# Patient Record
Sex: FEMALE | Race: WHITE | NOT HISPANIC OR LATINO | Employment: FULL TIME | URBAN - METROPOLITAN AREA
[De-identification: names, ages, dates, MRNs, and addresses within clinical notes are randomized per-mention and may not be internally consistent; named-entity substitution may affect disease eponyms.]

---

## 2019-10-23 ENCOUNTER — OFFICE VISIT (OUTPATIENT)
Dept: GYNECOLOGIC ONCOLOGY | Facility: CLINIC | Age: 69
End: 2019-10-23

## 2019-10-23 VITALS
TEMPERATURE: 97.6 F | HEART RATE: 86 BPM | HEIGHT: 64 IN | BODY MASS INDEX: 28.68 KG/M2 | SYSTOLIC BLOOD PRESSURE: 182 MMHG | RESPIRATION RATE: 10 BRPM | OXYGEN SATURATION: 96 % | WEIGHT: 168 LBS | DIASTOLIC BLOOD PRESSURE: 77 MMHG

## 2019-10-23 DIAGNOSIS — R87.619 ATYPICAL GLANDULAR CELLS OF UNDETERMINED SIGNIFICANCE (AGUS) ON CERVICAL PAP SMEAR: ICD-10-CM

## 2019-10-23 DIAGNOSIS — R93.89 THICKENED ENDOMETRIUM: ICD-10-CM

## 2019-10-23 DIAGNOSIS — R01.1 HEART MURMUR: ICD-10-CM

## 2019-10-23 DIAGNOSIS — N95.0 PMB (POSTMENOPAUSAL BLEEDING): Primary | ICD-10-CM

## 2019-10-23 DIAGNOSIS — J42 CHRONIC BRONCHITIS, UNSPECIFIED CHRONIC BRONCHITIS TYPE (HCC): ICD-10-CM

## 2019-10-23 PROBLEM — J44.9 COPD (CHRONIC OBSTRUCTIVE PULMONARY DISEASE) (HCC): Status: ACTIVE | Noted: 2019-10-23

## 2019-10-23 PROCEDURE — 99205 OFFICE O/P NEW HI 60 MIN: CPT | Performed by: OBSTETRICS & GYNECOLOGY

## 2019-10-23 PROCEDURE — 58100 BIOPSY OF UTERUS LINING: CPT | Performed by: OBSTETRICS & GYNECOLOGY

## 2019-10-23 PROCEDURE — 88305 TISSUE EXAM BY PATHOLOGIST: CPT | Performed by: OBSTETRICS & GYNECOLOGY

## 2019-10-23 RX ORDER — GABAPENTIN 300 MG/1
300 CAPSULE ORAL 3 TIMES DAILY
Refills: 0 | COMMUNITY
Start: 2019-09-04 | End: 2021-01-01 | Stop reason: HOSPADM

## 2019-10-23 RX ORDER — IBUPROFEN 200 MG
200 TABLET ORAL 2 TIMES DAILY PRN
COMMUNITY
End: 2021-01-01

## 2019-10-23 RX ORDER — ALBUTEROL SULFATE 90 UG/1
2 AEROSOL, METERED RESPIRATORY (INHALATION) EVERY 4 HOURS PRN
COMMUNITY

## 2019-10-23 RX ORDER — ATORVASTATIN CALCIUM 40 MG/1
40 TABLET, FILM COATED ORAL
Refills: 1 | COMMUNITY
Start: 2019-09-30 | End: 2020-01-01 | Stop reason: ALTCHOICE

## 2019-10-23 RX ORDER — TRIAMTERENE AND HYDROCHLOROTHIAZIDE 37.5; 25 MG/1; MG/1
1 CAPSULE ORAL EVERY MORNING
Refills: 1 | COMMUNITY
Start: 2019-07-27 | End: 2021-01-01 | Stop reason: HOSPADM

## 2019-10-23 RX ORDER — ASPIRIN 81 MG/1
81 TABLET ORAL DAILY
COMMUNITY

## 2019-10-23 RX ORDER — DULOXETIN HYDROCHLORIDE 60 MG/1
CAPSULE, DELAYED RELEASE ORAL
Refills: 1 | COMMUNITY
Start: 2019-10-14

## 2019-10-23 NOTE — PROGRESS NOTES
"Ele Strickland  [id number]  1950      Reason for visit: Menopausal bleeding, Pap smear with atypical glandular cells    Consultation:  Patient is being seen at the request of Kaylen Taylor DO     History of present illness:  The patient is a 69 y.o. year old female who presents today for treatment and evaluation of the above issues.    She reports that she has been having postmenopausal bleeding for about the last 10   Months.  The bleeding is primarily spotting, and mostly at night.  She denies passage of any clots.  She went through menopause in her early 50s, and had not had any vaginal bleeding until 10 months ago.  Having not had a Pap smear in \"forever,\" she presented to her primary gynecologist, who performed a Pap smear, which returned with atypical glandular cells, HPV negative.  An endometrial biopsy was not performed, and she was referred to GYN oncology for further management.    Her medical comorbidities include COPD, diabetes mellitus, anxiety and depression, hypertension, and achalasia.  He sees only her primary provider for these problems.  She has a prominent heart murmur, which she reports has been evaluated in the past and was stated to be \"benign.\"  She has a history of back pain, and has been undergoing steroid injections for the same.  She reports that back surgery has been recommended to her, but she is hesitant to do so, as it would require 6 to 12 months of recovery time.    For new patients, [facility] intake form from 10/23/19 was reviewed and confirmed today.    OBGYN History:  She is a .  She does not currently use HRT, but was briefly on progesterone around the time that she went through menopause. She does not have a history of abnormal pap smears.      Oncologic History:   No history exists.         Past Medical History:   Diagnosis Date   • Chronic back pain    • Chronic bronchitis (CMS/HCC)    • COPD (chronic obstructive pulmonary disease) (CMS/HCC)    • Depression    • Diabetes " (CMS/HCC)    • Gall stones    • Heart murmur    • High blood pressure    • Pinched nerve     back       Past Surgical History:   Procedure Laterality Date   • ESOPHAGUS SURGERY         MEDICATIONS: The current medication list was reviewed with the patient and updated in the EMR this date per the Medical Assistant. Medication dosages and frequencies were confirmed to be accurate.      Allergies:  is allergic to augmentin [amoxicillin-pot clavulanate] and biaxin [clarithromycin].    Social History:   Social History     Socioeconomic History   • Marital status:      Spouse name: Not on file   • Number of children: 2   • Years of education: Not on file   • Highest education level: Not on file   Tobacco Use   • Smoking status: Current Every Day Smoker     Packs/day: 1.00     Years: 30.00     Pack years: 30.00     Types: Cigarettes   Substance and Sexual Activity   • Alcohol use: No     Frequency: Never   • Drug use: No   • Sexual activity: No       Family History:    Family History   Problem Relation Age of Onset   • Heart attack Mother    • Uterine cancer Mother 84   • Lung cancer Brother    • Colon cancer Half-Sister 50   • Colon cancer Paternal Aunt    • Colon cancer Cousin        Health Maintenance:    Health Maintenance   Topic Date Due   • MAMMOGRAM  1950   • ANNUAL PHYSICAL  04/02/1953   • TDAP/TD VACCINES (1 - Tdap) 04/02/1969   • ZOSTER VACCINE (1 of 2) 04/02/2000   • LUNG CANCER SCREENING  04/02/2005   • PNEUMOCOCCAL VACCINES (65+ LOW/MEDIUM RISK) (1 of 2 - PCV13) 04/02/2015   • INFLUENZA VACCINE  08/01/2019   • HEPATITIS C SCREENING  10/23/2019   • COLONOSCOPY  10/23/2019       Review of Systems   Constitutional: Positive for appetite change (poor appetite), fatigue and unexpected weight change (13lb weight loss over 6 months). Negative for chills and fever.   HENT: Positive for mouth sores (thrush), rhinorrhea, sinus pain and sore throat (dry mouth, altered taste). Negative for ear discharge,  "ear pain, hearing loss, nosebleeds, postnasal drip, sinus pressure, tinnitus and trouble swallowing.    Eyes: Positive for itching and visual disturbance (blurred vision). Negative for pain.   Respiratory: Positive for cough and shortness of breath. Negative for wheezing.    Cardiovascular: Negative for chest pain and palpitations.        Heart murmur   Gastrointestinal: Positive for nausea. Negative for abdominal pain, blood in stool, constipation, diarrhea and vomiting.        Heartburn   Endocrine: Negative for heat intolerance (no hot flashes).   Genitourinary: Positive for frequency and urgency. Negative for difficulty urinating, flank pain and hematuria.        No hesitancy, vaginal discharge, pain with intercourse. Reports incontinence of stool and urine, nocturia, and vaginal bleeding.   Musculoskeletal: Positive for arthralgias (back, left hip and leg pain), back pain (pinched nerve), gait problem (limping), joint swelling and myalgias.   Skin: Negative for color change, rash and wound.        Reports skin itching   Allergic/Immunologic: Negative for immunocompromised state.   Neurological: Positive for numbness. Negative for dizziness, seizures, syncope, weakness and headaches.   Hematological: Negative for adenopathy. Bruises/bleeds easily.   Psychiatric/Behavioral: Positive for dysphoric mood. Negative for agitation, confusion and sleep disturbance. The patient is not nervous/anxious.    All other systems reviewed and are negative.      Physical Exam    Vitals:    10/23/19 0929   BP: (!) 182/77   Pulse: 86   Resp: 10   Temp: 97.6 °F (36.4 °C)   TempSrc: Temporal   SpO2: 96%   Weight: 76.2 kg (168 lb)   Height: 162.6 cm (64\")   PainSc:   6   PainLoc: Back     Body mass index is 28.84 kg/m².    Wt Readings from Last 3 Encounters:   10/23/19 76.2 kg (168 lb)         GENERAL: Alert, tired-appearing female appearing her stated age who is in no apparent distress.   HEENT: Sclera anicteric. Head normocephalic, " atraumatic. Mucus membranes moist.   NECK: Trachea midline, supple, without masses.  No thyromegaly.   BREASTS: Deferred  CARDIOVASCULAR: Normal rate, regular rhythm, no murmurs, rubs, or gallops.  No peripheral edema.  RESPIRATORY: Clear to auscultation bilaterally, normal respiratory effort  BACK:  No CVA tenderness, no vertebral tenderness on palpation  GASTROINTESTINAL:  Abdomen is soft, non-tender, non-distended, no rebound or guarding, no masses, or hernias. No HSM.  SKIN:  Warm, dry, well-perfused.  All visible areas intact.  No rashes, lesions, ulcers.  PSYCHIATRIC: AO x3, with appropriate affect, normal thought processes.  NEUROLOGIC: No focal deficits.  Moves extremities well.  MUSCULOSKELETAL: Normal gait and station.   EXTREMITIES:   No cyanosis, clubbing, symmetric.  LYMPHATICS:  No cervical or inguinal adenopathy noted.     PELVIC exam:  External genitalia are free from lesion. On speculum examination, the cervix was free from lesion; a small amount of bleeding was noted from cervical os. On bimanual examination no mass was appreciated.  Uterus was normal in size and shape. There is no cervical motion or uterine tenderness. No cervical mass was palpated. Parametria were smooth. Rectovaginal exam was deferred.     ECOG PS 1    PROCEDURES:  Endometrial biopsy was performed after obtaining informed consent.  Anterior lip of the cervix was grasped with a single-tooth tenaculum.  Pipelle was inserted following dilation with an os finder and a single pass was obtained with moderate tissue.  Upper cervical stenosis was noted at ~5 cm depth.  Patient had cramping but overall tolerated procedure well.  Silver nitrate was applied to the tenaculum site for hemostasis.  Procedure was overall well tolerated.  There was minimal blood loss and no complications at the time of procedure.      Diagnostic Data:     Transvaginal ultrasound ([facility]), performed on 10/11/2010:  Findings:  Uterus: Size is 8.3  x 5.1 x 7.6 cm.  Endometrial thickness is 18 mm.  Anteverted, anteflexed, normal in size.  Multiple uterine fibroids are seen.  The dominant fibroid measures 5.9 x 4.6 x 3.9 cm.  Adnexa: Neither right nor left ovary was visualized.  No adnexal masses seen.  Cul-de-sac: No fluid or mass.  Impression: Multiple uterine fibroids.    No results found for: WBC, HGB, HCT, MCV, PLT, NEUTROABS, GLUCOSE, BUN, CREATININE, EGFRIFNONA, EGFRIFAFRI, NA, K, CL, CO2, MG, PHOS, CALCIUM, ALBUMIN, AST, ALT, BILITOT  No results found for:         Assessment/Plan   This is a 69 y.o. woman with multiple comorbidities who presents today with postmenopausal bleeding, and a Pap smear showing atypical glandular cells.    Post menopausal bleeding, atypical glandular cells  -Endometrial biopsy was performed today, however the exam was somewhat inhibited, possibly by the presence of fibroids.  We will follow-up on the results, but it is likely that the patient will require surgical management.  We discussed the risk of malignancy in this setting, and the possible need for staging should the biopsy returned with high risk cytology.  Patient expressed understanding.  -If biopsy returns with high risk cells, she will require a CT scan prior to surgery.    Tobacco abuse  -She currently smokes about 1 pack/day.  She was encouraged to cut down or quit at least during the alethea-surgical period.    COPD, heart murmur  -She will require assessment by a pulmonologist and cardiologist prior to surgery.  Referrals were made.    Diabetes mellitus, back pain, hypertension  -Continue management per primary provider.    Cardiac murmur, no recent evaluation  -referral to cardiology     Patient was consented for robot-assisted total laparoscopic hysterectomy, bilateral salpingo-oophorectomy, possible lymph node dissection.      Risks and benefits of surgery were discussed.  This included, but was not limited to, infection and bleeding like when the skin is  cut; damage to surrounding structures; and incisional complications.  Risk of DVT was addressed for major surgeries.  Standard of care efforts to minimize these risks were reviewed.  Typical hospital stay and recovery were discussed as well as post-procedure precautions.  Surgical implications of chronic illnesses on recovery and surgical outcome were reviewed.     Pain medication regimen for postoperative care was discussed.  Typical regimen and avoidance of narcotics was discussed.  Patient was educated that other factors, such as existing narcotic use, can impact postoperative pain management.      Risks and benefits of lymph node dissection were further discussed.  This included lymphocyst, hematoma, lymphedema, vascular injury, and nerve injury.    Patient verbalized understanding of the plan including the risks and benefits.  Appropriate perioperative testing including laboratory evaluation, EKG as clinically indicated, chest x-ray as clinically indicated, and preadmission evaluation were all ordered as a part of this patient's care.    Pain assessment was performed today as a part of patient’s care.  For patients with pain related to surgery, gynecologic malignancy or cancer treatment, the plan is as noted in the assessment/plan.  For patients with pain not related to these issues, they are to seek any further needed care from a more appropriate provider, such as PCP.    Orders Placed This Encounter   Procedures   • Ambulatory Referral to Cardiology     Referral Priority:   Routine     Referral Type:   Consultation     Referral Reason:   Specialty Services Required     Requested Specialty:   Cardiology     Number of Visits Requested:   1   • Ambulatory Referral to Pulmonology     Referral Priority:   Routine     Referral Type:   Consultation     Referral Reason:   Specialty Services Required     Requested Specialty:   Pulmonary Disease     Number of Visits Requested:   1       FOLLOW UP: surgery    I  personally spent 60 minutes face-to-face with the patient of which >50% was spent performing counseling/coordiantion of care.      Patient was seen and examined with Dr. Johnson,  resident, who performed portions of the examination and documentation for this patient's care under my direct supervision.  I agree with the above documentation and plan.    Zuly Evans MD  10/23/19  7:50 PM

## 2019-10-25 ENCOUNTER — OFFICE VISIT (OUTPATIENT)
Dept: PULMONOLOGY | Facility: CLINIC | Age: 69
End: 2019-10-25

## 2019-10-25 VITALS
BODY MASS INDEX: 28 KG/M2 | OXYGEN SATURATION: 96 % | HEIGHT: 64 IN | WEIGHT: 164 LBS | HEART RATE: 88 BPM | SYSTOLIC BLOOD PRESSURE: 142 MMHG | DIASTOLIC BLOOD PRESSURE: 86 MMHG | TEMPERATURE: 98.6 F

## 2019-10-25 DIAGNOSIS — R01.1 HEART MURMUR: ICD-10-CM

## 2019-10-25 DIAGNOSIS — I51.7 CARDIOMEGALY: ICD-10-CM

## 2019-10-25 DIAGNOSIS — R05.3 CHRONIC COUGH: Primary | ICD-10-CM

## 2019-10-25 DIAGNOSIS — K21.9 CHRONIC GERD: ICD-10-CM

## 2019-10-25 DIAGNOSIS — R60.0 LOWER EXTREMITY EDEMA: ICD-10-CM

## 2019-10-25 DIAGNOSIS — R06.09 DYSPNEA ON EXERTION: ICD-10-CM

## 2019-10-25 DIAGNOSIS — Z72.0 TOBACCO ABUSE: ICD-10-CM

## 2019-10-25 PROCEDURE — 99204 OFFICE O/P NEW MOD 45 MIN: CPT | Performed by: INTERNAL MEDICINE

## 2019-10-25 PROCEDURE — 94726 PLETHYSMOGRAPHY LUNG VOLUMES: CPT | Performed by: INTERNAL MEDICINE

## 2019-10-25 PROCEDURE — 94729 DIFFUSING CAPACITY: CPT | Performed by: INTERNAL MEDICINE

## 2019-10-25 PROCEDURE — 94375 RESPIRATORY FLOW VOLUME LOOP: CPT | Performed by: INTERNAL MEDICINE

## 2019-10-28 ENCOUNTER — TELEPHONE (OUTPATIENT)
Dept: GYNECOLOGIC ONCOLOGY | Facility: CLINIC | Age: 69
End: 2019-10-28

## 2019-10-28 DIAGNOSIS — R06.09 EXERTIONAL DYSPNEA: Primary | ICD-10-CM

## 2019-10-28 DIAGNOSIS — R06.09 DYSPNEA ON EXERTION: Primary | ICD-10-CM

## 2019-10-28 PROBLEM — Z72.0 TOBACCO ABUSE: Status: ACTIVE | Noted: 2019-10-28

## 2019-10-28 PROBLEM — R60.0 LOWER EXTREMITY EDEMA: Status: ACTIVE | Noted: 2019-10-28

## 2019-10-28 PROBLEM — I51.7 CARDIOMEGALY: Status: ACTIVE | Noted: 2019-10-28

## 2019-10-28 PROBLEM — K21.9 CHRONIC GERD: Status: ACTIVE | Noted: 2019-10-28

## 2019-10-28 NOTE — PROGRESS NOTES
PULMONARY  NOTE    Chief Complaint     Tobacco abuse, chronic cough, dyspnea, reflux    History of Present Illness     69-year-old white female referred for preoperative evaluation.  She is followed by Dr. Crowell.    She has a long history of tobacco abuse that has consisted of 1 or more packs of cigarettes per day.  She continues to smoke.  At this point she does not have any plans for smoking cessation.    She has dyspnea on exertion, not at rest.  She would be able to go up 1 flight of stairs but would get short of breath going up.    She has a chronic cough.  Occurs on a daily basis.  She typically produces a small amount of thick white sputum.  She has not had hemoptysis.    She has regular reflux symptoms.  She is not following reflux precautions.  She occasionally takes Nexium.  She previously had surgery for achalasia.    She has recently been noted to have a murmur and also has chronic lower extremity edema.  She has not had a cardiac work-up yet but is scheduled to be seen by a cardiologist    Patient Active Problem List   Diagnosis   • PMB (postmenopausal bleeding)   • Atypical glandular cells of undetermined significance (RUDDY) on cervical Pap smear   • Thickened endometrium   • Heart murmur   • Dyspnea on exertion   • Cardiomegaly   • Lower extremity edema   • Tobacco abuse   • Chronic GERD     Allergies   Allergen Reactions   • Augmentin [Amoxicillin-Pot Clavulanate] GI Intolerance   • Biaxin [Clarithromycin] Hallucinations       Current Outpatient Medications:   •  albuterol sulfate  (90 Base) MCG/ACT inhaler, Inhale 2 puffs Every 4 (Four) Hours As Needed for Wheezing., Disp: , Rfl:   •  aspirin 81 MG EC tablet, Take 81 mg by mouth Daily., Disp: , Rfl:   •  Aspirin-Acetaminophen-Caffeine (EXCEDRIN PO), Take  by mouth., Disp: , Rfl:   •  atorvastatin (LIPITOR) 40 MG tablet, Take 40 mg by mouth every night at bedtime., Disp: , Rfl: 1  •  beclomethasone (QVAR) 80 MCG/ACT inhaler, Inhale 1 puff  "2 (Two) Times a Day., Disp: , Rfl:   •  DULoxetine (CYMBALTA) 60 MG capsule, TAKE 1 CAPSULE BY MOUTH ONCE DAILY FOR 90 DAYS, Disp: , Rfl: 1  •  gabapentin (NEURONTIN) 300 MG capsule, Take 300 mg by mouth 3 (Three) Times a Day., Disp: , Rfl: 0  •  ibuprofen (ADVIL,MOTRIN) 200 MG tablet, Take 200 mg by mouth 2 (Two) Times a Day As Needed for Mild Pain ., Disp: , Rfl:   •  metFORMIN (GLUCOPHAGE) 1000 MG tablet, Take 1,000 mg by mouth 2 (Two) Times a Day., Disp: , Rfl: 1  •  triamterene-hydrochlorothiazide (DYAZIDE) 37.5-25 MG per capsule, Take 1 capsule by mouth Every Morning., Disp: , Rfl: 1  MEDICATION LIST AND ALLERGIES REVIEWED.    Family History   Problem Relation Age of Onset   • Heart attack Mother    • Uterine cancer Mother 84   • Lung cancer Brother    • Colon cancer Half-Sister 50   • Colon cancer Paternal Aunt    • Colon cancer Cousin      Social History     Tobacco Use   • Smoking status: Current Every Day Smoker     Packs/day: 1.00     Years: 30.00     Pack years: 30.00     Types: Cigarettes   Substance Use Topics   • Alcohol use: No     Frequency: Never   • Drug use: No     Social History     Social History Narrative        Has two children    Works as a  at [employer]    Continues to smoke at least 1 pack a day as she has her adult life    Denies regular alcohol use     FAMILY AND SOCIAL HISTORY REVIEWED.    Review of Systems  ALSO REFER TO SCANNED ROS SHEET FROM SAME DATE.    /86   Pulse 88   Temp 98.6 °F (37 °C)   Ht 162.6 cm (64\")   Wt 74.4 kg (164 lb)   SpO2 96% Comment: room air at rest  BMI 28.15 kg/m²   Physical Exam   Constitutional: She is oriented to person, place, and time. She appears well-developed. No distress.   HENT:   Head: Normocephalic and atraumatic.   Neck: No thyromegaly present.   Cardiovascular: Normal rate and regular rhythm.   Grade 2/6 systolic murmur   Pulmonary/Chest: Effort normal and breath sounds normal. No stridor.   Abdominal: Soft. " Bowel sounds are normal.   Musculoskeletal: Normal range of motion. She exhibits edema.   Pitting edema bilaterally   Lymphadenopathy:     She has no cervical adenopathy.        Right: No supraclavicular and no epitrochlear adenopathy present.        Left: No supraclavicular and no epitrochlear adenopathy present.   Neurological: She is alert and oriented to person, place, and time.   Skin: Skin is warm and dry. She is not diaphoretic.   Psychiatric: She has a normal mood and affect. Her behavior is normal.   Nursing note and vitals reviewed.      Results     Chest X-Ray reveal cardiomegaly but no effusions or consolidation    PFT's reveal no airway obstruction, no restriction, normal correct diffusion.    Problem List       ICD-10-CM ICD-9-CM   1. Chronic cough R05 786.2   2. Dyspnea on exertion R06.09 786.09   3. Cardiomegaly I51.7 429.3   4. Heart murmur R01.1 785.2   5. Lower extremity edema R60.0 782.3   6. Chronic GERD K21.9 530.81   7. Tobacco abuse Z72.0 305.1       Discussion     She has symptoms of chronic bronchitis but preserved lung function.  She might have underlying bronchiectasis; will get a HRCT.  I think she might do better with a LAMA/LABA - I will change her over from the QVAR to Stiolto.  I gave her samples, written instructions, and a prescription.  We discussed potential side effects    Given her smoking history, she would benefit from a screening CT scan of the chest.  We will get both a routine and high-resolution CT scan of the chest.    Reflux may be contributing to her symptoms.  We discussed reflux precautions and I gave her reflux information sheet.    We discussed the need for total smoking abstinence.  Three to 10 minutes was spent discussing the need for total smoking abstinence and smoking cessation strategies.    She needs a iliac evaluation.  She has a prominent murmur, dyspnea on exertion, cardiomegaly on chest x-ray, and lower extremity edema.  She is scheduled to see  cardiology, already.    I will plan to see her back after the above.    Strictly from a pulmonary standpoint there is no contraindication to her undergoing general anesthesia and GYN surgery.  However, I think it is important for her to complete her cardiac work-up preoperatively as well  It would be in her best interest, and reduce postoperative risk, if she were completely abstinent from smoking 2 weeks prior to surgery.  We discussed that today.    David Boyd MD  Note electronically signed    CC: Sharon Acosta, DO

## 2019-10-28 NOTE — PROGRESS NOTES
[facility] - Office Note  Ele Strickland         [address] [facility] [address]  1950   [phone] (home)      LOCATION:  [place] office.  Visit Type: Consult.    PCP:  Sharon Acosta,     10/31/19   Ele Strickland is a 69 y.o.  female  retired.      Chief Complaint: Pre Operative clearance with complaints of MILLER, Edema.  + Murmur.    PROBLEM LIST/PMHx:    1.  Cardiac Murmur   A.  Echo 10/31/19 Dr. Myers:  Left ventricular systolic function is normal. Calculated EF = 58.0%. Moderate aortic valve calcification with moderate aortic stenosis. Peak gradient 59 mmHg, mean gradient 32 mmHg. Dimensionless index 0.4. Calculated aortic valve area 1.13 cm².  Mild to moderate aortic regurgitation with a pressure half-time of 577 ms. Vena contract of 0.4 cm.  Sigmoid-shaped ventricular septum is present.  Left ventricular diastolic (grade I) consistent with impaired relaxation.  Mild tricuspid valve regurgitation is present. Estimated right ventricular systolic pressure from tricuspid regurgitation is mildly elevated (35-45 mmHg).    2.  Exertional Dyspnea, mild  3.  Menopausal bleeding   A.  Pap smear with atypical glandular cells   B.  Biopsy confirms grade 2 endometrioid adenocarcinoma. Surgical intervention recommended, Dr. Zuly Evans.  4.  Occasional Lower Extremity Edema  5.  Ongoing smoking tobacco abuse, with chronic cough  6. Dyslipidemia  7.  Non Insulin Dependent Diabetes Mellitus II  8.  Anxiety/Depression  9.  COPD  10.  Achalasia        Allergies   Allergen Reactions   • Augmentin [Amoxicillin-Pot Clavulanate] GI Intolerance   • Biaxin [Clarithromycin] Hallucinations         Current Outpatient Medications:   •  albuterol sulfate  (90 Base) MCG/ACT inhaler, Inhale 2 puffs Every 4 (Four) Hours As Needed for Wheezing., Disp: , Rfl:   •  aspirin 81 MG EC tablet, Take 81 mg by mouth Daily., Disp: , Rfl:   •  Aspirin-Acetaminophen-Caffeine (EXCEDRIN PO),  Take  by mouth., Disp: , Rfl:   •  atorvastatin (LIPITOR) 40 MG tablet, Take 40 mg by mouth every night at bedtime., Disp: , Rfl: 1  •  beclomethasone (QVAR) 80 MCG/ACT inhaler, Inhale 1 puff 2 (Two) Times a Day., Disp: , Rfl:   •  DULoxetine (CYMBALTA) 60 MG capsule, TAKE 1 CAPSULE BY MOUTH ONCE DAILY FOR 90 DAYS, Disp: , Rfl: 1  •  gabapentin (NEURONTIN) 300 MG capsule, Take 300 mg by mouth 3 (Three) Times a Day., Disp: , Rfl: 0  •  ibuprofen (ADVIL,MOTRIN) 200 MG tablet, Take 200 mg by mouth 2 (Two) Times a Day As Needed for Mild Pain ., Disp: , Rfl:   •  meloxicam (MOBIC) 7.5 MG tablet, Take 1 tablet by mouth As Needed., Disp: , Rfl:   •  metFORMIN (GLUCOPHAGE) 1000 MG tablet, Take 1,000 mg by mouth 2 (Two) Times a Day., Disp: , Rfl: 1  •  triamterene-hydrochlorothiazide (DYAZIDE) 37.5-25 MG per capsule, Take 1 capsule by mouth Every Morning., Disp: , Rfl: 1    HPI  Ele Strickland is a 68 yo CF who has had 10 months of menopausal bleeding.  A recent PAP had abnormal findings and subsequent biopsy indicates Grade 2 endometrioid adenocarcinoma.  It is recommended she undergo robot-assisted total laparoscopic hysterectomy, bilateral salpingo-oophorectomy, possible lymph node dissection, Dr. Zuly Evans.  In pre op work up, she mentions occasional LE edema, some exertional dyspnea, and murmur was found on PE.  She has been referred to cardiology for pre operative consultation.  She has a PMHx of HLP, NIDDMII, and ongoing tobacco abuse.  Prior to her appt today, I ordered an echo to review.    She presents today for clearance and review of her echocardiogram.  Overall, she does fairly well. She has no complaints of chest pain or angina.  Has had no prior cardiac history or work up - but has known about her murmur for at least 10 years. She mentioned exertional dyspnea in PAT.  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  She states her dyspnea has not progressed and it does  "not occur every day.  She acknowledges she still smokes 1 PPD and has early COPD.  As far as her edema, she notices it in the pedal area when she hangs/dangles her legs.  It gets better with standing and ambulation.  Her mom and a brother both have history of MI but not at early ages.          The following portions of the patient's history were reviewed in the chart and updated as appropriate: allergies, current medications, past family history, past medical history, past social history, past surgical history and problem list.    Review of Systems   Constitution: Negative for weakness, malaise/fatigue and weight loss.   Cardiovascular: Positive for dyspnea on exertion and leg swelling. Negative for chest pain, near-syncope, orthopnea, palpitations and syncope.   Respiratory: Negative for cough, hemoptysis, shortness of breath, sputum production and wheezing.    Musculoskeletal: Positive for arthritis and stiffness. Negative for falls.   Genitourinary: Positive for menorrhagia. Negative for hematuria and pelvic pain.   Neurological: Negative for dizziness, focal weakness, headaches and light-headedness.   All other systems reviewed and are negative.            height is 162.6 cm (64\") and weight is 75.3 kg (166 lb). Her blood pressure is 108/60 and her pulse is 85. Her oxygen saturation is 94%.   Physical Exam   Constitutional: Vital signs are normal. She appears well-developed and well-nourished.   HENT:   Head: Normocephalic and atraumatic.   Right Ear: Hearing and external ear normal.   Left Ear: Hearing and external ear normal.   Nose: Nose normal. No septal deviation.   Mouth/Throat: Oropharynx is clear and moist and mucous membranes are normal.   Eyes: Conjunctivae, EOM and lids are normal. Pupils are equal, round, and reactive to light. Right conjunctiva is not injected. Left conjunctiva is not injected.   Neck: Normal range of motion. No JVD present. Carotid bruit is not present. No edema and no erythema " present. No thyroid mass and no thyromegaly present.   Cardiovascular: Normal rate, regular rhythm, S1 normal, S2 normal, intact distal pulses and normal pulses. PMI is not displaced.   Murmur heard.   Medium-pitched harsh midsystolic murmur is present with a grade of 3/6 at the upper right sternal border and upper left sternal border radiating to the neck.  Pulses:       Radial pulses are 2+ on the right side, and 2+ on the left side.        Dorsalis pedis pulses are 2+ on the right side, and 2+ on the left side.        Posterior tibial pulses are 2+ on the right side, and 2+ on the left side.   Pulmonary/Chest: Effort normal and breath sounds normal. No respiratory distress. She has no decreased breath sounds. She has no wheezes. She has no rhonchi. She has no rales.   Abdominal: Soft. Normal appearance, normal aorta and bowel sounds are normal. She exhibits no abdominal bruit and no mass. There is no hepatosplenomegaly. There is no tenderness.   Musculoskeletal: Normal range of motion.   Neurological: She is alert.   Skin: Skin is warm, dry and intact. No cyanosis. Nails show no clubbing.   Psychiatric: She has a normal mood and affect. Her speech is normal.           ECG 12 Lead  Date/Time: 10/31/2019 9:34 AM  Performed by: Angie Goddard PA  Authorized by: Angie Goddard PA   Previous ECG: no previous ECG available  Rhythm: sinus rhythm  Rate: normal  QRS axis: normal    Clinical impression: normal ECG             Assessment/ Plan   Heart murmur: Patient states she has had a murmur for years.  Echocardiogram performed prior to office visit shows normal LV function, grade 1 diastolic dysfunction.  Moderate aortic stenosis is noted along with aortic regurgitation.  She appears to be asymptomatic -no angina, no heart failure or CHF symptoms, no presyncope or syncopal episodes.  Her EKG performed and reviewed is normal sinus rhythm, heart rate 85 bpm.  No prior tracing to compare.  At this time, she is  cleared from a cardiac standpoint to proceed with gynecological surgery and her recent diagnosis of endometrioid adenocarcinoma.  I discussed with her that she will need to be followed for her valvular heart disease and to have an echo repeated at least once a year or more frequent if she becomes symptomatic.  She understood these instructions and I have scheduled her to see me back in 6 months or sooner as needed.  Again, she is cleared to proceed with surgery with Dr. Zuly Crowell.    Dyspnea on exertion:  She admits to some dyspnea at work/with exertion but also admits to still smoking 1 PPD cigarettes.  Her EF is normal and RV pressures are WNL.  She has been cleared by Pulmonology.  At this time, I have advised smoking cessation.  Reviewed echo results with her.  Continue to monitor for now.      Lower extremity edema:  She states the edema is very mild, worse when she sits down and hangs her legs. I did not appreciate any edema on PE today.  Continue to monitor.    Tobacco abuse:  Ele Strickland is a current cigarettes user.  She currently smokes 1 pack of cigarettes per day for a duration of 30 years. I have educated her on the risk of diseases from using tobacco products such as cancer, COPD and heart diease. I advised her to quit and she is willing to quit.   She will follow up with me in 6 months or sooner to check on her progress.  I spent 3  minutes counseling the patient.          Angie Goddard PA-C functioning independently.  10/31/2019 9:20 AM

## 2019-10-29 ENCOUNTER — TELEPHONE (OUTPATIENT)
Dept: GYNECOLOGIC ONCOLOGY | Facility: CLINIC | Age: 69
End: 2019-10-29

## 2019-10-29 LAB
CYTO UR: NORMAL
LAB AP CASE REPORT: NORMAL
LAB AP CLINICAL INFORMATION: NORMAL
LAB AP DIAGNOSIS COMMENT: NORMAL
PATH REPORT.FINAL DX SPEC: NORMAL
PATH REPORT.GROSS SPEC: NORMAL

## 2019-10-29 NOTE — TELEPHONE ENCOUNTER
Pt called my phone for results.  I returned her call and read her the previous message from Dr. Evans's attempt to reach her yesterday.  Pt expressed that she was hoping she didn't have cancer.  She has already been consented for surgical intervention and has surgical clearance scheduled with cardiologist this week.  She has already been cleared by pulmonologist.  Per JAKE Iniguez, surgery scheduler, pt was given tentative date of 11/15/19.  Someone from our office will call pt after cardio clearance received.  She verbalized understanding.

## 2019-10-30 ENCOUNTER — HOSPITAL ENCOUNTER (OUTPATIENT)
Dept: CARDIOLOGY | Facility: HOSPITAL | Age: 69
Discharge: HOME | End: 2019-10-30
Admitting: PHYSICIAN ASSISTANT

## 2019-10-30 ENCOUNTER — TELEPHONE (OUTPATIENT)
Dept: GYNECOLOGIC ONCOLOGY | Facility: CLINIC | Age: 69
End: 2019-10-30

## 2019-10-30 VITALS — HEIGHT: 64 IN | BODY MASS INDEX: 29.02 KG/M2 | WEIGHT: 170 LBS

## 2019-10-30 DIAGNOSIS — R06.09 EXERTIONAL DYSPNEA: ICD-10-CM

## 2019-10-30 DIAGNOSIS — C54.1 ENDOMETRIAL CANCER (HCC): Primary | ICD-10-CM

## 2019-10-30 LAB
BH CV ECHO MEAS - AI DEC SLOPE: 215 CM/SEC^2
BH CV ECHO MEAS - AI MAX PG: 69.5 MMHG
BH CV ECHO MEAS - AI MAX VEL: 416.5 CM/SEC
BH CV ECHO MEAS - AI P1/2T: 567.4 MSEC
BH CV ECHO MEAS - AO MAX PG (FULL): 52.1 MMHG
BH CV ECHO MEAS - AO MAX PG: 59 MMHG
BH CV ECHO MEAS - AO MEAN PG (FULL): 27.8 MMHG
BH CV ECHO MEAS - AO MEAN PG: 31.8 MMHG
BH CV ECHO MEAS - AO ROOT AREA (BSA CORRECTED): 1.5
BH CV ECHO MEAS - AO ROOT AREA: 5.7 CM^2
BH CV ECHO MEAS - AO ROOT DIAM: 2.7 CM
BH CV ECHO MEAS - AO V2 MAX: 384.2 CM/SEC
BH CV ECHO MEAS - AO V2 MEAN: 262 CM/SEC
BH CV ECHO MEAS - AO V2 VTI: 94.4 CM
BH CV ECHO MEAS - AVA(I,A): 1.1 CM^2
BH CV ECHO MEAS - AVA(I,D): 1.1 CM^2
BH CV ECHO MEAS - AVA(V,A): 1.1 CM^2
BH CV ECHO MEAS - AVA(V,D): 1.1 CM^2
BH CV ECHO MEAS - BSA(HAYCOCK): 1.9 M^2
BH CV ECHO MEAS - BSA: 1.8 M^2
BH CV ECHO MEAS - BZI_BMI: 29.2 KILOGRAMS/M^2
BH CV ECHO MEAS - BZI_METRIC_HEIGHT: 162.6 CM
BH CV ECHO MEAS - BZI_METRIC_WEIGHT: 77.1 KG
BH CV ECHO MEAS - EDV(CUBED): 54.9 ML
BH CV ECHO MEAS - EDV(MOD-SP2): 77 ML
BH CV ECHO MEAS - EDV(MOD-SP4): 65 ML
BH CV ECHO MEAS - EDV(TEICH): 62 ML
BH CV ECHO MEAS - EF(CUBED): 74.8 %
BH CV ECHO MEAS - EF(MOD-BP): 58 %
BH CV ECHO MEAS - EF(MOD-SP2): 61 %
BH CV ECHO MEAS - EF(MOD-SP4): 56.9 %
BH CV ECHO MEAS - EF(TEICH): 67.5 %
BH CV ECHO MEAS - ESV(CUBED): 13.8 ML
BH CV ECHO MEAS - ESV(MOD-SP2): 30 ML
BH CV ECHO MEAS - ESV(MOD-SP4): 28 ML
BH CV ECHO MEAS - ESV(TEICH): 20.2 ML
BH CV ECHO MEAS - FS: 36.8 %
BH CV ECHO MEAS - IVS/LVPW: 1.2
BH CV ECHO MEAS - IVSD: 1.2 CM
BH CV ECHO MEAS - LA DIMENSION: 3.9 CM
BH CV ECHO MEAS - LA/AO: 1.4
BH CV ECHO MEAS - LAD MAJOR: 6.2 CM
BH CV ECHO MEAS - LAT PEAK E' VEL: 8.2 CM/SEC
BH CV ECHO MEAS - LATERAL E/E' RATIO: 9.5
BH CV ECHO MEAS - LV DIASTOLIC VOL/BSA (35-75): 35.6 ML/M^2
BH CV ECHO MEAS - LV MASS(C)D: 130.2 GRAMS
BH CV ECHO MEAS - LV MASS(C)DI: 71.3 GRAMS/M^2
BH CV ECHO MEAS - LV MAX PG: 6.9 MMHG
BH CV ECHO MEAS - LV MEAN PG: 4 MMHG
BH CV ECHO MEAS - LV SYSTOLIC VOL/BSA (12-30): 15.3 ML/M^2
BH CV ECHO MEAS - LV V1 MAX: 131.5 CM/SEC
BH CV ECHO MEAS - LV V1 MEAN: 93.5 CM/SEC
BH CV ECHO MEAS - LV V1 VTI: 33.6 CM
BH CV ECHO MEAS - LVIDD: 3.8 CM
BH CV ECHO MEAS - LVIDS: 2.4 CM
BH CV ECHO MEAS - LVLD AP2: 8.2 CM
BH CV ECHO MEAS - LVLD AP4: 8.3 CM
BH CV ECHO MEAS - LVLS AP2: 7.7 CM
BH CV ECHO MEAS - LVLS AP4: 7 CM
BH CV ECHO MEAS - LVOT AREA (M): 3.1 CM^2
BH CV ECHO MEAS - LVOT AREA: 3.1 CM^2
BH CV ECHO MEAS - LVOT DIAM: 2 CM
BH CV ECHO MEAS - LVPWD: 1 CM
BH CV ECHO MEAS - MED PEAK E' VEL: 7.1 CM/SEC
BH CV ECHO MEAS - MEDIAL E/E' RATIO: 10.9
BH CV ECHO MEAS - MV A MAX VEL: 109 CM/SEC
BH CV ECHO MEAS - MV DEC SLOPE: 506 CM/SEC^2
BH CV ECHO MEAS - MV DEC TIME: 0.21 SEC
BH CV ECHO MEAS - MV E MAX VEL: 78 CM/SEC
BH CV ECHO MEAS - MV E/A: 0.72
BH CV ECHO MEAS - MV P1/2T MAX VEL: 132 CM/SEC
BH CV ECHO MEAS - MV P1/2T: 76.4 MSEC
BH CV ECHO MEAS - MVA P1/2T LCG: 1.7 CM^2
BH CV ECHO MEAS - MVA(P1/2T): 2.9 CM^2
BH CV ECHO MEAS - PA ACC SLOPE: 617 CM/SEC^2
BH CV ECHO MEAS - PA ACC TIME: 0.15 SEC
BH CV ECHO MEAS - PA PR(ACCEL): 12.4 MMHG
BH CV ECHO MEAS - RAP SYSTOLE: 5 MMHG
BH CV ECHO MEAS - RVSP: 38 MMHG
BH CV ECHO MEAS - SI(AO): 296.1 ML/M^2
BH CV ECHO MEAS - SI(CUBED): 22.5 ML/M^2
BH CV ECHO MEAS - SI(LVOT): 57.8 ML/M^2
BH CV ECHO MEAS - SI(MOD-SP2): 25.7 ML/M^2
BH CV ECHO MEAS - SI(MOD-SP4): 20.3 ML/M^2
BH CV ECHO MEAS - SI(TEICH): 22.9 ML/M^2
BH CV ECHO MEAS - SV(AO): 540.5 ML
BH CV ECHO MEAS - SV(CUBED): 41 ML
BH CV ECHO MEAS - SV(LVOT): 105.6 ML
BH CV ECHO MEAS - SV(MOD-SP2): 47 ML
BH CV ECHO MEAS - SV(MOD-SP4): 37 ML
BH CV ECHO MEAS - SV(TEICH): 41.8 ML
BH CV ECHO MEAS - TAPSE (>1.6): 2.6 CM2
BH CV ECHO MEAS - TR MAX PG: 33 MMHG
BH CV ECHO MEAS - TR MAX VEL: 285.5 CM/SEC
BH CV ECHO MEASUREMENTS AVERAGE E/E' RATIO: 10.2
BH CV VAS BP RIGHT ARM: NORMAL MMHG
BH CV XLRA - RV BASE: 3.5 CM
BH CV XLRA - RV LENGTH: 6.4 CM
BH CV XLRA - RV MID: 2.6 CM
BH CV XLRA - TDI S': 11.7 CM/SEC
LEFT ATRIUM VOLUME INDEX: 38.9 ML/M^2
LEFT ATRIUM VOLUME: 71 ML
MAXIMAL PREDICTED HEART RATE: 151 BPM
STRESS TARGET HR: 128 BPM

## 2019-10-30 PROCEDURE — 93306 TTE W/DOPPLER COMPLETE: CPT | Performed by: INTERNAL MEDICINE

## 2019-10-30 PROCEDURE — 93306 TTE W/DOPPLER COMPLETE: CPT

## 2019-10-30 NOTE — TELEPHONE ENCOUNTER
I called pt again to discuss pathology.  CT scan ordered - this to be done pre op given 10 months of bleeding prior to diagnosis..  I got VM again and left message again.  Noted that pulmonary had ordered CT scan of chest and advised that I have ordered CT scan of abdomen and pelvis.  Advised that I will be back in the office 10/31/2019, but will be back in surgery 11/1/2019 and that surgery days are difficult for me to be available for phone calls.

## 2019-10-31 ENCOUNTER — CONSULT (OUTPATIENT)
Dept: CARDIOLOGY | Facility: CLINIC | Age: 69
End: 2019-10-31

## 2019-10-31 ENCOUNTER — TELEPHONE (OUTPATIENT)
Dept: GYNECOLOGIC ONCOLOGY | Facility: CLINIC | Age: 69
End: 2019-10-31

## 2019-10-31 VITALS
HEIGHT: 64 IN | SYSTOLIC BLOOD PRESSURE: 108 MMHG | WEIGHT: 166 LBS | OXYGEN SATURATION: 94 % | HEART RATE: 85 BPM | DIASTOLIC BLOOD PRESSURE: 60 MMHG | BODY MASS INDEX: 28.34 KG/M2

## 2019-10-31 DIAGNOSIS — Z72.0 TOBACCO ABUSE: ICD-10-CM

## 2019-10-31 DIAGNOSIS — R01.1 HEART MURMUR: ICD-10-CM

## 2019-10-31 DIAGNOSIS — R60.0 LOWER EXTREMITY EDEMA: ICD-10-CM

## 2019-10-31 DIAGNOSIS — R06.09 DYSPNEA ON EXERTION: Primary | ICD-10-CM

## 2019-10-31 PROCEDURE — 99244 OFF/OP CNSLTJ NEW/EST MOD 40: CPT | Performed by: PHYSICIAN ASSISTANT

## 2019-10-31 PROCEDURE — 99406 BEHAV CHNG SMOKING 3-10 MIN: CPT | Performed by: PHYSICIAN ASSISTANT

## 2019-10-31 PROCEDURE — 93000 ELECTROCARDIOGRAM COMPLETE: CPT | Performed by: PHYSICIAN ASSISTANT

## 2019-10-31 RX ORDER — MELOXICAM 7.5 MG/1
1 TABLET ORAL AS NEEDED
COMMUNITY
End: 2021-01-01

## 2019-10-31 NOTE — TELEPHONE ENCOUNTER
I tried to call patient again to discuss pathology and surgery.  Left voice message.  Advised that may be she should come for a face-to-face visit since were having problems connecting over the telephone.

## 2019-11-07 ENCOUNTER — OFFICE VISIT (OUTPATIENT)
Dept: GYNECOLOGIC ONCOLOGY | Facility: CLINIC | Age: 69
End: 2019-11-07

## 2019-11-07 VITALS
BODY MASS INDEX: 28.15 KG/M2 | RESPIRATION RATE: 10 BRPM | SYSTOLIC BLOOD PRESSURE: 139 MMHG | HEART RATE: 89 BPM | TEMPERATURE: 97.9 F | OXYGEN SATURATION: 94 % | DIASTOLIC BLOOD PRESSURE: 66 MMHG | WEIGHT: 164 LBS

## 2019-11-07 DIAGNOSIS — C54.1 ENDOMETRIAL CANCER (HCC): Primary | ICD-10-CM

## 2019-11-07 PROBLEM — R01.1 SYSTOLIC MURMUR: Status: ACTIVE | Noted: 2019-07-05

## 2019-11-07 PROBLEM — G62.9 NEUROPATHY: Status: ACTIVE | Noted: 2019-07-05

## 2019-11-07 PROBLEM — I10 ESSENTIAL HYPERTENSION: Status: ACTIVE | Noted: 2019-07-05

## 2019-11-07 PROBLEM — F17.200 NICOTINE DEPENDENCE: Status: ACTIVE | Noted: 2019-07-05

## 2019-11-07 PROBLEM — E11.9 TYPE 2 DIABETES MELLITUS WITHOUT COMPLICATION (HCC): Status: ACTIVE | Noted: 2019-07-05

## 2019-11-07 PROBLEM — K22.0 ACHALASIA OF ESOPHAGUS: Status: ACTIVE | Noted: 2019-07-05

## 2019-11-07 PROBLEM — J44.9 CHRONIC OBSTRUCTIVE LUNG DISEASE (HCC): Status: ACTIVE | Noted: 2019-07-05

## 2019-11-07 PROBLEM — Z01.818 PRE-OP EXAMINATION: Status: ACTIVE | Noted: 2019-11-07

## 2019-11-07 PROCEDURE — 99024 POSTOP FOLLOW-UP VISIT: CPT | Performed by: OBSTETRICS & GYNECOLOGY

## 2019-11-07 NOTE — PROGRESS NOTES
Ele Strickland  [id number]  1950      Reason for visit: Intermediate grade endometrioid adenocarcinoma, discussion of pathology, preoperative evaluation    Consultation:  Patient is being seen at the request of Kaylen Taylor DO     History of present illness:  The patient is a 69 y.o. year old female who presents today for treatment and evaluation of the above issues.    She reports doing well since last visit.  No changes to her health.  She reports decreased appetite and continued pain from a pinched nerve in her leg.  Otherwise is doing well.  No nausea, vomiting, chest pain, shortness of breath, issues with bowel or bladder.  She has been evaluated by pulmonology and cardiology.  CT scan of chest ordered by pulmonology is pending scheduling, otherwise she is cleared for surgery.  She is anxious about her cancer diagnosis and concerned that she did not present for medical care earlier.    OBGYN History:  She is a .  She does not currently use HRT, but was briefly on progesterone around the time that she went through menopause. She does not have a history of abnormal pap smears.      Oncologic History:   No history exists.         Past Medical History:   Diagnosis Date   • Chronic back pain    • Chronic bronchitis (CMS/HCC)    • Depression    • Diabetes (CMS/HCC)    • Gall stones    • Heart murmur    • High blood pressure    • Pinched nerve     back       Past Surgical History:   Procedure Laterality Date   • ESOPHAGUS SURGERY         MEDICATIONS: The current medication list was reviewed with the patient and updated in the EMR this date per the Medical Assistant. Medication dosages and frequencies were confirmed to be accurate.      Allergies:  is allergic to augmentin [amoxicillin-pot clavulanate] and biaxin [clarithromycin].    Social History:   Social History     Socioeconomic History   • Marital status:      Spouse name: Not on file   • Number of children: 2   • Years of education: Not on file    • Highest education level: Not on file   Tobacco Use   • Smoking status: Current Every Day Smoker     Packs/day: 1.00     Years: 30.00     Pack years: 30.00     Types: Cigarettes   • Smokeless tobacco: Never Used   Substance and Sexual Activity   • Alcohol use: No     Frequency: Never   • Drug use: No   • Sexual activity: No   Social History Narrative        Has two children    Works as a  at [employer]    Continues to smoke at least 1 pack a day as she has her adult life    Denies regular alcohol use       Family History:    Family History   Problem Relation Age of Onset   • Heart attack Mother    • Uterine cancer Mother 84   • Lung cancer Brother    • Colon cancer Half-Sister 50   • Colon cancer Paternal Aunt    • Colon cancer Cousin    • Heart attack Brother        Health Maintenance:    Health Maintenance   Topic Date Due   • MAMMOGRAM  1950   • URINE MICROALBUMIN  1950   • ANNUAL PHYSICAL  04/02/1953   • TDAP/TD VACCINES (1 - Tdap) 04/02/1969   • ZOSTER VACCINE (1 of 2) 04/02/2000   • LUNG CANCER SCREENING  04/02/2005   • PNEUMOCOCCAL VACCINES (65+ LOW/MEDIUM RISK) (1 of 2 - PCV13) 04/02/2015   • INFLUENZA VACCINE  08/01/2019   • HEPATITIS C SCREENING  10/23/2019   • DIABETIC FOOT EXAM  10/23/2019   • HEMOGLOBIN A1C  10/23/2019   • DIABETIC EYE EXAM  10/23/2019   • COLONOSCOPY  10/23/2019       Review of Systems   Constitutional: Positive for appetite change (poor appetite), fatigue and unexpected weight change (13lb weight loss over 6 months). Negative for chills and fever.   HENT: Positive for mouth sores (thrush), rhinorrhea, sinus pain and sore throat (dry mouth, altered taste). Negative for ear discharge, ear pain, hearing loss, nosebleeds, postnasal drip, sinus pressure, tinnitus and trouble swallowing.    Eyes: Positive for itching and visual disturbance (blurred vision). Negative for pain.   Respiratory: Positive for cough and shortness of breath. Negative for  wheezing.    Cardiovascular: Negative for chest pain and palpitations.        Heart murmur   Gastrointestinal: Positive for nausea. Negative for abdominal pain, blood in stool, constipation, diarrhea and vomiting.        Heartburn   Endocrine: Negative for heat intolerance (no hot flashes).   Genitourinary: Positive for frequency and urgency. Negative for difficulty urinating, flank pain and hematuria.        No hesitancy, vaginal discharge, pain with intercourse. Reports incontinence of stool and urine, nocturia, and vaginal bleeding.   Musculoskeletal: Positive for arthralgias (back, left hip and leg pain), back pain (pinched nerve), gait problem (limping), joint swelling and myalgias.   Skin: Negative for color change, rash and wound.        Reports skin itching   Allergic/Immunologic: Negative for immunocompromised state.   Neurological: Positive for numbness. Negative for dizziness, seizures, syncope, weakness and headaches.   Hematological: Negative for adenopathy. Bruises/bleeds easily.   Psychiatric/Behavioral: Positive for dysphoric mood. Negative for agitation, confusion and sleep disturbance. The patient is not nervous/anxious.    All other systems reviewed and are negative.      Physical Exam    Vitals:    11/07/19 0957   BP: 139/66   Pulse: 89   Resp: 10   Temp: 97.9 °F (36.6 °C)   TempSrc: Temporal   SpO2: 94%   Weight: 74.4 kg (164 lb)   PainSc:   7     Body mass index is 28.15 kg/m².    Wt Readings from Last 3 Encounters:   11/07/19 74.4 kg (164 lb)   10/31/19 75.3 kg (166 lb)   10/30/19 77.1 kg (170 lb)         GENERAL: Alert, tired-appearing female appearing her stated age who is in no apparent distress.   HEENT: Sclera anicteric. Head normocephalic, atraumatic. Mucus membranes moist.   NECK: Trachea midline, supple, without masses.  No thyromegaly.   BREASTS: Deferred  CARDIOVASCULAR: Normal rate, regular rhythm, no murmurs, rubs, or gallops.  No peripheral edema.  RESPIRATORY: Clear to  auscultation bilaterally, normal respiratory effort  BACK:  No CVA tenderness, no vertebral tenderness on palpation  GASTROINTESTINAL:  Abdomen is soft, non-tender, non-distended, no rebound or guarding, no masses, or hernias. No HSM.  SKIN:  Warm, dry, well-perfused.  All visible areas intact.  No rashes, lesions, ulcers.  PSYCHIATRIC: AO x3, with appropriate affect, normal thought processes.  NEUROLOGIC: No focal deficits.  Moves extremities well.  MUSCULOSKELETAL: Normal gait and station.   EXTREMITIES:   No cyanosis, clubbing, symmetric.  LYMPHATICS:  No cervical or inguinal adenopathy noted.     PELVIC exam: Deferred.    ECOG PS 1    PROCEDURES: None      Diagnostic Data:     Transvaginal ultrasound ([facility]), performed on 10/11/2010:  Findings:  Uterus: Size is 8.3 x 5.1 x 7.6 cm.  Endometrial thickness is 18 mm.  Anteverted, anteflexed, normal in size.  Multiple uterine fibroids are seen.  The dominant fibroid measures 5.9 x 4.6 x 3.9 cm.  Adnexa: Neither right nor left ovary was visualized.  No adnexal masses seen.  Cul-de-sac: No fluid or mass.  Impression: Multiple uterine fibroids.    No results found for: WBC, HGB, HCT, MCV, PLT, NEUTROABS, GLUCOSE, BUN, CREATININE, EGFRIFNONA, EGFRIFAFRI, NA, K, CL, CO2, MG, PHOS, CALCIUM, ALBUMIN, AST, ALT, BILITOT  No results found for:         Assessment/Plan   This is a 69 y.o. woman with multiple comorbidities with intermediate grade endometrioid adenocarcinoma on endometrial biopsy.    Post menopausal bleeding, atypical glandular cells  -Previously was consented for robot-assisted total laparoscopic hysterectomy, bilateral salpingo-oophorectomy, possible lymph node dissection  -Again discussed surgery  -Plan for preop CT to assess for metastatic disease    Tobacco abuse  -She currently smokes about 1 pack/day.  She was encouraged to cut down or quit at least during the alethea-surgical period.    COPD  -Saw Dr. Boyd with pulmonology, CT  ordered  -Cleared for surgery    Diabetes mellitus, back pain, hypertension  -Continue management per primary provider.    Cardiac murmur, no recent evaluation  -Was seen by cardiology  -Echo showed moderate aortic stenosis  -Cleared for surgery    Pain assessment was performed today as a part of patient’s care.  For patients with pain related to surgery, gynecologic malignancy or cancer treatment, the plan is as noted in the assessment/plan.  For patients with pain not related to these issues, they are to seek any further needed care from a more appropriate provider, such as PCP.    No orders of the defined types were placed in this encounter.    FOLLOW UP: surgery    I saw and evaluated the patient. I agree with the findings and the plan of care as documented in the note.    Zuly Evans MD  11/07/19  12:54 PM

## 2019-11-07 NOTE — H&P (VIEW-ONLY)
Ele Strickland  [id number]  1950      Reason for visit: Intermediate grade endometrioid adenocarcinoma, discussion of pathology, preoperative evaluation    Consultation:  Patient is being seen at the request of Kaylen Taylor DO     History of present illness:  The patient is a 69 y.o. year old female who presents today for treatment and evaluation of the above issues.    She reports doing well since last visit.  No changes to her health.  She reports decreased appetite and continued pain from a pinched nerve in her leg.  Otherwise is doing well.  No nausea, vomiting, chest pain, shortness of breath, issues with bowel or bladder.  She has been evaluated by pulmonology and cardiology.  CT scan of chest ordered by pulmonology is pending scheduling, otherwise she is cleared for surgery.  She is anxious about her cancer diagnosis and concerned that she did not present for medical care earlier.    OBGYN History:  She is a .  She does not currently use HRT, but was briefly on progesterone around the time that she went through menopause. She does not have a history of abnormal pap smears.      Oncologic History:   No history exists.         Past Medical History:   Diagnosis Date   • Chronic back pain    • Chronic bronchitis (CMS/HCC)    • Depression    • Diabetes (CMS/HCC)    • Gall stones    • Heart murmur    • High blood pressure    • Pinched nerve     back       Past Surgical History:   Procedure Laterality Date   • ESOPHAGUS SURGERY         MEDICATIONS: The current medication list was reviewed with the patient and updated in the EMR this date per the Medical Assistant. Medication dosages and frequencies were confirmed to be accurate.      Allergies:  is allergic to augmentin [amoxicillin-pot clavulanate] and biaxin [clarithromycin].    Social History:   Social History     Socioeconomic History   • Marital status:      Spouse name: Not on file   • Number of children: 2   • Years of education: Not on file    • Highest education level: Not on file   Tobacco Use   • Smoking status: Current Every Day Smoker     Packs/day: 1.00     Years: 30.00     Pack years: 30.00     Types: Cigarettes   • Smokeless tobacco: Never Used   Substance and Sexual Activity   • Alcohol use: No     Frequency: Never   • Drug use: No   • Sexual activity: No   Social History Narrative        Has two children    Works as a  at [employer]    Continues to smoke at least 1 pack a day as she has her adult life    Denies regular alcohol use       Family History:    Family History   Problem Relation Age of Onset   • Heart attack Mother    • Uterine cancer Mother 84   • Lung cancer Brother    • Colon cancer Half-Sister 50   • Colon cancer Paternal Aunt    • Colon cancer Cousin    • Heart attack Brother        Health Maintenance:    Health Maintenance   Topic Date Due   • MAMMOGRAM  1950   • URINE MICROALBUMIN  1950   • ANNUAL PHYSICAL  04/02/1953   • TDAP/TD VACCINES (1 - Tdap) 04/02/1969   • ZOSTER VACCINE (1 of 2) 04/02/2000   • LUNG CANCER SCREENING  04/02/2005   • PNEUMOCOCCAL VACCINES (65+ LOW/MEDIUM RISK) (1 of 2 - PCV13) 04/02/2015   • INFLUENZA VACCINE  08/01/2019   • HEPATITIS C SCREENING  10/23/2019   • DIABETIC FOOT EXAM  10/23/2019   • HEMOGLOBIN A1C  10/23/2019   • DIABETIC EYE EXAM  10/23/2019   • COLONOSCOPY  10/23/2019       Review of Systems   Constitutional: Positive for appetite change (poor appetite), fatigue and unexpected weight change (13lb weight loss over 6 months). Negative for chills and fever.   HENT: Positive for mouth sores (thrush), rhinorrhea, sinus pain and sore throat (dry mouth, altered taste). Negative for ear discharge, ear pain, hearing loss, nosebleeds, postnasal drip, sinus pressure, tinnitus and trouble swallowing.    Eyes: Positive for itching and visual disturbance (blurred vision). Negative for pain.   Respiratory: Positive for cough and shortness of breath. Negative for  wheezing.    Cardiovascular: Negative for chest pain and palpitations.        Heart murmur   Gastrointestinal: Positive for nausea. Negative for abdominal pain, blood in stool, constipation, diarrhea and vomiting.        Heartburn   Endocrine: Negative for heat intolerance (no hot flashes).   Genitourinary: Positive for frequency and urgency. Negative for difficulty urinating, flank pain and hematuria.        No hesitancy, vaginal discharge, pain with intercourse. Reports incontinence of stool and urine, nocturia, and vaginal bleeding.   Musculoskeletal: Positive for arthralgias (back, left hip and leg pain), back pain (pinched nerve), gait problem (limping), joint swelling and myalgias.   Skin: Negative for color change, rash and wound.        Reports skin itching   Allergic/Immunologic: Negative for immunocompromised state.   Neurological: Positive for numbness. Negative for dizziness, seizures, syncope, weakness and headaches.   Hematological: Negative for adenopathy. Bruises/bleeds easily.   Psychiatric/Behavioral: Positive for dysphoric mood. Negative for agitation, confusion and sleep disturbance. The patient is not nervous/anxious.    All other systems reviewed and are negative.      Physical Exam    Vitals:    11/07/19 0957   BP: 139/66   Pulse: 89   Resp: 10   Temp: 97.9 °F (36.6 °C)   TempSrc: Temporal   SpO2: 94%   Weight: 74.4 kg (164 lb)   PainSc:   7     Body mass index is 28.15 kg/m².    Wt Readings from Last 3 Encounters:   11/07/19 74.4 kg (164 lb)   10/31/19 75.3 kg (166 lb)   10/30/19 77.1 kg (170 lb)         GENERAL: Alert, tired-appearing female appearing her stated age who is in no apparent distress.   HEENT: Sclera anicteric. Head normocephalic, atraumatic. Mucus membranes moist.   NECK: Trachea midline, supple, without masses.  No thyromegaly.   BREASTS: Deferred  CARDIOVASCULAR: Normal rate, regular rhythm, no murmurs, rubs, or gallops.  No peripheral edema.  RESPIRATORY: Clear to  auscultation bilaterally, normal respiratory effort  BACK:  No CVA tenderness, no vertebral tenderness on palpation  GASTROINTESTINAL:  Abdomen is soft, non-tender, non-distended, no rebound or guarding, no masses, or hernias. No HSM.  SKIN:  Warm, dry, well-perfused.  All visible areas intact.  No rashes, lesions, ulcers.  PSYCHIATRIC: AO x3, with appropriate affect, normal thought processes.  NEUROLOGIC: No focal deficits.  Moves extremities well.  MUSCULOSKELETAL: Normal gait and station.   EXTREMITIES:   No cyanosis, clubbing, symmetric.  LYMPHATICS:  No cervical or inguinal adenopathy noted.     PELVIC exam: Deferred.    ECOG PS 1    PROCEDURES: None      Diagnostic Data:     Transvaginal ultrasound ([facility]), performed on 10/11/2010:  Findings:  Uterus: Size is 8.3 x 5.1 x 7.6 cm.  Endometrial thickness is 18 mm.  Anteverted, anteflexed, normal in size.  Multiple uterine fibroids are seen.  The dominant fibroid measures 5.9 x 4.6 x 3.9 cm.  Adnexa: Neither right nor left ovary was visualized.  No adnexal masses seen.  Cul-de-sac: No fluid or mass.  Impression: Multiple uterine fibroids.    No results found for: WBC, HGB, HCT, MCV, PLT, NEUTROABS, GLUCOSE, BUN, CREATININE, EGFRIFNONA, EGFRIFAFRI, NA, K, CL, CO2, MG, PHOS, CALCIUM, ALBUMIN, AST, ALT, BILITOT  No results found for:         Assessment/Plan   This is a 69 y.o. woman with multiple comorbidities with intermediate grade endometrioid adenocarcinoma on endometrial biopsy.    Post menopausal bleeding, atypical glandular cells  -Previously was consented for robot-assisted total laparoscopic hysterectomy, bilateral salpingo-oophorectomy, possible lymph node dissection  -Again discussed surgery  -Plan for preop CT to assess for metastatic disease    Tobacco abuse  -She currently smokes about 1 pack/day.  She was encouraged to cut down or quit at least during the alethea-surgical period.    COPD  -Saw Dr. Boyd with pulmonology, CT  ordered  -Cleared for surgery    Diabetes mellitus, back pain, hypertension  -Continue management per primary provider.    Cardiac murmur, no recent evaluation  -Was seen by cardiology  -Echo showed moderate aortic stenosis  -Cleared for surgery    Pain assessment was performed today as a part of patient’s care.  For patients with pain related to surgery, gynecologic malignancy or cancer treatment, the plan is as noted in the assessment/plan.  For patients with pain not related to these issues, they are to seek any further needed care from a more appropriate provider, such as PCP.    No orders of the defined types were placed in this encounter.    FOLLOW UP: surgery    I saw and evaluated the patient. I agree with the findings and the plan of care as documented in the note.    Zuly Evans MD  11/07/19  12:54 PM

## 2019-11-08 ENCOUNTER — PATIENT EDUCATION (SURGERY INSTRUCTIONS) (OUTPATIENT)
Dept: GYNECOLOGIC ONCOLOGY | Facility: CLINIC | Age: 69
End: 2019-11-08

## 2019-11-08 ENCOUNTER — PREP FOR SURGERY (OUTPATIENT)
Dept: GYNECOLOGIC ONCOLOGY | Facility: CLINIC | Age: 69
End: 2019-11-08

## 2019-11-08 DIAGNOSIS — C54.1 ENDOMETRIAL CANCER (HCC): ICD-10-CM

## 2019-11-08 DIAGNOSIS — N95.0 PMB (POSTMENOPAUSAL BLEEDING): ICD-10-CM

## 2019-11-08 RX ORDER — PREGABALIN 25 MG/1
150 CAPSULE ORAL ONCE
Status: CANCELLED | OUTPATIENT
Start: 2019-11-08

## 2019-11-08 RX ORDER — SODIUM CHLORIDE, SODIUM LACTATE, POTASSIUM CHLORIDE, CALCIUM CHLORIDE 600; 310; 30; 20 MG/100ML; MG/100ML; MG/100ML; MG/100ML
100 INJECTION, SOLUTION INTRAVENOUS CONTINUOUS
Status: CANCELLED | OUTPATIENT
Start: 2019-11-08

## 2019-11-08 RX ORDER — CELECOXIB 100 MG/1
200 CAPSULE ORAL ONCE
Status: CANCELLED | OUTPATIENT
Start: 2019-11-08

## 2019-11-08 RX ORDER — ACETAMINOPHEN 325 MG/1
650 TABLET ORAL ONCE
Status: CANCELLED | OUTPATIENT
Start: 2019-11-08

## 2019-11-08 NOTE — PATIENT INSTRUCTIONS
Gynecologic Oncology  Inpatient Pre-op Patient Education  *See checked boxes for your instructions*    Patient Name:  Ele Strickland  [id number]  1950    Surgeon:  Dr. Evans    Appointment  [x]  1. Your surgery has been scheduled on 11-15-19. You will need to be at the second floor surgery registration of the main hospital on that day at 6:00am.   [x] 2.  You have a pre-admission testing (PAT) appointment for labs and possibly chest xray and EKG, on 11-14-19 at 11:00am, you do not need to be fasting for this appointment. You will need to be at hospital registration on the first floor, 10 minutes before that time.    [x] 3.  The hospital registration department is located in the long hallway between the [facility] and [address] buildings.     The Day(s) Before Surgery  [x] 1. On 11-14-19, the day prior to surgery, eat a regular diet.  Nothing to eat or drink after midnight on 11-14-19.     [x] 2.  Please remove fingernail polish, you may leave toenail polish on.   [x] 3.  Do not take vitamins or full dose aspirin one week before surgery.  If you normally take a blood thinning medication such as Warfarin, Eliquis, or Xarelto, we will give you specific instructions regarding these medications and we may need to talk with your other doctors.   [x] 4.  On the morning of your surgery, you may likely take your routine prescription medications with a sip of water as reviewed with you by your surgeon.  Bring your home medications with you to the hospital as we may need to reference these.  In particular be sure to bring any inhalers.     Post-surgery Instructions  [x] 1.  The length of stay for your type of surgery is typically one hospital night, however it is also possible that you could be discharged home in the evening on the same day depending on the nature of your surgery.  All rooms are private, so family member may stay with you.     [x] 2.  Do not take your own home prescription medication while you are in the hospital  unless otherwise instructed.  These will be provided to you.         Comments:

## 2019-11-11 ENCOUNTER — HOSPITAL ENCOUNTER (OUTPATIENT)
Dept: CT IMAGING | Facility: HOSPITAL | Age: 69
Discharge: HOME | End: 2019-11-11
Admitting: OBSTETRICS & GYNECOLOGY

## 2019-11-11 DIAGNOSIS — C54.1 ENDOMETRIAL CANCER (HCC): ICD-10-CM

## 2019-11-11 LAB — CREAT BLDA-MCNC: 0.6 MG/DL (ref 0.6–1.3)

## 2019-11-11 PROCEDURE — 74177 CT ABD & PELVIS W/CONTRAST: CPT

## 2019-11-11 PROCEDURE — 25010000002 IOPAMIDOL 61 % SOLUTION: Performed by: OBSTETRICS & GYNECOLOGY

## 2019-11-11 PROCEDURE — 82565 ASSAY OF CREATININE: CPT

## 2019-11-11 RX ADMIN — IOPAMIDOL 100 ML: 612 INJECTION, SOLUTION INTRAVENOUS at 16:21

## 2019-11-11 RX ADMIN — BARIUM SULFATE 450 ML: 21 SUSPENSION ORAL at 16:21

## 2019-11-14 ENCOUNTER — APPOINTMENT (OUTPATIENT)
Dept: PREADMISSION TESTING | Facility: HOSPITAL | Age: 69
End: 2019-11-14

## 2019-11-14 ENCOUNTER — ANESTHESIA EVENT (OUTPATIENT)
Dept: PERIOP | Facility: HOSPITAL | Age: 69
End: 2019-11-14

## 2019-11-14 VITALS — WEIGHT: 162.8 LBS | HEIGHT: 64 IN | BODY MASS INDEX: 27.79 KG/M2

## 2019-11-14 DIAGNOSIS — C54.1 ENDOMETRIAL CANCER (HCC): ICD-10-CM

## 2019-11-14 LAB
ABO GROUP BLD: NORMAL
ALBUMIN SERPL-MCNC: 4.3 G/DL (ref 3.5–5.2)
ALBUMIN/GLOB SERPL: 1.7 G/DL
ALP SERPL-CCNC: 95 U/L (ref 39–117)
ALT SERPL W P-5'-P-CCNC: 11 U/L (ref 1–33)
ANION GAP SERPL CALCULATED.3IONS-SCNC: 13 MMOL/L (ref 5–15)
AST SERPL-CCNC: 15 U/L (ref 1–32)
BASOPHILS # BLD AUTO: 0.02 10*3/MM3 (ref 0–0.2)
BASOPHILS NFR BLD AUTO: 0.3 % (ref 0–1.5)
BILIRUB SERPL-MCNC: 0.3 MG/DL (ref 0.2–1.2)
BLD GP AB SCN SERPL QL: NEGATIVE
BUN BLD-MCNC: 17 MG/DL (ref 8–23)
BUN/CREAT SERPL: 29.3 (ref 7–25)
CALCIUM SPEC-SCNC: 10 MG/DL (ref 8.6–10.5)
CHLORIDE SERPL-SCNC: 99 MMOL/L (ref 98–107)
CO2 SERPL-SCNC: 31 MMOL/L (ref 22–29)
CREAT BLD-MCNC: 0.58 MG/DL (ref 0.57–1)
DEPRECATED RDW RBC AUTO: 42.8 FL (ref 37–54)
EOSINOPHIL # BLD AUTO: 0.05 10*3/MM3 (ref 0–0.4)
EOSINOPHIL NFR BLD AUTO: 0.7 % (ref 0.3–6.2)
ERYTHROCYTE [DISTWIDTH] IN BLOOD BY AUTOMATED COUNT: 14.2 % (ref 12.3–15.4)
GFR SERPL CREATININE-BSD FRML MDRD: 103 ML/MIN/1.73
GLOBULIN UR ELPH-MCNC: 2.5 GM/DL
GLUCOSE BLD-MCNC: 139 MG/DL (ref 65–99)
HBA1C MFR BLD: 6.5 % (ref 4.8–5.6)
HCT VFR BLD AUTO: 38.7 % (ref 34–46.6)
HGB BLD-MCNC: 11.5 G/DL (ref 12–15.9)
IMM GRANULOCYTES # BLD AUTO: 0.01 10*3/MM3 (ref 0–0.05)
IMM GRANULOCYTES NFR BLD AUTO: 0.1 % (ref 0–0.5)
LYMPHOCYTES # BLD AUTO: 2.06 10*3/MM3 (ref 0.7–3.1)
LYMPHOCYTES NFR BLD AUTO: 27.7 % (ref 19.6–45.3)
MCH RBC QN AUTO: 24.8 PG (ref 26.6–33)
MCHC RBC AUTO-ENTMCNC: 29.7 G/DL (ref 31.5–35.7)
MCV RBC AUTO: 83.4 FL (ref 79–97)
MONOCYTES # BLD AUTO: 0.53 10*3/MM3 (ref 0.1–0.9)
MONOCYTES NFR BLD AUTO: 7.1 % (ref 5–12)
NEUTROPHILS # BLD AUTO: 4.77 10*3/MM3 (ref 1.7–7)
NEUTROPHILS NFR BLD AUTO: 64.1 % (ref 42.7–76)
NRBC BLD AUTO-RTO: 0 /100 WBC (ref 0–0.2)
PLATELET # BLD AUTO: 299 10*3/MM3 (ref 140–450)
PMV BLD AUTO: 8.6 FL (ref 6–12)
POTASSIUM BLD-SCNC: 3.8 MMOL/L (ref 3.5–5.2)
PROT SERPL-MCNC: 6.8 G/DL (ref 6–8.5)
RBC # BLD AUTO: 4.64 10*6/MM3 (ref 3.77–5.28)
RH BLD: NEGATIVE
SODIUM BLD-SCNC: 143 MMOL/L (ref 136–145)
T&S EXPIRATION DATE: NORMAL
WBC NRBC COR # BLD: 7.44 10*3/MM3 (ref 3.4–10.8)

## 2019-11-14 PROCEDURE — 80053 COMPREHEN METABOLIC PANEL: CPT | Performed by: OBSTETRICS & GYNECOLOGY

## 2019-11-14 PROCEDURE — 83036 HEMOGLOBIN GLYCOSYLATED A1C: CPT | Performed by: ANESTHESIOLOGY

## 2019-11-14 PROCEDURE — 85025 COMPLETE CBC W/AUTO DIFF WBC: CPT | Performed by: OBSTETRICS & GYNECOLOGY

## 2019-11-14 PROCEDURE — 36415 COLL VENOUS BLD VENIPUNCTURE: CPT

## 2019-11-14 PROCEDURE — 86901 BLOOD TYPING SEROLOGIC RH(D): CPT | Performed by: OBSTETRICS & GYNECOLOGY

## 2019-11-14 PROCEDURE — 86900 BLOOD TYPING SEROLOGIC ABO: CPT | Performed by: OBSTETRICS & GYNECOLOGY

## 2019-11-14 PROCEDURE — 86850 RBC ANTIBODY SCREEN: CPT | Performed by: OBSTETRICS & GYNECOLOGY

## 2019-11-14 RX ORDER — SODIUM CHLORIDE 0.9 % (FLUSH) 0.9 %
3 SYRINGE (ML) INJECTION EVERY 12 HOURS SCHEDULED
Status: CANCELLED | OUTPATIENT
Start: 2019-11-14

## 2019-11-14 RX ORDER — SODIUM CHLORIDE 0.9 % (FLUSH) 0.9 %
3-10 SYRINGE (ML) INJECTION AS NEEDED
Status: CANCELLED | OUTPATIENT
Start: 2019-11-14

## 2019-11-14 NOTE — PAT
Cardiac and pulmonary clearance on chart.     Patient to apply Chlorhexadine wipes  to surgical area (as instructed) the night before procedure and the AM of procedure. Wipes provided.

## 2019-11-15 ENCOUNTER — ANESTHESIA (OUTPATIENT)
Dept: PERIOP | Facility: HOSPITAL | Age: 69
End: 2019-11-15

## 2019-11-15 ENCOUNTER — HOSPITAL ENCOUNTER (INPATIENT)
Facility: HOSPITAL | Age: 69
LOS: 1 days | Discharge: HOME | End: 2019-11-16
Attending: OBSTETRICS & GYNECOLOGY | Admitting: OBSTETRICS & GYNECOLOGY

## 2019-11-15 ENCOUNTER — TELEPHONE (OUTPATIENT)
Dept: GYNECOLOGIC ONCOLOGY | Facility: CLINIC | Age: 69
End: 2019-11-15

## 2019-11-15 DIAGNOSIS — C54.1 ENDOMETRIAL CANCER (HCC): ICD-10-CM

## 2019-11-15 PROBLEM — F41.9 ANXIETY AND DEPRESSION: Chronic | Status: ACTIVE | Noted: 2019-11-15

## 2019-11-15 PROBLEM — F32.A ANXIETY AND DEPRESSION: Chronic | Status: ACTIVE | Noted: 2019-11-15

## 2019-11-15 LAB
ABO GROUP BLD: NORMAL
GLUCOSE BLDC GLUCOMTR-MCNC: 131 MG/DL (ref 70–130)
GLUCOSE BLDC GLUCOMTR-MCNC: 173 MG/DL (ref 70–130)
GLUCOSE BLDC GLUCOMTR-MCNC: 192 MG/DL (ref 70–130)
GLUCOSE BLDC GLUCOMTR-MCNC: 197 MG/DL (ref 70–130)
GLUCOSE BLDC GLUCOMTR-MCNC: 241 MG/DL (ref 70–130)
RH BLD: NEGATIVE

## 2019-11-15 PROCEDURE — 99222 1ST HOSP IP/OBS MODERATE 55: CPT | Performed by: NURSE PRACTITIONER

## 2019-11-15 PROCEDURE — 58548 LAP RADICAL HYST: CPT | Performed by: OBSTETRICS & GYNECOLOGY

## 2019-11-15 PROCEDURE — 88331 PATH CONSLTJ SURG 1 BLK 1SPC: CPT | Performed by: PATHOLOGY

## 2019-11-15 PROCEDURE — 82962 GLUCOSE BLOOD TEST: CPT

## 2019-11-15 PROCEDURE — 25010000002 DEXAMETHASONE PER 1 MG: Performed by: NURSE ANESTHETIST, CERTIFIED REGISTERED

## 2019-11-15 PROCEDURE — 07BC0ZZ EXCISION OF PELVIS LYMPHATIC, OPEN APPROACH: ICD-10-PCS | Performed by: OBSTETRICS & GYNECOLOGY

## 2019-11-15 PROCEDURE — 88342 IMHCHEM/IMCYTCHM 1ST ANTB: CPT

## 2019-11-15 PROCEDURE — 0UT24ZZ RESECTION OF BILATERAL OVARIES, PERCUTANEOUS ENDOSCOPIC APPROACH: ICD-10-PCS | Performed by: OBSTETRICS & GYNECOLOGY

## 2019-11-15 PROCEDURE — 25010000002 FENTANYL CITRATE (PF) 100 MCG/2ML SOLUTION: Performed by: NURSE ANESTHETIST, CERTIFIED REGISTERED

## 2019-11-15 PROCEDURE — 88381 MICRODISSECTION MANUAL: CPT

## 2019-11-15 PROCEDURE — 86900 BLOOD TYPING SEROLOGIC ABO: CPT

## 2019-11-15 PROCEDURE — 94799 UNLISTED PULMONARY SVC/PX: CPT

## 2019-11-15 PROCEDURE — 86901 BLOOD TYPING SEROLOGIC RH(D): CPT

## 2019-11-15 PROCEDURE — 88309 TISSUE EXAM BY PATHOLOGIST: CPT | Performed by: OBSTETRICS & GYNECOLOGY

## 2019-11-15 PROCEDURE — 25010000002 PHENYLEPHRINE PER 1 ML: Performed by: NURSE ANESTHETIST, CERTIFIED REGISTERED

## 2019-11-15 PROCEDURE — 0UT74ZZ RESECTION OF BILATERAL FALLOPIAN TUBES, PERCUTANEOUS ENDOSCOPIC APPROACH: ICD-10-PCS | Performed by: OBSTETRICS & GYNECOLOGY

## 2019-11-15 PROCEDURE — 88305 TISSUE EXAM BY PATHOLOGIST: CPT | Performed by: OBSTETRICS & GYNECOLOGY

## 2019-11-15 PROCEDURE — 88341 IMHCHEM/IMCYTCHM EA ADD ANTB: CPT

## 2019-11-15 PROCEDURE — 0UT94ZZ RESECTION OF UTERUS, PERCUTANEOUS ENDOSCOPIC APPROACH: ICD-10-PCS | Performed by: OBSTETRICS & GYNECOLOGY

## 2019-11-15 PROCEDURE — 63710000001 INSULIN LISPRO (HUMAN) PER 5 UNITS: Performed by: OBSTETRICS & GYNECOLOGY

## 2019-11-15 PROCEDURE — 94640 AIRWAY INHALATION TREATMENT: CPT

## 2019-11-15 PROCEDURE — 88307 TISSUE EXAM BY PATHOLOGIST: CPT | Performed by: OBSTETRICS & GYNECOLOGY

## 2019-11-15 PROCEDURE — 25010000002 ONDANSETRON PER 1 MG: Performed by: NURSE ANESTHETIST, CERTIFIED REGISTERED

## 2019-11-15 PROCEDURE — 8E0W4CZ ROBOTIC ASSISTED PROCEDURE OF TRUNK REGION, PERCUTANEOUS ENDOSCOPIC APPROACH: ICD-10-PCS | Performed by: OBSTETRICS & GYNECOLOGY

## 2019-11-15 PROCEDURE — 25010000003 CEFAZOLIN IN DEXTROSE 2-4 GM/100ML-% SOLUTION: Performed by: OBSTETRICS & GYNECOLOGY

## 2019-11-15 PROCEDURE — 25010000002 PROPOFOL 10 MG/ML EMULSION: Performed by: NURSE ANESTHETIST, CERTIFIED REGISTERED

## 2019-11-15 PROCEDURE — 25010000002 NEOSTIGMINE 10 MG/10ML SOLUTION: Performed by: NURSE ANESTHETIST, CERTIFIED REGISTERED

## 2019-11-15 PROCEDURE — 63710000001 INSULIN LISPRO (HUMAN) PER 5 UNITS

## 2019-11-15 PROCEDURE — 81288 MLH1 GENE: CPT

## 2019-11-15 RX ORDER — IPRATROPIUM BROMIDE AND ALBUTEROL SULFATE 2.5; .5 MG/3ML; MG/3ML
3 SOLUTION RESPIRATORY (INHALATION) ONCE AS NEEDED
Status: DISCONTINUED | OUTPATIENT
Start: 2019-11-15 | End: 2019-11-15 | Stop reason: HOSPADM

## 2019-11-15 RX ORDER — LIDOCAINE HYDROCHLORIDE 10 MG/ML
0.5 INJECTION, SOLUTION EPIDURAL; INFILTRATION; INTRACAUDAL; PERINEURAL ONCE AS NEEDED
Status: COMPLETED | OUTPATIENT
Start: 2019-11-15 | End: 2019-11-15

## 2019-11-15 RX ORDER — NEOSTIGMINE METHYLSULFATE 1 MG/ML
INJECTION, SOLUTION INTRAVENOUS AS NEEDED
Status: DISCONTINUED | OUTPATIENT
Start: 2019-11-15 | End: 2019-11-15 | Stop reason: SURG

## 2019-11-15 RX ORDER — PROMETHAZINE HYDROCHLORIDE 25 MG/1
25 SUPPOSITORY RECTAL ONCE AS NEEDED
Status: DISCONTINUED | OUTPATIENT
Start: 2019-11-15 | End: 2019-11-15 | Stop reason: HOSPADM

## 2019-11-15 RX ORDER — PROPOFOL 10 MG/ML
VIAL (ML) INTRAVENOUS AS NEEDED
Status: DISCONTINUED | OUTPATIENT
Start: 2019-11-15 | End: 2019-11-15 | Stop reason: SURG

## 2019-11-15 RX ORDER — NALOXONE HCL 0.4 MG/ML
0.4 VIAL (ML) INJECTION
Status: DISCONTINUED | OUTPATIENT
Start: 2019-11-15 | End: 2019-11-16 | Stop reason: HOSPADM

## 2019-11-15 RX ORDER — ONDANSETRON 4 MG/1
4 TABLET, FILM COATED ORAL EVERY 6 HOURS PRN
Status: DISCONTINUED | OUTPATIENT
Start: 2019-11-15 | End: 2019-11-15

## 2019-11-15 RX ORDER — IBUPROFEN 600 MG/1
600 TABLET ORAL EVERY 6 HOURS PRN
Status: DISCONTINUED | OUTPATIENT
Start: 2019-11-15 | End: 2019-11-16 | Stop reason: HOSPADM

## 2019-11-15 RX ORDER — NALOXONE HCL 0.4 MG/ML
0.1 VIAL (ML) INJECTION
Status: DISCONTINUED | OUTPATIENT
Start: 2019-11-15 | End: 2019-11-16 | Stop reason: HOSPADM

## 2019-11-15 RX ORDER — PHENYLEPHRINE HCL IN 0.9% NACL 0.5 MG/5ML
.5-3 SYRINGE (ML) INTRAVENOUS
Status: DISCONTINUED | OUTPATIENT
Start: 2019-11-15 | End: 2019-11-15 | Stop reason: HOSPADM

## 2019-11-15 RX ORDER — ACETAMINOPHEN 325 MG/1
650 TABLET ORAL ONCE
Status: COMPLETED | OUTPATIENT
Start: 2019-11-15 | End: 2019-11-15

## 2019-11-15 RX ORDER — HYDROMORPHONE HYDROCHLORIDE 1 MG/ML
0.5 INJECTION, SOLUTION INTRAMUSCULAR; INTRAVENOUS; SUBCUTANEOUS
Status: DISCONTINUED | OUTPATIENT
Start: 2019-11-15 | End: 2019-11-16 | Stop reason: HOSPADM

## 2019-11-15 RX ORDER — LIDOCAINE HYDROCHLORIDE 10 MG/ML
INJECTION, SOLUTION EPIDURAL; INFILTRATION; INTRACAUDAL; PERINEURAL AS NEEDED
Status: DISCONTINUED | OUTPATIENT
Start: 2019-11-15 | End: 2019-11-15 | Stop reason: SURG

## 2019-11-15 RX ORDER — PROMETHAZINE HYDROCHLORIDE 25 MG/ML
6.25 INJECTION, SOLUTION INTRAMUSCULAR; INTRAVENOUS ONCE AS NEEDED
Status: DISCONTINUED | OUTPATIENT
Start: 2019-11-15 | End: 2019-11-15 | Stop reason: HOSPADM

## 2019-11-15 RX ORDER — OXYCODONE HYDROCHLORIDE 5 MG/1
5 TABLET ORAL EVERY 4 HOURS PRN
Status: DISCONTINUED | OUTPATIENT
Start: 2019-11-15 | End: 2019-11-15

## 2019-11-15 RX ORDER — CEFAZOLIN SODIUM 2 G/100ML
2 INJECTION, SOLUTION INTRAVENOUS ONCE
Status: COMPLETED | OUTPATIENT
Start: 2019-11-15 | End: 2019-11-15

## 2019-11-15 RX ORDER — MAGNESIUM HYDROXIDE 1200 MG/15ML
LIQUID ORAL AS NEEDED
Status: DISCONTINUED | OUTPATIENT
Start: 2019-11-15 | End: 2019-11-15 | Stop reason: HOSPADM

## 2019-11-15 RX ORDER — OXYCODONE HYDROCHLORIDE 5 MG/1
5 TABLET ORAL EVERY 4 HOURS PRN
Status: DISCONTINUED | OUTPATIENT
Start: 2019-11-15 | End: 2019-11-16 | Stop reason: HOSPADM

## 2019-11-15 RX ORDER — PROMETHAZINE HYDROCHLORIDE 12.5 MG/1
12.5 TABLET ORAL EVERY 6 HOURS PRN
Status: DISCONTINUED | OUTPATIENT
Start: 2019-11-15 | End: 2019-11-16 | Stop reason: HOSPADM

## 2019-11-15 RX ORDER — SODIUM CHLORIDE, SODIUM LACTATE, POTASSIUM CHLORIDE, CALCIUM CHLORIDE 600; 310; 30; 20 MG/100ML; MG/100ML; MG/100ML; MG/100ML
75 INJECTION, SOLUTION INTRAVENOUS CONTINUOUS
Status: DISCONTINUED | OUTPATIENT
Start: 2019-11-15 | End: 2019-11-16

## 2019-11-15 RX ORDER — DOCUSATE SODIUM 100 MG/1
200 CAPSULE, LIQUID FILLED ORAL 2 TIMES DAILY
Status: DISCONTINUED | OUTPATIENT
Start: 2019-11-15 | End: 2019-11-15

## 2019-11-15 RX ORDER — ALBUTEROL SULFATE 2.5 MG/3ML
2.5 SOLUTION RESPIRATORY (INHALATION)
Status: DISCONTINUED | OUTPATIENT
Start: 2019-11-15 | End: 2019-11-16 | Stop reason: HOSPADM

## 2019-11-15 RX ORDER — PREGABALIN 150 MG/1
150 CAPSULE ORAL ONCE
Status: COMPLETED | OUTPATIENT
Start: 2019-11-15 | End: 2019-11-15

## 2019-11-15 RX ORDER — ESMOLOL HYDROCHLORIDE 10 MG/ML
INJECTION INTRAVENOUS AS NEEDED
Status: DISCONTINUED | OUTPATIENT
Start: 2019-11-15 | End: 2019-11-15 | Stop reason: SURG

## 2019-11-15 RX ORDER — FENTANYL CITRATE 50 UG/ML
INJECTION, SOLUTION INTRAMUSCULAR; INTRAVENOUS AS NEEDED
Status: DISCONTINUED | OUTPATIENT
Start: 2019-11-15 | End: 2019-11-15 | Stop reason: SURG

## 2019-11-15 RX ORDER — DEXTROSE MONOHYDRATE 25 G/50ML
25 INJECTION, SOLUTION INTRAVENOUS
Status: DISCONTINUED | OUTPATIENT
Start: 2019-11-15 | End: 2019-11-16 | Stop reason: HOSPADM

## 2019-11-15 RX ORDER — PROMETHAZINE HYDROCHLORIDE 25 MG/ML
12.5 INJECTION, SOLUTION INTRAMUSCULAR; INTRAVENOUS EVERY 6 HOURS PRN
Status: DISCONTINUED | OUTPATIENT
Start: 2019-11-15 | End: 2019-11-16 | Stop reason: HOSPADM

## 2019-11-15 RX ORDER — DEXAMETHASONE SODIUM PHOSPHATE 4 MG/ML
INJECTION, SOLUTION INTRA-ARTICULAR; INTRALESIONAL; INTRAMUSCULAR; INTRAVENOUS; SOFT TISSUE AS NEEDED
Status: DISCONTINUED | OUTPATIENT
Start: 2019-11-15 | End: 2019-11-15 | Stop reason: SURG

## 2019-11-15 RX ORDER — ONDANSETRON 2 MG/ML
4 INJECTION INTRAMUSCULAR; INTRAVENOUS EVERY 6 HOURS PRN
Status: DISCONTINUED | OUTPATIENT
Start: 2019-11-15 | End: 2019-11-16 | Stop reason: HOSPADM

## 2019-11-15 RX ORDER — SODIUM CHLORIDE 9 MG/ML
INJECTION, SOLUTION INTRAVENOUS AS NEEDED
Status: DISCONTINUED | OUTPATIENT
Start: 2019-11-15 | End: 2019-11-15 | Stop reason: HOSPADM

## 2019-11-15 RX ORDER — SODIUM CHLORIDE, SODIUM LACTATE, POTASSIUM CHLORIDE, CALCIUM CHLORIDE 600; 310; 30; 20 MG/100ML; MG/100ML; MG/100ML; MG/100ML
9 INJECTION, SOLUTION INTRAVENOUS CONTINUOUS PRN
Status: DISCONTINUED | OUTPATIENT
Start: 2019-11-15 | End: 2019-11-15 | Stop reason: HOSPADM

## 2019-11-15 RX ORDER — BUPIVACAINE HYDROCHLORIDE AND EPINEPHRINE 5; 5 MG/ML; UG/ML
INJECTION, SOLUTION PERINEURAL AS NEEDED
Status: DISCONTINUED | OUTPATIENT
Start: 2019-11-15 | End: 2019-11-15 | Stop reason: HOSPADM

## 2019-11-15 RX ORDER — PROMETHAZINE HYDROCHLORIDE 12.5 MG/1
12.5 SUPPOSITORY RECTAL EVERY 6 HOURS PRN
Status: DISCONTINUED | OUTPATIENT
Start: 2019-11-15 | End: 2019-11-16 | Stop reason: HOSPADM

## 2019-11-15 RX ORDER — ACETAMINOPHEN 325 MG/1
650 TABLET ORAL EVERY 6 HOURS SCHEDULED
Status: DISCONTINUED | OUTPATIENT
Start: 2019-11-15 | End: 2019-11-16 | Stop reason: HOSPADM

## 2019-11-15 RX ORDER — ONDANSETRON 2 MG/ML
INJECTION INTRAMUSCULAR; INTRAVENOUS AS NEEDED
Status: DISCONTINUED | OUTPATIENT
Start: 2019-11-15 | End: 2019-11-15 | Stop reason: SURG

## 2019-11-15 RX ORDER — ONDANSETRON 4 MG/1
4 TABLET, FILM COATED ORAL EVERY 6 HOURS PRN
Status: DISCONTINUED | OUTPATIENT
Start: 2019-11-15 | End: 2019-11-16 | Stop reason: HOSPADM

## 2019-11-15 RX ORDER — HYDROMORPHONE HYDROCHLORIDE 1 MG/ML
0.5 INJECTION, SOLUTION INTRAMUSCULAR; INTRAVENOUS; SUBCUTANEOUS
Status: DISCONTINUED | OUTPATIENT
Start: 2019-11-15 | End: 2019-11-15 | Stop reason: HOSPADM

## 2019-11-15 RX ORDER — PROMETHAZINE HYDROCHLORIDE 25 MG/1
25 TABLET ORAL ONCE AS NEEDED
Status: DISCONTINUED | OUTPATIENT
Start: 2019-11-15 | End: 2019-11-15 | Stop reason: HOSPADM

## 2019-11-15 RX ORDER — GLYCOPYRROLATE 0.2 MG/ML
INJECTION INTRAMUSCULAR; INTRAVENOUS AS NEEDED
Status: DISCONTINUED | OUTPATIENT
Start: 2019-11-15 | End: 2019-11-15 | Stop reason: SURG

## 2019-11-15 RX ORDER — OXYCODONE HYDROCHLORIDE 5 MG/1
10 TABLET ORAL EVERY 4 HOURS PRN
Status: DISCONTINUED | OUTPATIENT
Start: 2019-11-15 | End: 2019-11-16 | Stop reason: HOSPADM

## 2019-11-15 RX ORDER — ONDANSETRON 2 MG/ML
4 INJECTION INTRAMUSCULAR; INTRAVENOUS ONCE AS NEEDED
Status: DISCONTINUED | OUTPATIENT
Start: 2019-11-15 | End: 2019-11-15 | Stop reason: HOSPADM

## 2019-11-15 RX ORDER — FENTANYL CITRATE 50 UG/ML
50 INJECTION, SOLUTION INTRAMUSCULAR; INTRAVENOUS
Status: DISCONTINUED | OUTPATIENT
Start: 2019-11-15 | End: 2019-11-15 | Stop reason: HOSPADM

## 2019-11-15 RX ORDER — NICOTINE POLACRILEX 4 MG
15 LOZENGE BUCCAL
Status: DISCONTINUED | OUTPATIENT
Start: 2019-11-15 | End: 2019-11-16 | Stop reason: HOSPADM

## 2019-11-15 RX ORDER — ROCURONIUM BROMIDE 10 MG/ML
INJECTION, SOLUTION INTRAVENOUS AS NEEDED
Status: DISCONTINUED | OUTPATIENT
Start: 2019-11-15 | End: 2019-11-15 | Stop reason: SURG

## 2019-11-15 RX ORDER — TEMAZEPAM 7.5 MG/1
7.5 CAPSULE ORAL NIGHTLY PRN
Status: DISCONTINUED | OUTPATIENT
Start: 2019-11-15 | End: 2019-11-16 | Stop reason: HOSPADM

## 2019-11-15 RX ORDER — CELECOXIB 200 MG/1
200 CAPSULE ORAL ONCE
Status: COMPLETED | OUTPATIENT
Start: 2019-11-15 | End: 2019-11-15

## 2019-11-15 RX ORDER — MEPERIDINE HYDROCHLORIDE 25 MG/ML
12.5 INJECTION INTRAMUSCULAR; INTRAVENOUS; SUBCUTANEOUS
Status: DISCONTINUED | OUTPATIENT
Start: 2019-11-15 | End: 2019-11-15 | Stop reason: HOSPADM

## 2019-11-15 RX ORDER — ACETAMINOPHEN 325 MG/1
650 TABLET ORAL EVERY 6 HOURS PRN
Status: DISCONTINUED | OUTPATIENT
Start: 2019-11-15 | End: 2019-11-15

## 2019-11-15 RX ORDER — IBUPROFEN 600 MG/1
600 TABLET ORAL EVERY 6 HOURS PRN
Status: DISCONTINUED | OUTPATIENT
Start: 2019-11-15 | End: 2019-11-15

## 2019-11-15 RX ORDER — PROPOFOL 10 MG/ML
VIAL (ML) INTRAVENOUS CONTINUOUS PRN
Status: DISCONTINUED | OUTPATIENT
Start: 2019-11-15 | End: 2019-11-15 | Stop reason: SURG

## 2019-11-15 RX ORDER — FAMOTIDINE 20 MG/1
20 TABLET, FILM COATED ORAL
Status: DISCONTINUED | OUTPATIENT
Start: 2019-11-15 | End: 2019-11-15 | Stop reason: HOSPADM

## 2019-11-15 RX ADMIN — ONDANSETRON 4 MG: 2 INJECTION INTRAMUSCULAR; INTRAVENOUS at 11:04

## 2019-11-15 RX ADMIN — SODIUM CHLORIDE, POTASSIUM CHLORIDE, SODIUM LACTATE AND CALCIUM CHLORIDE: 600; 310; 30; 20 INJECTION, SOLUTION INTRAVENOUS at 09:42

## 2019-11-15 RX ADMIN — FENTANYL CITRATE 50 MCG: 50 INJECTION, SOLUTION INTRAMUSCULAR; INTRAVENOUS at 08:00

## 2019-11-15 RX ADMIN — ALBUTEROL SULFATE 2.5 MG: 2.5 SOLUTION RESPIRATORY (INHALATION) at 19:06

## 2019-11-15 RX ADMIN — ACETAMINOPHEN 650 MG: 325 TABLET ORAL at 07:25

## 2019-11-15 RX ADMIN — ESMOLOL HYDROCHLORIDE 1 MG: 10 INJECTION INTRAVENOUS at 09:11

## 2019-11-15 RX ADMIN — FENTANYL CITRATE 50 MCG: 50 INJECTION, SOLUTION INTRAMUSCULAR; INTRAVENOUS at 09:47

## 2019-11-15 RX ADMIN — FAMOTIDINE 20 MG: 20 TABLET ORAL at 07:26

## 2019-11-15 RX ADMIN — ACETAMINOPHEN 650 MG: 325 TABLET ORAL at 17:27

## 2019-11-15 RX ADMIN — GLYCOPYRROLATE 0.4 MG: 0.2 INJECTION, SOLUTION INTRAMUSCULAR; INTRAVENOUS at 11:13

## 2019-11-15 RX ADMIN — NEOSTIGMINE METHYLSULFATE 2 MG: 1 INJECTION, SOLUTION INTRAVENOUS at 11:13

## 2019-11-15 RX ADMIN — PREGABALIN 150 MG: 150 CAPSULE ORAL at 07:26

## 2019-11-15 RX ADMIN — ESMOLOL HYDROCHLORIDE 1 MG: 10 INJECTION INTRAVENOUS at 08:54

## 2019-11-15 RX ADMIN — PROPOFOL 25 MCG/KG/MIN: 10 INJECTION, EMULSION INTRAVENOUS at 08:38

## 2019-11-15 RX ADMIN — ESMOLOL HYDROCHLORIDE 1 MG: 10 INJECTION INTRAVENOUS at 09:27

## 2019-11-15 RX ADMIN — ROCURONIUM BROMIDE 20 MG: 10 INJECTION INTRAVENOUS at 08:56

## 2019-11-15 RX ADMIN — ROCURONIUM BROMIDE 30 MG: 10 INJECTION INTRAVENOUS at 08:00

## 2019-11-15 RX ADMIN — ALBUTEROL SULFATE 2.5 MG: 2.5 SOLUTION RESPIRATORY (INHALATION) at 23:56

## 2019-11-15 RX ADMIN — FENTANYL CITRATE 50 MCG: 0.05 INJECTION, SOLUTION INTRAMUSCULAR; INTRAVENOUS at 12:12

## 2019-11-15 RX ADMIN — PROPOFOL 120 MG: 10 INJECTION, EMULSION INTRAVENOUS at 08:00

## 2019-11-15 RX ADMIN — FENTANYL CITRATE 50 MCG: 50 INJECTION, SOLUTION INTRAMUSCULAR; INTRAVENOUS at 09:09

## 2019-11-15 RX ADMIN — LIDOCAINE HYDROCHLORIDE 50 MG: 10 INJECTION, SOLUTION EPIDURAL; INFILTRATION; INTRACAUDAL; PERINEURAL at 08:00

## 2019-11-15 RX ADMIN — PHENYLEPHRINE HYDROCHLORIDE 0.2 MCG: 10 INJECTION INTRAVENOUS at 08:39

## 2019-11-15 RX ADMIN — ACETAMINOPHEN 650 MG: 325 TABLET ORAL at 13:44

## 2019-11-15 RX ADMIN — PHENYLEPHRINE HYDROCHLORIDE 100 MCG: 10 INJECTION INTRAVENOUS at 08:32

## 2019-11-15 RX ADMIN — OXYCODONE HYDROCHLORIDE 5 MG: 5 TABLET ORAL at 15:01

## 2019-11-15 RX ADMIN — INSULIN LISPRO 4 UNITS: 100 INJECTION, SOLUTION INTRAVENOUS; SUBCUTANEOUS at 17:27

## 2019-11-15 RX ADMIN — CEFAZOLIN SODIUM 2 G: 2 INJECTION, SOLUTION INTRAVENOUS at 07:57

## 2019-11-15 RX ADMIN — CELECOXIB 200 MG: 200 CAPSULE ORAL at 07:25

## 2019-11-15 RX ADMIN — DEXAMETHASONE SODIUM PHOSPHATE 8 MG: 4 INJECTION, SOLUTION INTRAMUSCULAR; INTRAVENOUS at 08:41

## 2019-11-15 RX ADMIN — LIDOCAINE HYDROCHLORIDE 0.2 ML: 10 INJECTION, SOLUTION EPIDURAL; INFILTRATION; INTRACAUDAL; PERINEURAL at 07:05

## 2019-11-15 RX ADMIN — IBUPROFEN 600 MG: 600 TABLET, FILM COATED ORAL at 15:01

## 2019-11-15 NOTE — TELEPHONE ENCOUNTER
[facility] paperwork today.     Forms filled out and placed in Dr. Evans's box for signature.    Forms mailed with envelope provided by patient.

## 2019-11-15 NOTE — ANESTHESIA POSTPROCEDURE EVALUATION
Patient: Ele Strickland    Procedure Summary     Date:  11/15/19 Room / Location:   [facility] /  [facility]    Anesthesia Start:  0757 Anesthesia Stop:      Procedure:  TYPE 1 RADICAL TOTAL LAPAROSCOPIC HYSTERECTOMY BILATERAL SALPINGOOPHORECTOMY, LYSIS OF ADHESTIONS, BILATERAL PELVIC LYMPH NODE DISSECTION WITH DAVINCI ROBOT (Bilateral Abdomen) Diagnosis:       Endometrial cancer (CMS/HCC)      (Endometrial cancer (CMS/HCC) [C54.1])    Surgeon:  Zuly Evans MD Provider:  Sonny Montemayor MD    Anesthesia Type:  Not recorded ASA Status:  3          Anesthesia Type: No value filed.  Last vitals  BP   140/72 (11/15/19 0710)   Temp   96.9 °F (36.1 °C) (11/15/19 0710)   Pulse   83 (11/15/19 0710)   Resp   18 (11/15/19 0710)     SpO2   97 % (11/15/19 0710)     Post Anesthesia Care and Evaluation    Patient location during evaluation: PACU  Patient participation: waiting for patient participation  Level of consciousness: responsive to verbal stimuli  Pain score: 0  Pain management: adequate  Airway patency: patent  Anesthetic complications: No anesthetic complications  PONV Status: none  Cardiovascular status: hemodynamically stable and acceptable  Respiratory status: nonlabored ventilation, acceptable and nasal cannula  Hydration status: acceptable

## 2019-11-15 NOTE — CONSULTS
[facility] Medicine Services  CONSULT NOTE      Patient Name: Ele Strickland  : 1950  MRN: [id number]    Primary Care Physician: Sharon Acosta DO  Provider requesting consultation: Zuly Evans MD    Subjective   Subjective     Reason for Consultation:  Endometrial Cancer s/p PARAMJIT/BSO today  Hx dm type II, HTN, COPD and AS.  Hospitalist consult for med mgmt     HPI:  Ele Strickland is a 69 y.o. female with PMH of COPD with ongoing tobacco abuse, HTN, GERD, DM type II, Anxiety/Depression, heart murmur, esophageal achalasia, and post menopausal.  Pt developed post menopausal vaginal bleeding approx 9 months ago. W/u revealed intermediate grade endometrial adenocarcinoma.  Pt was admitted today by Dr Evans and underwent PARAMJIT/BSO and bilateral LND.  Hospitalist service was consulted for co-morbidity medical mgmt post op.        Review of Systems   Constitutional: Negative for appetite change, fatigue and fever.   HENT: Negative.    Eyes: Negative.    Respiratory: Negative.    Cardiovascular: Negative.    Gastrointestinal: Positive for abdominal pain. Negative for abdominal distention, constipation, nausea and vomiting.        Mild post op incisional tenderness today.     Endocrine: Negative.    Genitourinary: Negative.    Musculoskeletal: Negative.    Skin: Positive for wound.        Post op lap sites c/d/i with abd binder in place   Allergic/Immunologic: Negative.    Neurological: Negative.    Hematological: Negative.    Psychiatric/Behavioral: Negative.          Otherwise complete ROS reviewed and is negative except as mentioned in the HPI.    Past Medical History:   Diagnosis Date   • Anxiety and depression    • Cancer (CMS/HCC)    • Chronic back pain    • Chronic bronchitis (CMS/HCC)    • COPD (chronic obstructive pulmonary disease) (CMS/HCC)     no home O2   • Diabetes (CMS/HCC)    • Frequent urination    • Gall stones    • GERD (gastroesophageal reflux disease)    •  Heart murmur    • High blood pressure    • Pinched nerve     back   • Wears dentures    • Wears glasses        Past Surgical History:   Procedure Laterality Date   • ENDOSCOPY     • HELLER MYOTOMY LAPAROSCOPIC WITH ENDOSCOPY  2012       Family History: family history includes Colon cancer in her cousin and paternal aunt; Colon cancer (age of onset: 50) in her half-sister; Heart attack in her brother and mother; Hypertension in her daughter and son; Lung cancer in her brother; Uterine cancer (age of onset: 84) in her mother. Otherwise pertinent FHx was reviewed and unremarkable.     Social History:  reports that she has been smoking cigarettes.  She has a 30.00 pack-year smoking history. She has never used smokeless tobacco. She reports that she does not drink alcohol or use drugs.    Medications:  Medications Prior to Admission   Medication Sig Dispense Refill Last Dose   • albuterol sulfate  (90 Base) MCG/ACT inhaler Inhale 2 puffs Every 4 (Four) Hours As Needed for Wheezing.   11/13/2019   • aspirin 81 MG EC tablet Take 81 mg by mouth Daily.   11/8/2019   • Aspirin-Acetaminophen-Caffeine (EXCEDRIN PO) Take  by mouth.   11/8/2019   • atorvastatin (LIPITOR) 40 MG tablet Take 40 mg by mouth every night at bedtime.  1 11/13/2019   • DULoxetine (CYMBALTA) 60 MG capsule TAKE 1 CAPSULE BY MOUTH ONCE DAILY FOR 90 DAYS  1 11/13/2019   • gabapentin (NEURONTIN) 300 MG capsule Take 300 mg by mouth 3 (Three) Times a Day.  0 11/13/2019   • ibuprofen (ADVIL,MOTRIN) 200 MG tablet Take 200 mg by mouth 2 (Two) Times a Day As Needed for Mild Pain .   11/6/2019   • meloxicam (MOBIC) 7.5 MG tablet Take 1 tablet by mouth As Needed.   11/13/2019   • metFORMIN (GLUCOPHAGE) 1000 MG tablet Take 1,000 mg by mouth 2 (Two) Times a Day.  1 11/13/2019   • Tiotropium Bromide-Olodaterol (STIOLTO RESPIMAT IN) Inhale 2 puffs Daily.   11/13/2019   • triamterene-hydrochlorothiazide (DYAZIDE) 37.5-25 MG per capsule Take 1 capsule by mouth Every  Morning.  1 11/13/2019       Scheduled Meds:  acetaminophen 650 mg Oral Q6H   [START ON 11/16/2019] enoxaparin 40 mg Subcutaneous Daily     Continuous Infusions:  lactated ringers 75 mL/hr Last Rate: 75 mL/hr (11/15/19 1255)     PRN Meds:.HYDROmorphone **AND** naloxone  •  HYDROmorphone **AND** naloxone  •  ibuprofen  •  ondansetron **OR** ondansetron  •  oxyCODONE  •  oxyCODONE  •  promethazine **OR** promethazine **OR** promethazine **OR** promethazine  •  temazepam    Allergies   Allergen Reactions   • Augmentin [Amoxicillin-Pot Clavulanate] GI Intolerance   • Biaxin [Clarithromycin] Hallucinations       Objective   Objective     Vital Signs:   Temp:  [96.9 °F (36.1 °C)-98.8 °F (37.1 °C)] 98.4 °F (36.9 °C)  Heart Rate:  [80-85] 82  Resp:  [16-20] 18  BP: (140-176)/(69-82) 164/70     Physical Exam  Constitutional: No acute distress, awake, alert. Sitting up in bed.  Family at bs  Eyes: PERRLA, sclerae anicteric, no conjunctival injection  HENT: NCAT, mucous membranes moist  Neck: Supple, no thyromegaly, no lymphadenopathy, trachea midline  Respiratory: Clear to auscultation bilaterally A/P, nonlabored respirations on 2LNC  Cardiovascular: RRR, 4/6 murmur heard throughout chest but best heard at Rt upper chest.  No rubs, or gallops, palpable pedal pulses bilaterally  Gastrointestinal: Positive bowel sounds, soft, nondistended.  Mild ttp appropriate to post op today.  abd with 3 lap sites c/d/i with dermabond.  No drains.  Abd binder in place    Musculoskeletal: No bilateral ankle edema, no clubbing or cyanosis to extremities.  SILVA spontaneously  Psychiatric: Appropriate affect, cooperative and calm   Neurologic: Oriented x 3, strength symmetric in all extremities, Cranial Nerves grossly intact to confrontation, speech clear and appropriate.  Follows commands   Skin: No rashes      Results Reviewed:  I have personally reviewed current lab, radiology, and data and agree.    Results from last 7 days   Lab Units  11/14/19  1110   WBC 10*3/mm3 7.44   HEMOGLOBIN g/dL 11.5*   HEMATOCRIT % 38.7   PLATELETS 10*3/mm3 299     Results from last 7 days   Lab Units 11/14/19  1110   SODIUM mmol/L 143   POTASSIUM mmol/L 3.8   CHLORIDE mmol/L 99   CO2 mmol/L 31.0*   BUN mg/dL 17   CREATININE mg/dL 0.58   GLUCOSE mg/dL 139*   CALCIUM mg/dL 10.0   ALT (SGPT) U/L 11   AST (SGOT) U/L 15     Estimated Creatinine Clearance: 65.4 mL/min (by C-G formula based on SCr of 0.58 mg/dL).  Brief Urine Lab Results     None        No results found for: BNP    Microbiology Results Abnormal     None          Imaging Results (Last 24 Hours)     ** No results found for the last 24 hours. **        Results for orders placed during the hospital encounter of 10/30/19   Adult Transthoracic Echo Complete W/ Cont if Necessary Per Protocol    Narrative · Left ventricular systolic function is normal. Calculated EF = 58.0%.   Estimated EF appears to be in the range of 56 - 60%.  · There is moderate aortic valve calcification. There is moderate aortic   stenosis. Peak gradient 59 mmHg, mean gradient 32 mmHg. Dimensionless   index 0.4. Calculated aortic valve area 1.13 cm²  · Mild to moderate aortic regurgitation with a pressure half-time of 577   ms. Vena contract of 0.4 cm  · Sigmoid-shaped ventricular septum is present  · Left ventricular diastolic (grade I) consistent with impaired   relaxation.  · Mild tricuspid valve regurgitation is present. Estimated right   ventricular systolic pressure from tricuspid regurgitation is mildly   elevated (35-45 mmHg).          Assessment/Plan   Assessment / Plan     Active Hospital Problems    Diagnosis POA   • **Endometrial cancer (CMS/HCC) [C54.1] Unknown     Priority: High   • Anxiety and depression [F41.9, F32.9] Unknown   • Cardiomegaly [I51.7] Yes   • Tobacco abuse [Z72.0] Yes   • PMB (postmenopausal bleeding) [N95.0] Yes   • Heart murmur [R01.1] Yes   • Nicotine dependence [F17.200] Yes   • Type 2 diabetes mellitus  without complication (CMS/[facility], [facility]) [E11.9] Yes   • Essential hypertension [I10] Yes   • Chronic obstructive lung disease (CMS/[facility], located [facility]) [J44.9] Yes     Ele Strickland is a 69 y.o. female with PMH of COPD with ongoing tobacco abuse, HTN, GERD, DM type II, Anxiety/Depression, heart murmur, esophageal achalasia, and post menopausal.  Pt developed post menopausal vaginal bleeding approx 9 months ago. W/u revealed intermediate grade endometrial adenocarcinoma.  Pt was admitted today by Dr Evans and underwent PARAMJIT/BSO and bilateral LND.  Hospitalist service was consulted for co-morbidity medical mgmt post op.      Assessment/Plan:    Post menopausal vaginal bleeding  Intermediate grade endometrial adenocarcinoma  --s/p PARAMJIT/BSO and bilateral LNB today per Dr Evans   --post op care per Dr Evans  --am labs     Hx HTN  --home meds  --monitor vitals/labs    Hx COPD  --No home oxygen use  --cleared preop by pulmonary for sx today  --oxygen as needed  --stable, no acute flare     DM type II (A1c 6.5)  --takes oral meds only outpt.  Will hold inpt  --accuchecks achs  --LDSSI  --DM diet    Heart murmur  Hx of known Aortic stenosis  --cleared preop by cardiology for sx today  --NOT new murmur.    --f/u outpt with her cardiologist     Tobacco abuse  --assist/advise  --nicotine patch     DVT prophylaxis:  Teds/seqs  A/c per surgery     Disposition: I expect pt to dc home when cleared by Dr Evans post op. Dr Evans not here tomorrow (Saturday 11/16).  If stable we will dc home.  Dr Evans will leave pain rx on chart today.       Thank you for allowing [facility] Medicine Service to provide consultative care for your patient, we will continue to follow while clinically appropriate.    Electronically signed by ERIKA Oneal, 11/15/19, 3:03 PM.        .

## 2019-11-15 NOTE — OP NOTE
Subjective     Date of Service:  11/15/19  Time of Service:  11:25 AM    Surgical Staff: Surgeon(s) and Role:     * Zuly Evans MD - Primary     * Nimo Sarmiento MD - Resident - Assisting   Additional Staff:    Pre-operative diagnosis(es): Pre-Op Diagnosis Codes:     * Endometrial cancer (CMS/HCC) [C54.1]     Post-operative diagnosis(es): Post-Op Diagnosis Codes:     * Endometrial cancer (CMS/HCC) [C54.1]   Procedure(s): Procedure(s):  TYPE 1 RADICAL TOTAL LAPAROSCOPIC HYSTERECTOMY BILATERAL SALPINGOOPHORECTOMY, LYSIS OF ADHESTIONS, BILATERAL PELVIC LYMPH NODE DISSECTION WITH DAVINCI ROBOT     Antibiotics: cefazolin (Ancef) ordered on call to OR     Anesthesia: Type: General  ASA:  III     Objective      Operative findings:  At the time of laparoscopy, there is a markedly enlarged uterus.  There is a hematoma at the right uterus extending to the right broad ligament of uncertain etiology.  Due to the large size of the specimen, it was placed in a bag in order to facilitate removal.  On frozen section, the cancer was a grade 2 deeply invasive endometrioid adenocarcinoma that came within 3 mm of the uterine serosa.     Specimens removed: ID Type Source Tests Collected by Time   A :  Tissue Uterus with Cervix TISSUE PATHOLOGY EXAM Zuly Evans MD 11/15/2019 0942   B :  Tissue Ovaries, Bilateral with Fallopian Tubes TISSUE PATHOLOGY EXAM Zuly Evans MD 11/15/2019 1003   C : RIGHT PELVIC LYMPH NODES Tissue Lymph Node TISSUE PATHOLOGY EXAM Zuly Evans MD 11/15/2019 1018   D : LEFT PELVIC LYMPH NODE Tissue Lymph Node TISSUE PATHOLOGY EXAM Zuly Evans MD 11/15/2019 1033      Fluid Intake:    Output:   75 mL EBL  No intake/output data recorded.     Blood products used: No   Drains: Urethral Catheter 16 Fr. (Active)      Implant Information: Nothing was implanted during the procedure   Complications: No immediate   Intraoperative consult(s):    Condition: stable   Disposition: to PACU and  then admit to  medical - surgical floor         Indications:    Patient is a pleasant 69-year-old woman who was noted to have a grade 2 endometrial adenocarcinoma.  She had significant comorbidities and underwent extensive perioperative optimization of her medical illnesses.  Risks and benefits were discussed.  Consent was signed and on chart.    Procedure:     After obtaining informed consent, the patient was  taken to the operating room and underwent general endotracheal anesthesia after  patient and site verification. The feet were placed in Shaheen stirrups. The arms were tucked at the sides. Steep Trendelenburg  positioning was tested and found to be adequate. The abdomen, perineum, and  vagina were prepped and draped in the usual sterile fashion. Márquez catheter was  anchored. Retractors were used to visualize the cervix. Cervix was sounded and the appropriate uterine manipulator was called for.  Uterine manipulator was secured with out difficulty.  Attention was turned to the abdomen. Prior  to each incision, skin was injected with 0.5% Marcaine with epinephrine. A 12  mm trocar was inserted at the supra-umbilical midline position in the open  technique without difficulty. Laparoscope was introduced to confirm  positioning. Steep Trendelenburg was called for. The abdomen was insufflated to  a pressure of 15 mmHg with CO2 gas.  2 left-sided 8 mm and 2 right-sided 8 mm trochars were all placed under direct visualization.  The above findings were noted.  Da Quincy robot was docked to the patient and surgeon retired to the operating console.    The peritoneum overlying the pelvic sidewalls was divided with monopolar scissors.  Due to the large size of the specimen, decision was made to divide the utero-ovarian ligaments and fallopian tubes from the uterus using bipolar cautery and monopolar scissors.  Adhesions to the posterior uterus were divided with monopolar scissors.  Likewise, the round ligaments were divided.   Anterior and posterior leaves of the broad ligament were divided with monopolar scissors. A bladder  flap was developed.  Uterine vessels were isolated. Pedicles were created at the level of the uterine vessels using bipolar cautery and scissors. Cardinal ligament and uterosacral ligament complexes were divided in a similar fashion. Monopolar scissors were used to perform a circumferential colpotomy and the specimen was handed off intact via the vagina for frozen section with the above noted results.  Dissection was challenging due to distortion of normal anatomy.  Specimen was placed in a bag in order to facilitate removal from the vagina.    Infundibulopelvic ligaments were isolated from surrounding structures and  pedicles were created using bipolar cautery and scissors.  Adnexa were handed off by the vagina.  Adhesion lysis had to be performed in order to facilitate removal of the specimens.  Bilateral pelvic lymph node dissection was performed  according to the usual anatomic landmarks including the aorta and its  bifurcation, the common iliac arteries and their bifurcations, the external and  internal iliac arteries, the obturator and genitofemoral nerves, and the  umbilical ligament. Pelvic lymph nodes were handed off via the vagina and the  remainder of specimens were handed off via the assistant port. Prior to completion of the surgery, a total of 5 mL of FloSeal was placed in areas of lymph node dissection.    The vaginal cuff was closed with 0 Vicryl suture in a running locking fashion x2 layers. Good hemostasis was noted. Additional hemostasis  was achieved at the pelvis as needed using bipolar cautery. Da Quincy robot  was undocked from the patient and the surgeon returned to the bedside.  Trochars were removed under direct visualization.  CO2 gas was allowed to escape from the abdominal cavity.  0 Vicryl suture was used to reapproximate the fascia at the supra-umbilical midline incision. At that point,  no fascial defects could be palpated.  The skin was closed with 3-0Monocryl in subcuticular stitch and glue was placed at the skin. The vagina was inspected.  Occluder and all instruments had been removed from the vagina.  Good hemostasis was noted at the vagina.  The patient was taken to the recovery room in good condition. There were no immediate complications. All counts were correct.          Zuly Evans MD  11/15/19  11:25 AM

## 2019-11-15 NOTE — ANESTHESIA PREPROCEDURE EVALUATION
Anesthesia Evaluation     Patient summary reviewed and Nursing notes reviewed   NPO Solid Status: > 8 hours  NPO Liquid Status: > 8 hours           Airway   Mallampati: I  TM distance: >3 FB  Neck ROM: full  No difficulty expected  Dental      Pulmonary    (+) a smoker Current, COPD (MDI ) moderate, shortness of breath,   Cardiovascular     ECG reviewed    (+) hypertension, valvular problems/murmurs,   (-) past MI, CAD, dysrhythmias, angina, cardiac stents    ROS comment: ECG normal sinus rhythm  EF = 58.0%.   Moderate aortic STENOSIS and  valve calcification.   Peak gradient 59 mmHg, mean gradient 32 mmHg -aortic valve area 1.13 cm²  Mild to moderate aortic regurgitation   (pressure half-time of 577 ms. Vena contract of 0.4 cm)   Sigmoid-shaped ventricular septum is present  LVDD (grade I) consistent with impaired relaxation.RVSP (TR)  elevated (35-45 mmHg).       Neuro/Psych  (-) seizures, CVA  GI/Hepatic/Renal/Endo    (+)  GERD,  diabetes mellitus type 2,   (-) liver disease, no renal disease, no thyroid disorder    Musculoskeletal     Abdominal    Substance History      OB/GYN      Comment: PMB       Other                        Anesthesia Plan    ASA 3   (Propofol Infusion as part of Anti PONV tech   A Line  (AORTIC STENOSIS -moderate) Flow trac)  intravenous induction     Anesthetic plan, all risks, benefits, and alternatives have been provided, discussed and informed consent has been obtained with: patient.    Plan discussed with CRNA.

## 2019-11-15 NOTE — DISCHARGE INSTRUCTIONS
1) No driving for 1-2 weeks and no longer taking narcotics.   2) May shower / sponge bathe >24 hours after surgery, No tub baths/soaking  3) Do not lift / push / pull more then 20 lb. for 6 weeks  4) Pelvic rest for 6 weeks  5) Constipation is a common postoperative complaint.  Please use a stool softeners and laxatives as needed to facilitate bowel movements.  6) If you are discharged with an abdominal binder, this is to be used as needed for incisional comfort.

## 2019-11-15 NOTE — INTERVAL H&P NOTE
H&P reviewed. The patient was examined and there are no changes to the H&P.     CV:  +cardiac clearance  Resp:  +Pulm clearance    /72 (BP Location: Right arm, Patient Position: Sitting)   Pulse 83   Temp 96.9 °F (36.1 °C) (Temporal)   Resp 18   SpO2 97%   Breastfeeding? No     CV:  S1S2 reg 3/6 LARRY  Resp:  Coarse, unlabored.     ERIKA Nuñez    I saw and evaluated the patient. I agree with the findings and the plan of care as documented in the note.    Zuly Evans MD  11/15/19  7:49 AM

## 2019-11-16 VITALS
HEIGHT: 64 IN | TEMPERATURE: 97.6 F | RESPIRATION RATE: 16 BRPM | HEART RATE: 70 BPM | DIASTOLIC BLOOD PRESSURE: 60 MMHG | WEIGHT: 162.83 LBS | SYSTOLIC BLOOD PRESSURE: 123 MMHG | OXYGEN SATURATION: 95 % | BODY MASS INDEX: 27.8 KG/M2

## 2019-11-16 PROBLEM — I51.7 CARDIOMEGALY: Chronic | Status: ACTIVE | Noted: 2019-10-28

## 2019-11-16 PROBLEM — E11.9 TYPE 2 DIABETES MELLITUS WITHOUT COMPLICATION (HCC): Chronic | Status: ACTIVE | Noted: 2019-07-05

## 2019-11-16 PROBLEM — R01.1 HEART MURMUR: Chronic | Status: ACTIVE | Noted: 2019-10-23

## 2019-11-16 PROBLEM — C54.1 ENDOMETRIAL CANCER (HCC): Chronic | Status: ACTIVE | Noted: 2019-11-07

## 2019-11-16 PROBLEM — Z72.0 TOBACCO ABUSE: Chronic | Status: ACTIVE | Noted: 2019-10-28

## 2019-11-16 PROBLEM — J44.9 CHRONIC OBSTRUCTIVE LUNG DISEASE (HCC): Chronic | Status: ACTIVE | Noted: 2019-07-05

## 2019-11-16 PROBLEM — I10 ESSENTIAL HYPERTENSION: Chronic | Status: ACTIVE | Noted: 2019-07-05

## 2019-11-16 PROBLEM — N95.0 PMB (POSTMENOPAUSAL BLEEDING): Status: RESOLVED | Noted: 2019-10-23 | Resolved: 2019-11-16

## 2019-11-16 PROBLEM — F17.200 NICOTINE DEPENDENCE: Chronic | Status: ACTIVE | Noted: 2019-07-05

## 2019-11-16 LAB
ANION GAP SERPL CALCULATED.3IONS-SCNC: 10 MMOL/L (ref 5–15)
BASOPHILS # BLD AUTO: 0.02 10*3/MM3 (ref 0–0.2)
BASOPHILS NFR BLD AUTO: 0.2 % (ref 0–1.5)
BUN BLD-MCNC: 11 MG/DL (ref 8–23)
BUN/CREAT SERPL: 18.3 (ref 7–25)
CALCIUM SPEC-SCNC: 9.2 MG/DL (ref 8.6–10.5)
CHLORIDE SERPL-SCNC: 101 MMOL/L (ref 98–107)
CO2 SERPL-SCNC: 31 MMOL/L (ref 22–29)
CREAT BLD-MCNC: 0.6 MG/DL (ref 0.57–1)
DEPRECATED RDW RBC AUTO: 43.6 FL (ref 37–54)
EOSINOPHIL # BLD AUTO: 0.01 10*3/MM3 (ref 0–0.4)
EOSINOPHIL NFR BLD AUTO: 0.1 % (ref 0.3–6.2)
ERYTHROCYTE [DISTWIDTH] IN BLOOD BY AUTOMATED COUNT: 14.3 % (ref 12.3–15.4)
GFR SERPL CREATININE-BSD FRML MDRD: 99 ML/MIN/1.73
GLUCOSE BLD-MCNC: 141 MG/DL (ref 65–99)
GLUCOSE BLDC GLUCOMTR-MCNC: 129 MG/DL (ref 70–130)
GLUCOSE BLDC GLUCOMTR-MCNC: 210 MG/DL (ref 70–130)
HCT VFR BLD AUTO: 32.7 % (ref 34–46.6)
HGB BLD-MCNC: 10 G/DL (ref 12–15.9)
IMM GRANULOCYTES # BLD AUTO: 0.03 10*3/MM3 (ref 0–0.05)
IMM GRANULOCYTES NFR BLD AUTO: 0.3 % (ref 0–0.5)
LYMPHOCYTES # BLD AUTO: 2.03 10*3/MM3 (ref 0.7–3.1)
LYMPHOCYTES NFR BLD AUTO: 19.1 % (ref 19.6–45.3)
MCH RBC QN AUTO: 25.7 PG (ref 26.6–33)
MCHC RBC AUTO-ENTMCNC: 30.6 G/DL (ref 31.5–35.7)
MCV RBC AUTO: 84.1 FL (ref 79–97)
MONOCYTES # BLD AUTO: 0.83 10*3/MM3 (ref 0.1–0.9)
MONOCYTES NFR BLD AUTO: 7.8 % (ref 5–12)
NEUTROPHILS # BLD AUTO: 7.69 10*3/MM3 (ref 1.7–7)
NEUTROPHILS NFR BLD AUTO: 72.5 % (ref 42.7–76)
NRBC BLD AUTO-RTO: 0 /100 WBC (ref 0–0.2)
PLATELET # BLD AUTO: 277 10*3/MM3 (ref 140–450)
PMV BLD AUTO: 8.9 FL (ref 6–12)
POTASSIUM BLD-SCNC: 3.9 MMOL/L (ref 3.5–5.2)
RBC # BLD AUTO: 3.89 10*6/MM3 (ref 3.77–5.28)
SODIUM BLD-SCNC: 142 MMOL/L (ref 136–145)
WBC NRBC COR # BLD: 10.61 10*3/MM3 (ref 3.4–10.8)

## 2019-11-16 PROCEDURE — 94799 UNLISTED PULMONARY SVC/PX: CPT

## 2019-11-16 PROCEDURE — 99239 HOSP IP/OBS DSCHRG MGMT >30: CPT | Performed by: FAMILY MEDICINE

## 2019-11-16 PROCEDURE — 80048 BASIC METABOLIC PNL TOTAL CA: CPT | Performed by: OBSTETRICS & GYNECOLOGY

## 2019-11-16 PROCEDURE — 82962 GLUCOSE BLOOD TEST: CPT

## 2019-11-16 PROCEDURE — 85025 COMPLETE CBC W/AUTO DIFF WBC: CPT | Performed by: OBSTETRICS & GYNECOLOGY

## 2019-11-16 PROCEDURE — 25010000002 ENOXAPARIN PER 10 MG: Performed by: OBSTETRICS & GYNECOLOGY

## 2019-11-16 RX ORDER — OXYCODONE HYDROCHLORIDE 5 MG/1
5 TABLET ORAL EVERY 4 HOURS PRN
Qty: 10 TABLET | Refills: 0
Start: 2019-11-16 | End: 2021-01-01

## 2019-11-16 RX ADMIN — ACETAMINOPHEN 650 MG: 325 TABLET ORAL at 01:16

## 2019-11-16 RX ADMIN — ACETAMINOPHEN 650 MG: 325 TABLET ORAL at 07:11

## 2019-11-16 RX ADMIN — ENOXAPARIN SODIUM 40 MG: 40 INJECTION SUBCUTANEOUS at 10:13

## 2019-11-16 RX ADMIN — ALBUTEROL SULFATE 2.5 MG: 2.5 SOLUTION RESPIRATORY (INHALATION) at 08:05

## 2019-11-16 NOTE — PLAN OF CARE
Problem: Patient Care Overview  Goal: Plan of Care Review  Outcome: Ongoing (interventions implemented as appropriate)   11/16/19 0609   Coping/Psychosocial   Plan of Care Reviewed With patient   Plan of Care Review   Progress improving   OTHER   Outcome Summary VSS. Adequate I&O. Pain controlled with scheduled meds. Rested well. IVONNE thompson in am.        Problem: Hysterectomy (Adult)  Goal: Signs and Symptoms of Listed Potential Problems Will be Absent, Minimized or Managed (Hysterectomy)  Outcome: Ongoing (interventions implemented as appropriate)    Goal: Anesthesia/Sedation Recovery  Outcome: Ongoing (interventions implemented as appropriate)      Problem: Pain, Chronic (Adult)  Goal: Identify Related Risk Factors and Signs and Symptoms  Outcome: Ongoing (interventions implemented as appropriate)    Goal: Acceptable Pain/Comfort Level and Functional Ability  Outcome: Ongoing (interventions implemented as appropriate)

## 2019-11-16 NOTE — DISCHARGE SUMMARY
[facility] Medicine Services  DISCHARGE SUMMARY    Patient Name: Ele Strickland  : 1950  MRN: [id number]    Date of Admission: 11/15/2019  6:47 AM  Date of Discharge:  19    Primary Care Physician: Sharon Acosta DO      Hospital Course     Presenting Problem:   Endometrial cancer (CMS/HCC) [C54.1]      Active Hospital Problems    Diagnosis  POA   • **Endometrial cancer (CMS/HCC) [C54.1]  Yes   • Anxiety and depression [F41.9, F32.9]  Yes   • Cardiomegaly [I51.7]  Yes   • Tobacco abuse [Z72.0]  Yes   • Heart murmur [R01.1]  Yes   • Nicotine dependence [F17.200]  Yes   • Type 2 diabetes mellitus without complication (CMS/HCC) [E11.9]  Yes   • Essential hypertension [I10]  Yes   • Chronic obstructive lung disease (CMS/HCC) [J44.9]  Yes      Resolved Hospital Problems    Diagnosis Date Resolved POA   • PMB (postmenopausal bleeding) [N95.0] 2019 Yes          Hospital Course:  Ele Strickland is a 69 y.o. female with PMH of COPD with ongoing tobacco abuse, HTN, GERD, DM type II, Anxiety/Depression, heart murmur, esophageal achalasia, and post menopausal.  Pt developed post menopausal vaginal bleeding approx 9 months ago. W/u revealed intermediate grade endometrial adenocarcinoma.  Pt was admitted by Dr Evans and underwent PARAMJIT/BSO and bilateral LND.  Her postoperative course was unremarkable, she was tolerating oral food and medications, voiding spontaneously after postop only removed.      Day of Discharge     HPI:   Spontaneous void after Márquez removal this morning.  Tolerated breakfast and lunch without difficulty, no nausea. Pain well controlled, no active complaints.  She understands her discharge wound care instructions.    Review of Systems  Otherwise ROS is negative except as mentioned in the HPI.    Vital Signs:   Temp:  [98 °F (36.7 °C)-99 °F (37.2 °C)] 98.4 °F (36.9 °C)  Heart Rate:  [61-85] 64  Resp:  [16-20] 16  BP: (108-176)/(55-82) 119/59     Physical  Exam:  Constitutional: No acute distress, awake, alert, nontoxic, normal body habitus  Respiratory: Clear to auscultation bilaterally, good effort, nonlabored respirations   Cardiovascular: RRR, no murmur  Gastrointestinal: Soft, appropriately POD #1 tenderness, nondistended  Musculoskeletal: No peripheral edema, normal muscle tone for age  Psychiatric: Appropriate affect, good insight and judgement, cooperative      Pertinent  and/or Most Recent Results     Results from last 7 days   Lab Units 11/16/19  0421 11/14/19  1110 11/11/19  1603   WBC 10*3/mm3 10.61 7.44  --    HEMOGLOBIN g/dL 10.0* 11.5*  --    HEMATOCRIT % 32.7* 38.7  --    PLATELETS 10*3/mm3 277 299  --    SODIUM mmol/L 142 143  --    POTASSIUM mmol/L 3.9 3.8  --    CHLORIDE mmol/L 101 99  --    CO2 mmol/L 31.0* 31.0*  --    BUN mg/dL 11 17  --    CREATININE mg/dL 0.60 0.58 0.60   GLUCOSE mg/dL 141* 139*  --    CALCIUM mg/dL 9.2 10.0  --      Results from last 7 days   Lab Units 11/14/19  1110   BILIRUBIN mg/dL 0.3   ALK PHOS U/L 95   ALT (SGPT) U/L 11   AST (SGOT) U/L 15           Invalid input(s): TG, LDLCALC, LDLREALC  Results from last 7 days   Lab Units 11/14/19  1110   HEMOGLOBIN A1C % 6.50*       Brief Urine Lab Results     None          Microbiology Results Abnormal     None          Imaging Results (All)     None                    Results for orders placed during the hospital encounter of 10/30/19   Adult Transthoracic Echo Complete W/ Cont if Necessary Per Protocol    Narrative · Left ventricular systolic function is normal. Calculated EF = 58.0%.   Estimated EF appears to be in the range of 56 - 60%.  · There is moderate aortic valve calcification. There is moderate aortic   stenosis. Peak gradient 59 mmHg, mean gradient 32 mmHg. Dimensionless   index 0.4. Calculated aortic valve area 1.13 cm²  · Mild to moderate aortic regurgitation with a pressure half-time of 577   ms. Vena contract of 0.4 cm  · Sigmoid-shaped ventricular septum is  present  · Left ventricular diastolic (grade I) consistent with impaired   relaxation.  · Mild tricuspid valve regurgitation is present. Estimated right   ventricular systolic pressure from tricuspid regurgitation is mildly   elevated (35-45 mmHg).           Order Current Status    Tissue Pathology Exam In process        Discharge Details        Discharge Medications      New Medications      Instructions Start Date   oxyCODONE 5 MG immediate release tablet  Commonly known as:  ROXICODONE   5 mg, Oral, Every 6 Hours PRN         Continue These Medications      Instructions Start Date   albuterol sulfate  (90 Base) MCG/ACT inhaler  Commonly known as:  PROVENTIL HFA;VENTOLIN HFA;PROAIR HFA   2 puffs, Inhalation, Every 4 Hours PRN      aspirin 81 MG EC tablet   81 mg, Oral, Daily      atorvastatin 40 MG tablet  Commonly known as:  LIPITOR   40 mg, Oral, Every Night at Bedtime      DULoxetine 60 MG capsule  Commonly known as:  CYMBALTA   TAKE 1 CAPSULE BY MOUTH ONCE DAILY FOR 90 DAYS      EXCEDRIN PO   Oral      gabapentin 300 MG capsule  Commonly known as:  NEURONTIN   300 mg, Oral, 3 Times Daily      ibuprofen 200 MG tablet  Commonly known as:  ADVIL,MOTRIN   200 mg, Oral, 2 Times Daily PRN      metFORMIN 1000 MG tablet  Commonly known as:  GLUCOPHAGE   1,000 mg, Oral, 2 Times Daily      MOBIC 7.5 MG tablet  Generic drug:  meloxicam   1 tablet, Oral, As Needed      STIOLTO RESPIMAT IN   2 puffs, Inhalation, Daily      triamterene-hydrochlorothiazide 37.5-25 MG per capsule  Commonly known as:  DYAZIDE   1 capsule, Oral, Every Morning             Allergies   Allergen Reactions   • Augmentin [Amoxicillin-Pot Clavulanate] GI Intolerance   • Biaxin [Clarithromycin] Hallucinations         Discharge Disposition:  Home or Self Care    Diet:  Hospital:  Diet Order   Procedures   • Diet Regular; Cardiac, Consistent Carbohydrate, GI Soft          CODE STATUS:    Code Status and Medical Interventions:   Ordered at: 11/15/19  1256     Code Status:    CPR     Medical Interventions (Level of Support Prior to Arrest):    Full       Future Appointments   Date Time Provider Department Center   12/2/2019 10:15 AM [facility] CT 1 BH [facility] CT [facility]   12/6/2019 10:00 AM Rachel Nair APRN [facility] None   4/30/2020  9:30 AM Angie Goddard PA [facility] C [facility] None           Time Spent on Discharge:  35 minutes    Electronically signed by Elvira Acosta MD, 11/16/19, 11:25 AM.

## 2019-11-17 ENCOUNTER — READMISSION MANAGEMENT (OUTPATIENT)
Dept: CALL CENTER | Facility: HOSPITAL | Age: 69
End: 2019-11-17

## 2019-11-17 NOTE — OUTREACH NOTE
Prep Survey      Responses   Facility patient discharged from?  [place]   Is patient eligible?  Yes   Discharge diagnosis  Endometrial cancer,    Total Laparoscopic Hysterectomy   Does the patient have one of the following disease processes/diagnoses(primary or secondary)?  General Surgery   Does the patient have Home health ordered?  No   Is there a DME ordered?  No   Prep survey completed?  Yes          Rachel Sanchez RN

## 2019-11-19 ENCOUNTER — READMISSION MANAGEMENT (OUTPATIENT)
Dept: CALL CENTER | Facility: HOSPITAL | Age: 69
End: 2019-11-19

## 2019-11-19 ENCOUNTER — TELEPHONE (OUTPATIENT)
Dept: GYNECOLOGIC ONCOLOGY | Facility: CLINIC | Age: 69
End: 2019-11-19

## 2019-11-19 NOTE — OUTREACH NOTE
General Surgery Week 1 Survey      Responses   Facility patient discharged from?  [place]   Does the patient have one of the following disease processes/diagnoses(primary or secondary)?  General Surgery   Is there a successful TCM telephone encounter documented?  No   Week 1 attempt successful?  No   Unsuccessful attempts  Attempt 1          Mckayla Pichardo RN

## 2019-11-19 NOTE — TELEPHONE ENCOUNTER
I called pt to discuss final pathology:  Stage II  Will need adjuvant radiation.  I left my office phone number on her voicemail and that I was calling about her pathology report.

## 2019-11-21 ENCOUNTER — READMISSION MANAGEMENT (OUTPATIENT)
Dept: CALL CENTER | Facility: HOSPITAL | Age: 69
End: 2019-11-21

## 2019-11-21 NOTE — OUTREACH NOTE
General Surgery Week 1 Survey      Responses   Facility patient discharged from?  [place]   Does the patient have one of the following disease processes/diagnoses(primary or secondary)?  General Surgery   Is there a successful TCM telephone encounter documented?  No   Week 1 attempt successful?  Yes   Call start time  1533   Call end time  1541   Discharge diagnosis  Endometrial cancer,    Total Laparoscopic Hysterectomy   Is patient permission given to speak with other caregiver?  Yes   Meds reviewed with patient/caregiver?  Yes   Is the patient having any side effects they believe may be caused by any medication additions or changes?  No   Does the patient have all medications related to this admission filled (includes all antibiotics, pain medications, etc.)  Yes   Is the patient taking all medications as directed (includes completed medication regime)?  Yes   Does the patient have a follow up appointment scheduled with their surgeon?  Yes   Has the patient kept scheduled appointments due by today?  N/A   Has home health visited the patient within 72 hours of discharge?  N/A   Psychosocial issues?  No   Did the patient receive a copy of their discharge instructions?  Yes   Nursing interventions  Reviewed instructions with patient   What is the patient's perception of their health status since discharge?  Improving   Nursing interventions  Nurse provided patient education   Is the patient /caregiver able to teach back basic post-op care?  Continue use of incentive spirometry at least 1 week post discharge, Practice 'cough and deep breath', Lifting as instructed by MD in discharge instructions, Do not remove steri-strips, Drive as instructed by MD in discharge instructions, Take showers only when approved by MD-sponge bathe until then, No tub bath, swimming, or hot tub until instructed by MD, Keep incision areas clean,dry and protected   Is the patient/caregiver able to teach back signs and symptoms of incisional  infection?  Increased redness, swelling or pain at the incisonal site, Increased drainage or bleeding, Incisional warmth, Fever, Pus or odor from incision   Is the patient/caregiver able to teach back steps to recovery at home?  Set small, achievable goals for return to baseline health, Make a list of questions for surgeon's appointment, Eat a well-balance diet   Is the patient/caregiver able to teach back the hierarchy of who to call/visit for symptoms/problems? PCP, Specialist, Home health nurse, Urgent Care, ED, 911  Yes   Additional teach back comments  Encouraged f/u appt w/ PCP next week, she states she is tired and incisions are good.   Week 1 call completed?  Yes          Vonda Eaton RN

## 2019-11-27 ENCOUNTER — TELEPHONE (OUTPATIENT)
Dept: GYNECOLOGIC ONCOLOGY | Facility: CLINIC | Age: 69
End: 2019-11-27

## 2019-11-27 DIAGNOSIS — C54.1 ENDOMETRIAL CANCER (HCC): Primary | ICD-10-CM

## 2019-11-27 NOTE — TELEPHONE ENCOUNTER
I called pt again to discuss pathology.  I reviewed NCCN guidelines.  Vaginal brachytherapy plus minus EBRT.  I discussed with patient vaginal brachytherapy.  She has multiple questions particularly regarding return to work.  I am going to go ahead make a referral for her to see radiation oncology so she can work on getting her treatment scheduled.  She verbalized understanding.

## 2019-11-30 ENCOUNTER — READMISSION MANAGEMENT (OUTPATIENT)
Dept: CALL CENTER | Facility: HOSPITAL | Age: 69
End: 2019-11-30

## 2019-11-30 NOTE — OUTREACH NOTE
General Surgery Week 2 Survey      Responses   Facility patient discharged from?  [place]   Does the patient have one of the following disease processes/diagnoses(primary or secondary)?  General Surgery   Week 2 attempt successful?  No   Unsuccessful attempts  Attempt 1          Blanca Castillo RN

## 2019-11-30 NOTE — OUTREACH NOTE
General Surgery Week 1 Survey      Responses   Facility patient discharged from?  [place]   Does the patient have one of the following disease processes/diagnoses(primary or secondary)?  General Surgery          Blanca Castillo RN

## 2019-12-02 ENCOUNTER — APPOINTMENT (OUTPATIENT)
Dept: CT IMAGING | Facility: HOSPITAL | Age: 69
End: 2019-12-02

## 2019-12-03 ENCOUNTER — READMISSION MANAGEMENT (OUTPATIENT)
Dept: CALL CENTER | Facility: HOSPITAL | Age: 69
End: 2019-12-03

## 2019-12-03 NOTE — OUTREACH NOTE
General Surgery Week 2 Survey      Responses   Facility patient discharged from?  [place]   Does the patient have one of the following disease processes/diagnoses(primary or secondary)?  General Surgery   Week 2 attempt successful?  No   Unsuccessful attempts  Attempt 2          Gladis Quintero RN

## 2019-12-05 ENCOUNTER — OFFICE VISIT (OUTPATIENT)
Dept: GYNECOLOGIC ONCOLOGY | Facility: CLINIC | Age: 69
End: 2019-12-05

## 2019-12-05 VITALS
BODY MASS INDEX: 28.99 KG/M2 | SYSTOLIC BLOOD PRESSURE: 162 MMHG | OXYGEN SATURATION: 95 % | DIASTOLIC BLOOD PRESSURE: 68 MMHG | WEIGHT: 169 LBS | RESPIRATION RATE: 10 BRPM | TEMPERATURE: 97.9 F | HEART RATE: 78 BPM

## 2019-12-05 DIAGNOSIS — C54.1 ENDOMETRIAL CANCER (HCC): Primary | Chronic | ICD-10-CM

## 2019-12-05 PROCEDURE — 99213 OFFICE O/P EST LOW 20 MIN: CPT | Performed by: OBSTETRICS & GYNECOLOGY

## 2019-12-05 NOTE — PROGRESS NOTES
Ele Strickland  [id number]  1950      Reason for Visit:  Treatment planning for newly diagnosed Stage 2 endometrial cancer, Postoperative evaluation    History of Present Illness:  Patient is a very pleasant 69 y.o. woman who presents for a post operative evaluation status post robot-assisted type I radical total laparoscopic hysterectomy and bilateral salpingo-oophorectomy, bilateral pelvic lymph node dissection performed on November 15, 2019.      Surgery and hospital course were uncomplicated.  Today, patient notes normal bowel and bladder function.  Her pain is well controlled. She has questions about resuming normal activities.     Past Medical History, Past Surgical History, Social History, Family History have been reviewed and are without significant changes except as mentioned.    Review of Systems   All other systems were reviewed and are negative except as mentioned above.    Medications:  The current medication list was reviewed in the EMR    ALLERGIES:    Allergies   Allergen Reactions   • Augmentin [Amoxicillin-Pot Clavulanate] GI Intolerance   • Biaxin [Clarithromycin] Hallucinations           /68   Pulse 78   Temp 97.9 °F (36.6 °C)   Resp 10   Wt 76.7 kg (169 lb)   SpO2 95%   BMI 28.99 kg/m²        Physical Exam  Constitutional:  Patient is a pleasant woman in no acute distress.  Gastrointestinal: Abdomen is soft and appropriately tender.  There is no mass palpated.  There is no rebound or guarding.  Incision(s) is clean, dry and intact.  Extremities:  Bilateral lower extremities are non-tender.  Gynecologic: External genitalia are free from lesion. On speculum examination, the vaginal cuff was intact and no lesions were appreciated.  On bimanual examination, no fullness was appreciated.  Uterus, cervix and adnexa were absent.  There was no significant tenderness.  Rectovaginal exam was deferred.      PATHOLOGY:  Final Diagnosis   1. UTERUS, HYSTERECTOMY:  Invasive endometrioid  adenocarcinoma, FIGO 2.  Tumor measures 9.5x6.0x2.5 cm.  Tumor invades greater than half the thickness of the myometrium to within 1.5 mm of the serosal surface.   Tumor extends into endocervix and invades into cervical stroma.   Benign leiomyomata, intramural.  Chronic cervicitis with squamous metaplasia.  2. RIGHT AND LEFT FALLOPIAN TUBES AND OVARIES, BILATERAL SALPINGO-OOPHORECTOMY:  Benign fallopian tubes and ovaries.  Negative for carcinoma.  3. RIGHT PELVIC LYMPH NODES, EXCISION:  Two benign lymph nodes negative for carcinoma (0/2).  4. LEFT PELVIC LYMPH NODES, RESECTION:  Four benign lymph nodes negative for carcinoma (0/4).     UTERUS ENDOMETRIUM TEMPLATE:  SPECIMEN TYPE/ PROCEDURE:  Hysterectomy with bilateral salpingo-oophorectomy  LYMPH NODE SAMPLING:  Yes  SPECIMEN INTEGRITY:   Intact   TUMOR SIZE (greatest dimension):  9.5x6.0x2.5 cm  HISTOLOGIC TYPE:  Endometrioid adenocarcinoma with focal mucinous and secretory features   HISTOLOGIC GRADE:  FIGO grade 2  MYOMETRIAL INVASION (Present/Not identified):  Present                DEPTH OF INVASION (mm): 15.5 mm               MYOMETRIAL THICKNESS (mm): 17 mm               PERCENTAGE OF MYOMETRIAL INVASION: 91%  INVOLVEMENT OF CERVICAL STROMA:  Tumor invades endocervical stroma  EXTENT OF INVOLVEMENT OF OTHER TISSUE/ORGANS:  Not identified   UTERINE SEROSA INVOLVEMENT: Not identified   MARGINS:  Free   PELVIC LYMPH NODES (SUBMITTED/NONE): Submitted   NUMBER OF PELVIC LYMPH NODES EXAMINED: 6  NUMBER OF PELVIC SENTINEL NODES EXAMINED: 0  LATERALITY OF PELVIC LYMPH NODES EXAMINED: Bilateral  NUMBER OF PELVIC LYMPH NODES WITH MACROMETASTASIS: 0  NUMBER OF PELVIC LYMPH NODES WITH MICROMETASTASIS: 0  NUMBER OF PELVIC LYMPH NODES WITH ITC’S: 0  PARA-AORTIC LYMPH NODES (SUBMITTED/NONE): None   LYMPHVASCULAR INVASION:  Not identified   MSI TESTING (under age 60):   Results to be issued as an Addendum  ADDITIONAL PATHOLOGIC FINDINGS:  Leiomyomata, intramural  ANCILLARY  STUDIES:  Available upon request  AJCC PATHOLOGIC STAGE:  (COMPLETED BY PATHOLOGIST, BASED ONLY ON TISSUE FINDINGS, MORE EXTENSIVE DISEASE MAY NOT BE KNOWN TO THE PATHOLOGIST)  pT=  2  pN=  0  pM=  0  AJCC PATHOLOGIC STAGE:  II         ASSESSMENT/PLAN:  Ele Strickland returns for a post-operative evaluation today.  All pathology reports were given to patient. She is healing well. No concerns today.     Discussed with patient her diagnosis of stage II endometrial adenocarcinoma.  Reviewed NCCN guidelines with patient.  Discussed recommendations for ERBT and/or vaginal brachytherapy.  Patient has a CT scan of her chest scheduled for tomorrow.  She has an appointment with radiation oncology the first week of January.  Continued to encourage smoking cessation.      Overall, the patient is very pleased with her care.  I recommended continuation of post operative precautions as discussed.     She is to follow up for survivorship visit after completion of radiation treatments.     Patient was seen and examined with Dr. Ignacio,  resident, who performed portions of the examination and documentation for this patient's care under my direct supervision.  I agree with the above documentation and plan.    Zuly Evans MD  12/05/19  4:13 PM

## 2019-12-06 ENCOUNTER — OFFICE VISIT (OUTPATIENT)
Dept: PULMONOLOGY | Facility: CLINIC | Age: 69
End: 2019-12-06

## 2019-12-06 ENCOUNTER — HOSPITAL ENCOUNTER (OUTPATIENT)
Dept: CT IMAGING | Facility: HOSPITAL | Age: 69
Discharge: HOME | End: 2019-12-06
Admitting: INTERNAL MEDICINE

## 2019-12-06 VITALS
BODY MASS INDEX: 28.54 KG/M2 | WEIGHT: 167.2 LBS | HEIGHT: 64 IN | HEART RATE: 80 BPM | SYSTOLIC BLOOD PRESSURE: 126 MMHG | OXYGEN SATURATION: 95 % | TEMPERATURE: 97.8 F | DIASTOLIC BLOOD PRESSURE: 86 MMHG

## 2019-12-06 DIAGNOSIS — K21.9 CHRONIC GERD: ICD-10-CM

## 2019-12-06 DIAGNOSIS — R06.09 DYSPNEA ON EXERTION: Primary | ICD-10-CM

## 2019-12-06 DIAGNOSIS — R06.09 DYSPNEA ON EXERTION: ICD-10-CM

## 2019-12-06 DIAGNOSIS — Z72.0 TOBACCO ABUSE: ICD-10-CM

## 2019-12-06 DIAGNOSIS — I05.2 MITRAL VALVE STENOSIS AND REGURGITATION: ICD-10-CM

## 2019-12-06 DIAGNOSIS — R05.3 CHRONIC COUGH: ICD-10-CM

## 2019-12-06 PROCEDURE — 71250 CT THORAX DX C-: CPT

## 2019-12-06 PROCEDURE — 99214 OFFICE O/P EST MOD 30 MIN: CPT | Performed by: NURSE PRACTITIONER

## 2019-12-06 NOTE — PROGRESS NOTES
"[facility] Pulmonary Follow Up Note    Chief Complaint:  Chief Complaint   Patient presents with   • Cough   • Follow-up       History of Present Illness:  Ele Strickland is a 69 y.o.female here today for Follow-up visit. She was seen in the office on 10/25/19 by Dr Yogi Boyd for an initial preoperative evaluation.     Patient was referred by Dr. Crowell for a preoperative evaluation and was cleared from a pulmonary standpoint. Pt developed post menopausal vaginal bleeding approximately 9 months ago. Workup revealed endometrial adenocarcinoma. Pt was recently admitted by Dr Evans and underwent PARAMJIT/BSO and bilateral LND on 11/15/19. She has an appointment with oncology radiology next month for probable postsurgical radiation.     She has a long history of tobacco abuse that has consisted of 1 or more packs of cigarettes per day. She continues to smoke 1 ppd at present. At this point she does not have any plans for smoking cessation. She primarily has dyspnea only on exertion. She has a chronic cough on a daily basis. She typically produces a small amount of thick white sputum, but denies hemoptysis. She was switched from Qvar to Stiolto at last visit and is doing well with it.     Dr Boyd ordered HRCT and CT WO and she was here to follow up with those results, however she just had her scans done this morning prior to her appointment at 10 am. They are not currently reviewable.     She has regular reflux symptoms. She is trying to utilize reflux precautions discussed at last office visit. She intermittently takes Nexium. She has not had many issues since she had surgery for esophageal achalasia. She does feel that she might need a follow up EGD.     She has a longstanding murmur that was previously noted to be \"innocent\" with chronic lower extremity edema. Dr Boyd recommended a cardiac evaluation prior to surgery. She was seen by Cardiology and had ECHO done. She was noted to have moderate aortic " stenosis with mild to moderate aortic regurgitation. Left ventricular diastolic (grade I) consistent with impaired relaxation with preserved EF 58%. RVSP 35-45.     I have reviewed the patients past medical, past surgical and family history as noted in Epic.     Subjective     Social History     Socioeconomic History   • Marital status:      Spouse name: Not on file   • Number of children: 2   • Years of education: Not on file   • Highest education level: Not on file   Tobacco Use   • Smoking status: Current Every Day Smoker     Packs/day: 1.00     Years: 30.00     Pack years: 30.00     Types: Cigarettes   • Smokeless tobacco: Never Used   Substance and Sexual Activity   • Alcohol use: No     Frequency: Never   • Drug use: No   • Sexual activity: No   Social History Narrative        Has two children    Works as a  at [employer]    Continues to smoke at least 1 pack a day as she has her adult life    Denies regular alcohol use         Current Outpatient Medications:   •  albuterol sulfate  (90 Base) MCG/ACT inhaler, Inhale 2 puffs Every 4 (Four) Hours As Needed for Wheezing., Disp: , Rfl:   •  aspirin 81 MG EC tablet, Take 81 mg by mouth Daily., Disp: , Rfl:   •  Aspirin-Acetaminophen-Caffeine (EXCEDRIN PO), Take  by mouth., Disp: , Rfl:   •  atorvastatin (LIPITOR) 40 MG tablet, Take 40 mg by mouth every night at bedtime., Disp: , Rfl: 1  •  DULoxetine (CYMBALTA) 60 MG capsule, TAKE 1 CAPSULE BY MOUTH ONCE DAILY FOR 90 DAYS, Disp: , Rfl: 1  •  gabapentin (NEURONTIN) 300 MG capsule, Take 300 mg by mouth 3 (Three) Times a Day., Disp: , Rfl: 0  •  ibuprofen (ADVIL,MOTRIN) 200 MG tablet, Take 200 mg by mouth 2 (Two) Times a Day As Needed for Mild Pain ., Disp: , Rfl:   •  meloxicam (MOBIC) 7.5 MG tablet, Take 1 tablet by mouth As Needed., Disp: , Rfl:   •  metFORMIN (GLUCOPHAGE) 1000 MG tablet, Take 1,000 mg by mouth 2 (Two) Times a Day., Disp: , Rfl: 1  •  oxyCODONE (ROXICODONE) 5  "MG immediate release tablet, Take 1 tablet by mouth Every 4-6 Hours As Needed for Moderate Pain ., Disp: 10 tablet, Rfl: 0  •  Tiotropium Bromide-Olodaterol (STIOLTO RESPIMAT IN), Inhale 2 puffs Daily., Disp: , Rfl:   •  triamterene-hydrochlorothiazide (DYAZIDE) 37.5-25 MG per capsule, Take 1 capsule by mouth Every Morning., Disp: , Rfl: 1    Allergies   Allergen Reactions   • Augmentin [Amoxicillin-Pot Clavulanate] GI Intolerance   • Biaxin [Clarithromycin] Hallucinations         There is no immunization history on file for this patient.    Review of Systems:    Review of Systems   Constitutional: Negative for chills, fatigue and fever.   HENT: Positive for postnasal drip. Negative for congestion, rhinorrhea, sinus pressure, trouble swallowing and voice change.    Eyes: Positive for blurred vision. Negative for visual disturbance.   Respiratory: Positive for cough. Negative for apnea, chest tightness, shortness of breath and wheezing.    Cardiovascular: Positive for leg swelling. Negative for chest pain and palpitations.   Gastrointestinal: Positive for GERD. Negative for abdominal pain, nausea and vomiting.   Endocrine: Negative for cold intolerance and heat intolerance.   Genitourinary: Negative for dysuria and hematuria.   Musculoskeletal: Positive for arthralgias and back pain. Negative for joint swelling.   Skin: Negative for rash and bruise.   Allergic/Immunologic: Negative for environmental allergies, food allergies and immunocompromised state.   Neurological: Negative for dizziness, speech difficulty, weakness and headache.   Hematological: Negative for adenopathy. Does not bruise/bleed easily.   Psychiatric/Behavioral: Negative for dysphoric mood. The patient is not nervous/anxious.        Objective     Vital Signs:  Vitals:    12/06/19 1013   BP: 126/86   Pulse: 80   Temp: 97.8 °F (36.6 °C)   SpO2: 95%  Comment: resting, room air   Weight: 75.8 kg (167 lb 3.2 oz)   Height: 162.6 cm (64.02\")       Physical " Exam:    Physical Exam   Constitutional: She is oriented to person, place, and time. She appears well-developed and well-nourished.   HENT:   Head: Normocephalic and atraumatic.   Mouth/Throat: Oropharynx is clear and moist.   Eyes: EOM are normal. Pupils are equal, round, and reactive to light.   Neck: Normal range of motion. Neck supple.   Cardiovascular: Normal rate and regular rhythm.   Murmur heard.  Pulmonary/Chest: Effort normal and breath sounds normal. No respiratory distress. She has no wheezes.   Abdominal: Soft. Bowel sounds are normal.   Musculoskeletal: Normal range of motion. She exhibits edema (trace BLE).   Neurological: She is alert and oriented to person, place, and time.   Skin: Skin is warm and dry.   Psychiatric: She has a normal mood and affect. Judgment normal.   Nursing note and vitals reviewed.      Results Review:    I reviewed the patient's new clinical results.    Ct Chest Without Contrast    Result Date: 12/6/2019  Findings which appear postinflammatory in nature.  D:  12/06/2019 E:  12/06/2019    This report was finalized on 12/6/2019 12:57 PM by Dr. George Urban MD.      Ct Abdomen Pelvis With Contrast    Result Date: 11/16/2019  1. Lobular hypoenhancing mass in the uterus and apparently also the cervix, presumably the patient's known neoplasm. 2. Incidental right lobe liver hemangioma, present on previous 11/01/2011 gallbladder ultrasound. 3. No evidence of metastatic disease or other acute intraabdominal or intrapelvic disease is seen elsewhere.  D:  11/12/2019 E:  11/13/2019  This report was finalized on 11/16/2019 10:22 AM by DR. Lee Rodriguez MD.      Ct Chest Hi Resolution    Result Date: 12/6/2019  There are some mild chronic peripheral interstitial changes. There is no hipolito fibrosis and there is no bronchiectasis.  D:  12/06/2019 E:  12/06/2019  This report was finalized on 12/6/2019 12:57 PM by Dr. George Urban MD.      ECHO 10/30/19    Interpretation Summary     · Left  ventricular systolic function is normal. Calculated EF = 58.0%. Estimated EF appears to be in the range of 56 - 60%.  · There is moderate aortic valve calcification. There is moderate aortic stenosis. Peak gradient 59 mmHg, mean gradient 32 mmHg. Dimensionless index 0.4. Calculated aortic valve area 1.13 cm²  · Mild to moderate aortic regurgitation with a pressure half-time of 577 ms. Vena contract of 0.4 cm  · Sigmoid-shaped ventricular septum is present  · Left ventricular diastolic (grade I) consistent with impaired relaxation.  · Mild tricuspid valve regurgitation is present. Estimated right ventricular systolic pressure from tricuspid regurgitation is mildly elevated (35-45 mmHg).       Assessment/Plan   Assessment / Plan:    Problem List Items Addressed This Visit        Respiratory    Dyspnea on exertion - Primary       Digestive    Chronic GERD       Other    Tobacco abuse (Chronic)      Other Visit Diagnoses     Chronic cough        Mitral valve stenosis and regurgitation              Discussion:    Patient is here today for follow up post testing. A HRCT Chest was ordered to check for underlying bronchiectasis/ ILD. A routine CT Chest was ordered given her smoking history. Unfortunately the patient had both scans done right before her appointment this morning. Imaging nor reports are available at time of visit. I will call patient once I have them for review.     She reports symptoms of chronic bronchitis but has preserved lung function per PFT at last office visit. No airway obstruction, no restriction, and normal diffusion was noted. She was switched from ICS to LAMA/LABA. She feels that she has done better with the change. She will continue using Stiolto 2 inhalations daily.      Reflux may be contributing to her symptoms. We review reflux precautions again and she has tried to implement them. She is planning to follow up with a repeat EGD with her Gastroenterologist soon. She had surgery for esophageal  achalasia per Dr Rodriguez in the past.     She will follow up with cardiology for her diastolic dysfunction and MV stenosis/regurg. Her LE edema is minimal today.     She will follow up with Dr Evans as recommended post op. She has an appointment scheduled with Dr Tee, Radiation Oncology early January to discuss radiation options.     We had a long discussion regarding smoking abstinence. Three to 10 minutes was spent discussing the need for total smoking abstinence and smoking cessation strategies. She is not interested in Chantix d/t SE of friends. She does not want to attempt cessation until she is done with radiation because she feels she will be set up for failure d/t anxiety r/t treatment. She will call if she decides to quit and needs assistance.      She will follow up in the office with Dr Ottoniel Boyd in 6 months or earlier if needed.     Plan of care was reviewed with the patient at the conclusion of today's visit. Education was provided regarding diagnosis, management, and any prescribed or recommended over the counter medications. Patient verbalizes understanding of and agreement with management plan.     I spent 25 minutes with the patient. I spent > 50% percent of this time counseling and discussing diagnosis, prognosis, diagnostic testing, evaluation, current status, treatment options and management.    ERIKA Begum  Electronically signed     Please note that portions of this note were completed with a voice recognition program. Efforts were made to edit the dictations, but occasionally words are mistranscribed.    Addendum:  Called patient with HRCT and CT Chest results. There are some mild chronic peripheral interstitial changes. There is no hipolito fibrosis and there is no bronchiectasis.

## 2019-12-09 ENCOUNTER — READMISSION MANAGEMENT (OUTPATIENT)
Dept: CALL CENTER | Facility: HOSPITAL | Age: 69
End: 2019-12-09

## 2019-12-09 NOTE — OUTREACH NOTE
General Surgery Week 4 Survey      Responses   Facility patient discharged from?  [facility]   Does the patient have one of the following disease processes/diagnoses(primary or secondary)?  General Surgery   Week 4 attempt successful?  No          Adriana Baker RN

## 2020-01-01 ENCOUNTER — HOSPITAL ENCOUNTER (OUTPATIENT)
Dept: RADIATION ONCOLOGY | Facility: HOSPITAL | Age: 70
Setting detail: RADIATION/ONCOLOGY SERIES
Discharge: HOME | End: 2020-07-08

## 2020-01-01 ENCOUNTER — TELEPHONE (OUTPATIENT)
Dept: GYNECOLOGIC ONCOLOGY | Facility: CLINIC | Age: 70
End: 2020-01-01

## 2020-01-01 ENCOUNTER — CLINICAL SUPPORT (OUTPATIENT)
Dept: GYNECOLOGIC ONCOLOGY | Facility: CLINIC | Age: 70
End: 2020-01-01

## 2020-01-01 ENCOUNTER — OFFICE VISIT (OUTPATIENT)
Dept: GYNECOLOGIC ONCOLOGY | Facility: CLINIC | Age: 70
End: 2020-01-01

## 2020-01-01 ENCOUNTER — LAB (OUTPATIENT)
Dept: LAB | Facility: HOSPITAL | Age: 70
End: 2020-01-01

## 2020-01-01 ENCOUNTER — OFFICE VISIT (OUTPATIENT)
Dept: PULMONOLOGY | Facility: CLINIC | Age: 70
End: 2020-01-01

## 2020-01-01 ENCOUNTER — HOSPITAL ENCOUNTER (OUTPATIENT)
Dept: GENERAL RADIOLOGY | Facility: HOSPITAL | Age: 70
Discharge: HOME | End: 2020-11-16

## 2020-01-01 ENCOUNTER — TELEPHONE (OUTPATIENT)
Dept: PULMONOLOGY | Facility: CLINIC | Age: 70
End: 2020-01-01

## 2020-01-01 ENCOUNTER — DOCUMENTATION (OUTPATIENT)
Dept: NUTRITION | Facility: HOSPITAL | Age: 70
End: 2020-01-01

## 2020-01-01 ENCOUNTER — HOSPITAL ENCOUNTER (OUTPATIENT)
Dept: ONCOLOGY | Facility: HOSPITAL | Age: 70
Setting detail: INFUSION SERIES
Discharge: HOME | End: 2020-12-22

## 2020-01-01 ENCOUNTER — APPOINTMENT (OUTPATIENT)
Dept: PREADMISSION TESTING | Facility: HOSPITAL | Age: 70
End: 2020-01-01

## 2020-01-01 ENCOUNTER — TELEPHONE (OUTPATIENT)
Dept: INFUSION THERAPY | Facility: HOSPITAL | Age: 70
End: 2020-01-01

## 2020-01-01 ENCOUNTER — HOSPITAL ENCOUNTER (OUTPATIENT)
Dept: CT IMAGING | Facility: HOSPITAL | Age: 70
Discharge: HOME | End: 2020-11-16

## 2020-01-01 ENCOUNTER — PATIENT OUTREACH (OUTPATIENT)
Dept: CASE MANAGEMENT | Facility: OTHER | Age: 70
End: 2020-01-01

## 2020-01-01 ENCOUNTER — HOSPITAL ENCOUNTER (OUTPATIENT)
Dept: CT IMAGING | Facility: HOSPITAL | Age: 70
Discharge: HOME | End: 2020-10-30
Admitting: NURSE PRACTITIONER

## 2020-01-01 ENCOUNTER — OFFICE VISIT (OUTPATIENT)
Dept: RADIATION ONCOLOGY | Facility: HOSPITAL | Age: 70
End: 2020-01-01

## 2020-01-01 ENCOUNTER — DOCUMENTATION (OUTPATIENT)
Dept: SOCIAL WORK | Facility: HOSPITAL | Age: 70
End: 2020-01-01

## 2020-01-01 VITALS
BODY MASS INDEX: 24.63 KG/M2 | DIASTOLIC BLOOD PRESSURE: 70 MMHG | SYSTOLIC BLOOD PRESSURE: 130 MMHG | HEIGHT: 64 IN | HEART RATE: 89 BPM | TEMPERATURE: 98.4 F | RESPIRATION RATE: 18 BRPM | OXYGEN SATURATION: 94 % | WEIGHT: 144.25 LBS

## 2020-01-01 VITALS
TEMPERATURE: 97.3 F | RESPIRATION RATE: 16 BRPM | DIASTOLIC BLOOD PRESSURE: 49 MMHG | BODY MASS INDEX: 22.2 KG/M2 | HEART RATE: 64 BPM | WEIGHT: 130 LBS | HEIGHT: 64 IN | SYSTOLIC BLOOD PRESSURE: 133 MMHG

## 2020-01-01 VITALS
BODY MASS INDEX: 25.45 KG/M2 | HEART RATE: 77 BPM | HEIGHT: 64 IN | OXYGEN SATURATION: 94 % | WEIGHT: 149.1 LBS | SYSTOLIC BLOOD PRESSURE: 136 MMHG | DIASTOLIC BLOOD PRESSURE: 65 MMHG | RESPIRATION RATE: 19 BRPM | TEMPERATURE: 96.6 F

## 2020-01-01 VITALS
RESPIRATION RATE: 15 BRPM | SYSTOLIC BLOOD PRESSURE: 138 MMHG | WEIGHT: 137.4 LBS | TEMPERATURE: 98.2 F | BODY MASS INDEX: 23.46 KG/M2 | DIASTOLIC BLOOD PRESSURE: 67 MMHG | HEART RATE: 85 BPM | HEIGHT: 64 IN

## 2020-01-01 VITALS
WEIGHT: 141.8 LBS | SYSTOLIC BLOOD PRESSURE: 119 MMHG | BODY MASS INDEX: 24.21 KG/M2 | TEMPERATURE: 96.4 F | RESPIRATION RATE: 16 BRPM | HEIGHT: 64 IN | HEART RATE: 82 BPM | OXYGEN SATURATION: 92 % | DIASTOLIC BLOOD PRESSURE: 64 MMHG

## 2020-01-01 VITALS
WEIGHT: 170.2 LBS | DIASTOLIC BLOOD PRESSURE: 68 MMHG | HEART RATE: 82 BPM | BODY MASS INDEX: 29.06 KG/M2 | OXYGEN SATURATION: 96 % | HEIGHT: 64 IN | RESPIRATION RATE: 16 BRPM | TEMPERATURE: 98.1 F | SYSTOLIC BLOOD PRESSURE: 156 MMHG

## 2020-01-01 VITALS
TEMPERATURE: 97.5 F | BODY MASS INDEX: 23.57 KG/M2 | HEIGHT: 64 IN | HEART RATE: 95 BPM | SYSTOLIC BLOOD PRESSURE: 149 MMHG | DIASTOLIC BLOOD PRESSURE: 69 MMHG | RESPIRATION RATE: 18 BRPM

## 2020-01-01 DIAGNOSIS — J98.4 CAVITATING MASS IN UPPER LOBE OF LUNG: ICD-10-CM

## 2020-01-01 DIAGNOSIS — N39.46 MIXED STRESS AND URGE URINARY INCONTINENCE: ICD-10-CM

## 2020-01-01 DIAGNOSIS — C54.1 ENDOMETRIAL CANCER (HCC): Primary | ICD-10-CM

## 2020-01-01 DIAGNOSIS — R04.2 COUGH WITH HEMOPTYSIS: ICD-10-CM

## 2020-01-01 DIAGNOSIS — Z72.0 TOBACCO ABUSE: Chronic | ICD-10-CM

## 2020-01-01 DIAGNOSIS — C54.1 ENDOMETRIAL CANCER (HCC): Primary | Chronic | ICD-10-CM

## 2020-01-01 DIAGNOSIS — R06.09 DYSPNEA ON EXERTION: ICD-10-CM

## 2020-01-01 DIAGNOSIS — R63.4 UNEXPLAINED WEIGHT LOSS: ICD-10-CM

## 2020-01-01 DIAGNOSIS — R91.8 LUNG MASS: Primary | ICD-10-CM

## 2020-01-01 DIAGNOSIS — B37.2 CANDIDIASIS OF SKIN: ICD-10-CM

## 2020-01-01 DIAGNOSIS — C54.1 ENDOMETRIAL CANCER (HCC): Chronic | ICD-10-CM

## 2020-01-01 DIAGNOSIS — K21.9 CHRONIC GERD: ICD-10-CM

## 2020-01-01 DIAGNOSIS — F41.9 ANXIETY AND DEPRESSION: Chronic | ICD-10-CM

## 2020-01-01 DIAGNOSIS — C54.1 ENDOMETRIAL CANCER (HCC): ICD-10-CM

## 2020-01-01 DIAGNOSIS — T45.1X5A ALOPECIA DUE TO CYTOTOXIC DRUG: ICD-10-CM

## 2020-01-01 DIAGNOSIS — F43.20 DISTURBANCE IN EMOTION: ICD-10-CM

## 2020-01-01 DIAGNOSIS — R04.2 HEMOPTYSIS: ICD-10-CM

## 2020-01-01 DIAGNOSIS — E11.9 TYPE 2 DIABETES MELLITUS WITHOUT COMPLICATION, WITHOUT LONG-TERM CURRENT USE OF INSULIN (HCC): ICD-10-CM

## 2020-01-01 DIAGNOSIS — R91.8 MULTIPLE LUNG NODULES ON CT: ICD-10-CM

## 2020-01-01 DIAGNOSIS — L65.8 ALOPECIA DUE TO CYTOTOXIC DRUG: ICD-10-CM

## 2020-01-01 DIAGNOSIS — C78.00 MALIGNANT NEOPLASM METASTATIC TO LUNG, UNSPECIFIED LATERALITY (HCC): ICD-10-CM

## 2020-01-01 DIAGNOSIS — F32.A ANXIETY AND DEPRESSION: Chronic | ICD-10-CM

## 2020-01-01 DIAGNOSIS — R63.4 WEIGHT LOSS: ICD-10-CM

## 2020-01-01 DIAGNOSIS — J98.4 CAVITATING MASS IN UPPER LOBE OF LUNG: Primary | ICD-10-CM

## 2020-01-01 LAB
ALBUMIN SERPL-MCNC: 3.5 G/DL (ref 3.5–5.2)
ALBUMIN/GLOB SERPL: 0.9 G/DL
ALP SERPL-CCNC: 129 U/L (ref 39–117)
ALT SERPL W P-5'-P-CCNC: 10 U/L (ref 1–33)
ANION GAP SERPL CALCULATED.3IONS-SCNC: 12 MMOL/L (ref 5–15)
APTT PPP: 37.7 SECONDS (ref 24–37)
AST SERPL-CCNC: 20 U/L (ref 1–32)
BILIRUB SERPL-MCNC: 0.2 MG/DL (ref 0–1.2)
BUN SERPL-MCNC: 24 MG/DL (ref 8–23)
BUN/CREAT SERPL: 39.3 (ref 7–25)
CALCIUM SPEC-SCNC: 9.7 MG/DL (ref 8.6–10.5)
CHLORIDE SERPL-SCNC: 96 MMOL/L (ref 98–107)
CO2 SERPL-SCNC: 29 MMOL/L (ref 22–29)
CREAT BLDA-MCNC: 0.6 MG/DL (ref 0.6–1.3)
CREAT SERPL-MCNC: 0.61 MG/DL (ref 0.57–1)
CYTO UR: NORMAL
CYTO UR: NORMAL
ERYTHROCYTE [DISTWIDTH] IN BLOOD BY AUTOMATED COUNT: 18.3 % (ref 12.3–15.4)
GFR SERPL CREATININE-BSD FRML MDRD: 97 ML/MIN/1.73
GLOBULIN UR ELPH-MCNC: 3.8 GM/DL
GLUCOSE BLDC GLUCOMTR-MCNC: 123 MG/DL (ref 70–130)
GLUCOSE BLDC GLUCOMTR-MCNC: 163 MG/DL (ref 70–130)
GLUCOSE BLDC GLUCOMTR-MCNC: 216 MG/DL (ref 70–130)
GLUCOSE SERPL-MCNC: 117 MG/DL (ref 65–99)
HCT VFR BLD AUTO: 30.5 % (ref 34–46.6)
HGB BLD-MCNC: 9.6 G/DL (ref 12–15.9)
INR PPP: 0.99 (ref 0.85–1.16)
LAB AP CARIS, ADDENDUM: NORMAL
LAB AP CASE REPORT: NORMAL
LAB AP CASE REPORT: NORMAL
LAB AP CLINICAL INFORMATION: NORMAL
LAB AP CLINICAL INFORMATION: NORMAL
LAB AP DIAGNOSIS COMMENT: NORMAL
LAB AP INTEGRATED ONCOLOGY, ADDENDUM 2: NORMAL
LAB AP INTEGRATED ONCOLOGY, ADDENDUM: NORMAL
LYMPHOCYTES # BLD AUTO: 1.2 10*3/MM3 (ref 0.7–3.1)
LYMPHOCYTES NFR BLD AUTO: 10.6 % (ref 19.6–45.3)
Lab: NORMAL
MCH RBC QN AUTO: 26 PG (ref 26.6–33)
MCHC RBC AUTO-ENTMCNC: 31.3 G/DL (ref 31.5–35.7)
MCV RBC AUTO: 82.9 FL (ref 79–97)
MONOCYTES # BLD AUTO: 0.5 10*3/MM3 (ref 0.1–0.9)
MONOCYTES NFR BLD AUTO: 4.8 % (ref 5–12)
NEUTROPHILS NFR BLD AUTO: 84.6 % (ref 42.7–76)
NEUTROPHILS NFR BLD AUTO: 9.6 10*3/MM3 (ref 1.7–7)
PATH REPORT.FINAL DX SPEC: NORMAL
PATH REPORT.FINAL DX SPEC: NORMAL
PATH REPORT.GROSS SPEC: NORMAL
PATH REPORT.GROSS SPEC: NORMAL
PLATELET # BLD AUTO: 406 10*3/MM3 (ref 140–450)
PLATELET # BLD AUTO: 571 10*3/MM3 (ref 140–450)
PMV BLD AUTO: 5.8 FL (ref 6–12)
POTASSIUM SERPL-SCNC: 3.9 MMOL/L (ref 3.5–5.2)
PROT SERPL-MCNC: 7.3 G/DL (ref 6–8.5)
PROTHROMBIN TIME: 12.8 SECONDS (ref 11.5–14)
RBC # BLD AUTO: 3.68 10*6/MM3 (ref 3.77–5.28)
SARS-COV-2 RNA RESP QL NAA+PROBE: NOT DETECTED
SODIUM SERPL-SCNC: 137 MMOL/L (ref 136–145)
WBC # BLD AUTO: 11.4 10*3/MM3 (ref 3.4–10.8)

## 2020-01-01 PROCEDURE — 74177 CT ABD & PELVIS W/CONTRAST: CPT

## 2020-01-01 PROCEDURE — 71260 CT THORAX DX C+: CPT

## 2020-01-01 PROCEDURE — 71045 X-RAY EXAM CHEST 1 VIEW: CPT

## 2020-01-01 PROCEDURE — 94770: CPT

## 2020-01-01 PROCEDURE — 25010000002 CARBOPLATIN PER 50 MG: Performed by: OBSTETRICS & GYNECOLOGY

## 2020-01-01 PROCEDURE — 77012 CT SCAN FOR NEEDLE BIOPSY: CPT

## 2020-01-01 PROCEDURE — 82565 ASSAY OF CREATININE: CPT

## 2020-01-01 PROCEDURE — 25010000002 PALONOSETRON 0.25 MG/5ML SOLUTION PREFILLED SYRINGE: Performed by: OBSTETRICS & GYNECOLOGY

## 2020-01-01 PROCEDURE — 25010000002 KETOROLAC TROMETHAMINE PER 15 MG: Performed by: RADIOLOGY

## 2020-01-01 PROCEDURE — 25010000003 LIDOCAINE 1 % SOLUTION: Performed by: RADIOLOGY

## 2020-01-01 PROCEDURE — 82962 GLUCOSE BLOOD TEST: CPT

## 2020-01-01 PROCEDURE — 85049 AUTOMATED PLATELET COUNT: CPT | Performed by: RADIOLOGY

## 2020-01-01 PROCEDURE — 85730 THROMBOPLASTIN TIME PARTIAL: CPT | Performed by: RADIOLOGY

## 2020-01-01 PROCEDURE — 85025 COMPLETE CBC W/AUTO DIFF WBC: CPT

## 2020-01-01 PROCEDURE — 96367 TX/PROPH/DG ADDL SEQ IV INF: CPT

## 2020-01-01 PROCEDURE — 99215 OFFICE O/P EST HI 40 MIN: CPT | Performed by: OBSTETRICS & GYNECOLOGY

## 2020-01-01 PROCEDURE — 96372 THER/PROPH/DIAG INJ SC/IM: CPT

## 2020-01-01 PROCEDURE — 80053 COMPREHEN METABOLIC PANEL: CPT

## 2020-01-01 PROCEDURE — 85610 PROTHROMBIN TIME: CPT | Performed by: RADIOLOGY

## 2020-01-01 PROCEDURE — 25010000002 DIPHENHYDRAMINE PER 50 MG: Performed by: OBSTETRICS & GYNECOLOGY

## 2020-01-01 PROCEDURE — C9803 HOPD COVID-19 SPEC COLLECT: HCPCS

## 2020-01-01 PROCEDURE — 36415 COLL VENOUS BLD VENIPUNCTURE: CPT

## 2020-01-01 PROCEDURE — 99214 OFFICE O/P EST MOD 30 MIN: CPT | Performed by: INTERNAL MEDICINE

## 2020-01-01 PROCEDURE — 25010000002 FENTANYL CITRATE (PF) 100 MCG/2ML SOLUTION: Performed by: RADIOLOGY

## 2020-01-01 PROCEDURE — 96375 TX/PRO/DX INJ NEW DRUG ADDON: CPT

## 2020-01-01 PROCEDURE — 96417 CHEMO IV INFUS EACH ADDL SEQ: CPT

## 2020-01-01 PROCEDURE — 96413 CHEMO IV INFUSION 1 HR: CPT

## 2020-01-01 PROCEDURE — 25010000002 IOPAMIDOL 61 % SOLUTION: Performed by: NURSE PRACTITIONER

## 2020-01-01 PROCEDURE — 63710000001 INSULIN LISPRO (HUMAN) PER 5 UNITS: Performed by: OBSTETRICS & GYNECOLOGY

## 2020-01-01 PROCEDURE — 25010000002 FOSAPREPITANT PER 1 MG: Performed by: OBSTETRICS & GYNECOLOGY

## 2020-01-01 PROCEDURE — 25010000002 MIDAZOLAM PER 1 MG: Performed by: RADIOLOGY

## 2020-01-01 PROCEDURE — 96366 THER/PROPH/DIAG IV INF ADDON: CPT

## 2020-01-01 PROCEDURE — 25010000002 DEXAMETHASONE PER 1 MG: Performed by: OBSTETRICS & GYNECOLOGY

## 2020-01-01 PROCEDURE — 96415 CHEMO IV INFUSION ADDL HR: CPT

## 2020-01-01 PROCEDURE — G0463 HOSPITAL OUTPT CLINIC VISIT: HCPCS

## 2020-01-01 PROCEDURE — 88342 IMHCHEM/IMCYTCHM 1ST ANTB: CPT | Performed by: INTERNAL MEDICINE

## 2020-01-01 PROCEDURE — 99215 OFFICE O/P EST HI 40 MIN: CPT | Performed by: NURSE PRACTITIONER

## 2020-01-01 PROCEDURE — U0004 COV-19 TEST NON-CDC HGH THRU: HCPCS

## 2020-01-01 PROCEDURE — 88341 IMHCHEM/IMCYTCHM EA ADD ANTB: CPT | Performed by: INTERNAL MEDICINE

## 2020-01-01 PROCEDURE — 99152 MOD SED SAME PHYS/QHP 5/>YRS: CPT

## 2020-01-01 PROCEDURE — 88305 TISSUE EXAM BY PATHOLOGIST: CPT | Performed by: INTERNAL MEDICINE

## 2020-01-01 PROCEDURE — 25010000002 PACLITAXEL PER 30 MG: Performed by: OBSTETRICS & GYNECOLOGY

## 2020-01-01 RX ORDER — FENTANYL CITRATE 50 UG/ML
INJECTION, SOLUTION INTRAMUSCULAR; INTRAVENOUS
Status: DISPENSED
Start: 2020-01-01 | End: 2020-01-01

## 2020-01-01 RX ORDER — SIMVASTATIN 40 MG
40 TABLET ORAL DAILY
COMMUNITY
Start: 2020-01-01 | End: 2021-01-01

## 2020-01-01 RX ORDER — FENTANYL CITRATE 50 UG/ML
INJECTION, SOLUTION INTRAMUSCULAR; INTRAVENOUS
Status: COMPLETED | OUTPATIENT
Start: 2020-01-01 | End: 2020-01-01

## 2020-01-01 RX ORDER — SODIUM CHLORIDE 0.9 % (FLUSH) 0.9 %
3 SYRINGE (ML) INJECTION EVERY 12 HOURS SCHEDULED
Status: DISCONTINUED | OUTPATIENT
Start: 2020-01-01 | End: 2020-01-01 | Stop reason: HOSPADM

## 2020-01-01 RX ORDER — OXYBUTYNIN CHLORIDE 5 MG/1
5 TABLET, EXTENDED RELEASE ORAL DAILY
Qty: 30 TABLET | Refills: 11 | Status: SHIPPED | OUTPATIENT
Start: 2020-01-01

## 2020-01-01 RX ORDER — SODIUM CHLORIDE 9 MG/ML
250 INJECTION, SOLUTION INTRAVENOUS ONCE
Status: COMPLETED | OUTPATIENT
Start: 2020-01-01 | End: 2020-01-01

## 2020-01-01 RX ORDER — DEXAMETHASONE 4 MG/1
20 TABLET ORAL
Qty: 30 TABLET | Refills: 0 | Status: SHIPPED | OUTPATIENT
Start: 2020-01-01 | End: 2021-01-01 | Stop reason: HOSPADM

## 2020-01-01 RX ORDER — PALONOSETRON 0.05 MG/ML
0.25 INJECTION, SOLUTION INTRAVENOUS ONCE
Status: CANCELLED | OUTPATIENT
Start: 2020-01-01

## 2020-01-01 RX ORDER — SODIUM CHLORIDE 0.9 % (FLUSH) 0.9 %
10 SYRINGE (ML) INJECTION AS NEEDED
Status: DISCONTINUED | OUTPATIENT
Start: 2020-01-01 | End: 2020-01-01 | Stop reason: HOSPADM

## 2020-01-01 RX ORDER — ONDANSETRON HYDROCHLORIDE 8 MG/1
8 TABLET, FILM COATED ORAL 3 TIMES DAILY PRN
Qty: 30 TABLET | Refills: 5 | Status: SHIPPED | OUTPATIENT
Start: 2020-01-01

## 2020-01-01 RX ORDER — MIDAZOLAM HYDROCHLORIDE 1 MG/ML
INJECTION INTRAMUSCULAR; INTRAVENOUS
Status: COMPLETED | OUTPATIENT
Start: 2020-01-01 | End: 2020-01-01

## 2020-01-01 RX ORDER — PALONOSETRON 0.05 MG/ML
0.25 INJECTION, SOLUTION INTRAVENOUS ONCE
Status: COMPLETED | OUTPATIENT
Start: 2020-01-01 | End: 2020-01-01

## 2020-01-01 RX ORDER — PROMETHAZINE HYDROCHLORIDE 25 MG/1
25 TABLET ORAL EVERY 6 HOURS PRN
Qty: 30 TABLET | Refills: 5 | Status: SHIPPED | OUTPATIENT
Start: 2020-01-01

## 2020-01-01 RX ORDER — FAMOTIDINE 10 MG/ML
20 INJECTION, SOLUTION INTRAVENOUS ONCE
Status: COMPLETED | OUTPATIENT
Start: 2020-01-01 | End: 2020-01-01

## 2020-01-01 RX ORDER — MIDAZOLAM HYDROCHLORIDE 1 MG/ML
INJECTION INTRAMUSCULAR; INTRAVENOUS
Status: DISPENSED
Start: 2020-01-01 | End: 2020-01-01

## 2020-01-01 RX ORDER — FAMOTIDINE 10 MG/ML
20 INJECTION, SOLUTION INTRAVENOUS AS NEEDED
Status: CANCELLED | OUTPATIENT
Start: 2020-01-01

## 2020-01-01 RX ORDER — KETOROLAC TROMETHAMINE 30 MG/ML
15 INJECTION, SOLUTION INTRAMUSCULAR; INTRAVENOUS ONCE AS NEEDED
Status: COMPLETED | OUTPATIENT
Start: 2020-01-01 | End: 2020-01-01

## 2020-01-01 RX ORDER — ESOMEPRAZOLE MAGNESIUM 40 MG/1
40 CAPSULE, DELAYED RELEASE ORAL
Qty: 30 CAPSULE | Refills: 3 | Status: ON HOLD | OUTPATIENT
Start: 2020-01-01 | End: 2021-01-01 | Stop reason: SDUPTHER

## 2020-01-01 RX ORDER — DEXAMETHASONE 4 MG/1
TABLET ORAL
Qty: 30 TABLET | Refills: 0 | Status: CANCELLED | OUTPATIENT
Start: 2020-01-01

## 2020-01-01 RX ORDER — FAMOTIDINE 10 MG/ML
20 INJECTION, SOLUTION INTRAVENOUS ONCE
Status: CANCELLED | OUTPATIENT
Start: 2020-01-01

## 2020-01-01 RX ORDER — DIPHENHYDRAMINE HYDROCHLORIDE 50 MG/ML
50 INJECTION INTRAMUSCULAR; INTRAVENOUS AS NEEDED
Status: CANCELLED | OUTPATIENT
Start: 2020-01-01

## 2020-01-01 RX ORDER — LIDOCAINE HYDROCHLORIDE 10 MG/ML
20 INJECTION, SOLUTION INFILTRATION; PERINEURAL ONCE
Status: COMPLETED | OUTPATIENT
Start: 2020-01-01 | End: 2020-01-01

## 2020-01-01 RX ORDER — DEXAMETHASONE 4 MG/1
TABLET ORAL
Qty: 6 TABLET | Refills: 5 | Status: SHIPPED | OUTPATIENT
Start: 2020-01-01 | End: 2021-01-01 | Stop reason: HOSPADM

## 2020-01-01 RX ORDER — SODIUM CHLORIDE 9 MG/ML
250 INJECTION, SOLUTION INTRAVENOUS ONCE
Status: CANCELLED | OUTPATIENT
Start: 2020-01-01

## 2020-01-01 RX ORDER — ESOMEPRAZOLE MAGNESIUM 40 MG/1
40 CAPSULE, DELAYED RELEASE ORAL
Qty: 30 CAPSULE | Refills: 3 | Status: CANCELLED | OUTPATIENT
Start: 2020-01-01

## 2020-01-01 RX ORDER — LORATADINE 10 MG/1
10 TABLET ORAL DAILY
Qty: 30 TABLET | Refills: 3 | Status: SHIPPED | OUTPATIENT
Start: 2020-01-01

## 2020-01-01 RX ORDER — NYSTATIN 100000 [USP'U]/G
POWDER TOPICAL 3 TIMES DAILY
Qty: 30 G | Refills: 1 | Status: SHIPPED | OUTPATIENT
Start: 2020-01-01

## 2020-01-01 RX ADMIN — SODIUM CHLORIDE 150 MG: 9 INJECTION, SOLUTION INTRAVENOUS at 10:00

## 2020-01-01 RX ADMIN — MIDAZOLAM 1 MG: 1 INJECTION INTRAMUSCULAR; INTRAVENOUS at 12:09

## 2020-01-01 RX ADMIN — LIDOCAINE HYDROCHLORIDE 20 ML: 10 INJECTION, SOLUTION INFILTRATION; PERINEURAL at 12:30

## 2020-01-01 RX ADMIN — KETOROLAC TROMETHAMINE 15 MG: 30 INJECTION, SOLUTION INTRAMUSCULAR; INTRAVENOUS at 12:50

## 2020-01-01 RX ADMIN — DIPHENHYDRAMINE HYDROCHLORIDE 50 MG: 50 INJECTION INTRAMUSCULAR; INTRAVENOUS at 10:29

## 2020-01-01 RX ADMIN — FAMOTIDINE 20 MG: 10 INJECTION, SOLUTION INTRAVENOUS at 10:28

## 2020-01-01 RX ADMIN — FENTANYL CITRATE 50 MCG: 50 INJECTION, SOLUTION INTRAMUSCULAR; INTRAVENOUS at 12:09

## 2020-01-01 RX ADMIN — PALONOSETRON HYDROCHLORIDE 0.25 MG: 0.05 INJECTION, SOLUTION INTRAVENOUS at 10:29

## 2020-01-01 RX ADMIN — FENTANYL CITRATE 50 MCG: 50 INJECTION, SOLUTION INTRAMUSCULAR; INTRAVENOUS at 12:07

## 2020-01-01 RX ADMIN — INSULIN LISPRO 6 UNITS: 100 INJECTION, SOLUTION INTRAVENOUS; SUBCUTANEOUS at 10:01

## 2020-01-01 RX ADMIN — CARBOPLATIN 560 MG: 10 INJECTION, SOLUTION INTRAVENOUS at 13:56

## 2020-01-01 RX ADMIN — DEXAMETHASONE SODIUM PHOSPHATE 20 MG: 4 INJECTION, SOLUTION INTRAMUSCULAR; INTRAVENOUS at 10:28

## 2020-01-01 RX ADMIN — MIDAZOLAM 1 MG: 1 INJECTION INTRAMUSCULAR; INTRAVENOUS at 12:07

## 2020-01-01 RX ADMIN — SODIUM CHLORIDE 250 ML: 9 INJECTION, SOLUTION INTRAVENOUS at 10:00

## 2020-01-01 RX ADMIN — SODIUM CHLORIDE 290 MG: 9 INJECTION, SOLUTION INTRAVENOUS at 10:46

## 2020-01-01 RX ADMIN — IOPAMIDOL 95 ML: 612 INJECTION, SOLUTION INTRAVENOUS at 13:12

## 2020-01-06 ENCOUNTER — HOSPITAL ENCOUNTER (OUTPATIENT)
Dept: RADIATION ONCOLOGY | Facility: HOSPITAL | Age: 70
Setting detail: RADIATION/ONCOLOGY SERIES
Discharge: HOME | End: 2020-01-06

## 2020-01-06 ENCOUNTER — OFFICE VISIT (OUTPATIENT)
Dept: RADIATION ONCOLOGY | Facility: HOSPITAL | Age: 70
End: 2020-01-06

## 2020-01-06 VITALS
DIASTOLIC BLOOD PRESSURE: 79 MMHG | HEART RATE: 72 BPM | BODY MASS INDEX: 29.76 KG/M2 | SYSTOLIC BLOOD PRESSURE: 196 MMHG | RESPIRATION RATE: 18 BRPM | WEIGHT: 174.3 LBS | TEMPERATURE: 98.2 F | OXYGEN SATURATION: 95 % | HEIGHT: 64 IN

## 2020-01-06 DIAGNOSIS — C54.1 ENDOMETRIAL CANCER (HCC): Primary | ICD-10-CM

## 2020-01-06 PROCEDURE — G0463 HOSPITAL OUTPT CLINIC VISIT: HCPCS

## 2020-01-06 NOTE — PROGRESS NOTES
CONSULTATION NOTE    NAME:      Ele Strickland  :                                                          1950  DATE OF CONSULTATION:                       20  REQUESTING PHYSICIAN:                   Zuly Evans MD  REASON FOR CONSULTATION:           Cancer Staging  Endometrial cancer (CMS/HCC)  Staging form: Corpus Uteri - Carcinoma And Carcinosarcoma, AJCC 8th Edition  - Clinical stage from 2019: FIGO Stage I (cT1, cN0, cM0)   - Pathologic stage from 11/15/2019: FIGO Stage II (pT2, pN0, cM0)          BRIEF HISTORY:  Ele Strickland  is a very pleasant 69 y.o. female  who developed postmenopausal bleeding and biopsy was positive for intermediate grade endometrioid adenocarcinoma of the uterus.  She underwent surgery by Dr. Evans and was found to have invasive endometrioid adenocarcinoma, FIGO grade 2.  The tumor measured 9.5 x 6 x 2.5 cm and invaded greater than half the myometrium to within 1.5 mm of the serosal surface.  The percentage of myometrial invasion was 91%.  Tumor extended and to the endocervix and invaded into the cervical stroma.  The tubes and ovaries were negative.  0/6 pelvic lymph nodes were positive for tumor.  She has recovered well from surgery and is here to discuss postoperative radiation.  Dr. Evans has talked to her about brachii therapy plus or minus external beam radiation to the pelvis.  Dr. Hou is managing left-sided pelvic pain with tingling of the nerves.  She has fatigue and diarrhea.  Allergies   Allergen Reactions   • Augmentin [Amoxicillin-Pot Clavulanate] GI Intolerance   • Biaxin [Clarithromycin] Hallucinations       Social History     Tobacco Use   • Smoking status: Current Every Day Smoker     Packs/day: 1.00     Years: 30.00     Pack years: 30.00     Types: Cigarettes   • Smokeless tobacco: Never Used   Substance Use Topics   • Alcohol use: No     Frequency: Never   • Drug use: No         Past Medical History:   Diagnosis Date   • Anxiety and  "depression    • Cancer (CMS/HCC)    • Chronic back pain    • Chronic bronchitis (CMS/HCC)    • COPD (chronic obstructive pulmonary disease) (CMS/HCC)     no home O2   • Diabetes (CMS/HCC)    • Frequent urination    • Gall stones    • GERD (gastroesophageal reflux disease)    • Heart murmur    • High blood pressure    • Pinched nerve     back   • Wears dentures    • Wears glasses        family history includes Colon cancer in her cousin and paternal aunt; Colon cancer (age of onset: 50) in her half-sister; Heart attack in her brother and mother; Hypertension in her daughter and son; Lung cancer in her brother; Uterine cancer (age of onset: 84) in her mother.     Past Surgical History:   Procedure Laterality Date   • ENDOSCOPY     • HELLER MYOTOMY LAPAROSCOPIC WITH ENDOSCOPY  2012   • TOTAL LAPAROSCOPIC HYSTERECTOMY SALPINGO OOPHORECTOMY Bilateral 11/15/2019    Procedure: TYPE 1 RADICAL TOTAL LAPAROSCOPIC HYSTERECTOMY BILATERAL SALPINGOOPHORECTOMY, LYSIS OF ADHESTIONS, BILATERAL PELVIC LYMPH NODE DISSECTION WITH DAVINCI ROBOT;  Surgeon: Zuly Evans MD;  Location: [facility];  Service: [facility]        Review of Systems   Constitutional: Positive for fatigue.   Gastrointestinal: Positive for diarrhea.   Neurological: Positive for extremity weakness (left sided pain and tingling from pinched nerve.  Dr. Hou managing.).   All other systems reviewed and are negative.          Objective   VITAL SIGNS:   Vitals:    01/06/20 0930   BP: (!) 196/79   Pulse: 72   Resp: 18   Temp: 98.2 °F (36.8 °C)   TempSrc: Temporal   SpO2: 95%  Comment: RA   Weight: 79.1 kg (174 lb 4.8 oz)   Height: 162.6 cm (64.02\")   PainSc:   7   PainLoc: Back        KPS       80%    Physical Exam   Constitutional: She is oriented to person, place, and time. She appears well-developed and well-nourished. No distress.   HENT:   Head: Normocephalic and atraumatic.   Eyes: EOM are normal. No scleral icterus.   Neck: Neck supple.   Cardiovascular: Normal " rate and regular rhythm.   Pulmonary/Chest: Effort normal and breath sounds normal.   Lymphadenopathy:     She has no cervical adenopathy.   Neurological: She is alert and oriented to person, place, and time.   Nursing note and vitals reviewed.  The pelvic exam reveals normal external genitalia.  Bimanual exam reveals no suspicious masses or lesions in the vagina and the speculum was inserted without difficulty.  The apex of the vagina is well-healing.         The following portions of the patient's history were reviewed and updated as appropriate: allergies, current medications, past family history, past medical history, past social history, past surgical history and problem list.    Assessment      IMPRESSION:  Ele Strickland  is a 69 y.o. female  who underwent hysterectomy, bilateral salpingo-oophorectomy and lymph node dissection by Dr. Evans and was found to have invasive endometrioid adenocarcinoma, FIGO grade 2.  The tumor measured 9.5 x 6 x 2.5 cm and invaded greater than half the myometrium to within 1.5 mm of the serosal surface.  The percentage of myometrial invasion was 91%.  Tumor extended into the endocervix and invaded into the cervical stroma.  The tubes and ovaries were negative.  0/6 pelvic lymph nodes were positive for tumor.  She has recovered well from surgery and is here to discuss postoperative radiation.   Dr. Hou is managing left-sided pelvic pain with tingling of the nerves.  She has fatigue and diarrhea.      RECOMMENDATIONS: I discussed external beam radiation plus or minus vaginal brachytherapy as recommended in the NCCN guidelines.  She understands there is no survival benefit but this is for local control.  She wants to undergo both forms of therapy.  The pros and cons, risks and benefits of treatment were discussed at length and informed consent obtained.  She has never had a colonoscopy so we will have her undergo colonoscopy prior to the start of therapy.  We will have her  return for treatment planning.  We will give 45 Thapa to the pelvis and 12 Gy in 2 fractions utilizing iridium 192 and a vaginal cylinder to boost the apex of the vagina.  Thank you very much for asking me to see Mrs. Strickland.         Avis Tee MD      Errors in dictation may reflect use of voice recognition software and not all errors in transcription may have been detected prior to signing.

## 2020-01-07 ENCOUNTER — HOSPITAL ENCOUNTER (OUTPATIENT)
Dept: RADIATION ONCOLOGY | Facility: HOSPITAL | Age: 70
Discharge: HOME | End: 2020-01-07

## 2020-01-08 DIAGNOSIS — C54.1 ENDOMETRIAL CANCER (HCC): Primary | Chronic | ICD-10-CM

## 2020-01-10 ENCOUNTER — TELEPHONE (OUTPATIENT)
Dept: RADIATION ONCOLOGY | Facility: HOSPITAL | Age: 70
End: 2020-01-10

## 2020-01-10 NOTE — TELEPHONE ENCOUNTER
I moved patient's appointment with Dr. Sethi to a sooner appointment, in order to start radiation sooner.  I called and left a VM for patient about change of appointment and emailed patient as well.

## 2020-01-14 DIAGNOSIS — Z12.11 SCREENING FOR COLON CANCER: Primary | ICD-10-CM

## 2020-01-16 ENCOUNTER — HOSPITAL ENCOUNTER (OUTPATIENT)
Dept: RADIATION ONCOLOGY | Facility: HOSPITAL | Age: 70
Discharge: HOME | End: 2020-01-16

## 2020-01-16 PROCEDURE — 77280 THER RAD SIMULAJ FIELD SMPL: CPT | Performed by: RADIOLOGY

## 2020-01-20 PROCEDURE — 77338 DESIGN MLC DEVICE FOR IMRT: CPT | Performed by: RADIOLOGY

## 2020-01-20 PROCEDURE — 77300 RADIATION THERAPY DOSE PLAN: CPT | Performed by: RADIOLOGY

## 2020-01-20 PROCEDURE — 77301 RADIOTHERAPY DOSE PLAN IMRT: CPT | Performed by: RADIOLOGY

## 2020-01-21 ENCOUNTER — LAB REQUISITION (OUTPATIENT)
Dept: LAB | Facility: HOSPITAL | Age: 70
End: 2020-01-21

## 2020-01-21 ENCOUNTER — OUTSIDE FACILITY SERVICE (OUTPATIENT)
Dept: GASTROENTEROLOGY | Facility: CLINIC | Age: 70
End: 2020-01-21

## 2020-01-21 DIAGNOSIS — K12.0 RECURRENT ORAL APHTHAE: ICD-10-CM

## 2020-01-21 DIAGNOSIS — Z12.11 ENCOUNTER FOR SCREENING FOR MALIGNANT NEOPLASM OF COLON: ICD-10-CM

## 2020-01-21 PROCEDURE — 43239 EGD BIOPSY SINGLE/MULTIPLE: CPT | Performed by: INTERNAL MEDICINE

## 2020-01-21 PROCEDURE — 88305 TISSUE EXAM BY PATHOLOGIST: CPT | Performed by: INTERNAL MEDICINE

## 2020-01-21 PROCEDURE — 45385 COLONOSCOPY W/LESION REMOVAL: CPT | Performed by: INTERNAL MEDICINE

## 2020-01-22 LAB
CYTO UR: NORMAL
LAB AP CASE REPORT: NORMAL
LAB AP CLINICAL INFORMATION: NORMAL
PATH REPORT.FINAL DX SPEC: NORMAL
PATH REPORT.GROSS SPEC: NORMAL

## 2020-01-24 ENCOUNTER — HOSPITAL ENCOUNTER (OUTPATIENT)
Dept: RADIATION ONCOLOGY | Facility: HOSPITAL | Age: 70
Discharge: HOME | End: 2020-01-24

## 2020-01-27 ENCOUNTER — HOSPITAL ENCOUNTER (OUTPATIENT)
Dept: RADIATION ONCOLOGY | Facility: HOSPITAL | Age: 70
Discharge: HOME | End: 2020-01-27

## 2020-01-27 PROCEDURE — 77386: CPT | Performed by: RADIOLOGY

## 2020-01-28 ENCOUNTER — HOSPITAL ENCOUNTER (OUTPATIENT)
Dept: RADIATION ONCOLOGY | Facility: HOSPITAL | Age: 70
Discharge: HOME | End: 2020-01-28

## 2020-01-28 ENCOUNTER — DOCUMENTATION (OUTPATIENT)
Dept: NUTRITION | Facility: HOSPITAL | Age: 70
End: 2020-01-28

## 2020-01-28 VITALS — WEIGHT: 172.9 LBS | BODY MASS INDEX: 29.66 KG/M2

## 2020-01-28 PROCEDURE — 77386: CPT | Performed by: RADIOLOGY

## 2020-01-28 NOTE — PROGRESS NOTES
Oncology Nutrition Screening    Patient Name:  Ele Strickland  YOB: 1950  MRN: [id number]  Date:  01/28/20  Physician:       Type of Cancer Treatment:   Surgery: Hysterectomy, bilateral salpingo-oophorectomy and lymph node dissection  Radiation: 45 Gray to the pelvis and 12 Gy in 2 fractions utilizing iridium 192 and a vaginal cylinder to boost the apex of the vagina    Patient Active Problem List   Diagnosis   • Atypical glandular cells of undetermined significance (RUDDY) on cervical Pap smear   • Thickened endometrium   • Heart murmur   • Dyspnea on exertion   • Cardiomegaly   • Lower extremity edema   • Tobacco abuse   • Chronic GERD   • Chronic obstructive lung disease (CMS/HCC)   • Essential hypertension   • Neuropathy   • Nicotine dependence   • Systolic murmur   • Type 2 diabetes mellitus without complication (CMS/HCC)   • Achalasia of esophagus   • Pre-op examination   • Endometrial cancer (CMS/HCC)   • Anxiety and depression   Pathological stage II   / 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     • aspirin 81 MG EC tablet Take 81 mg by mouth Daily.     • Aspirin-Acetaminophen-Caffeine (EXCEDRIN PO) Take  by mouth.     • atorvastatin (LIPITOR) 40 MG tablet Take 40 mg by mouth every night at bedtime.  1   • DULoxetine (CYMBALTA) 60 MG capsule TAKE 1 CAPSULE BY MOUTH ONCE DAILY FOR 90 DAYS  1   • gabapentin (NEURONTIN) 300 MG capsule Take 300 mg by mouth 3 (Three) Times a Day.  0   • ibuprofen (ADVIL,MOTRIN) 200 MG tablet Take 200 mg by mouth 2 (Two) Times a Day As Needed for Mild Pain .     • meloxicam (MOBIC) 7.5 MG tablet Take 1 tablet by mouth As Needed.     • metFORMIN (GLUCOPHAGE) 1000  MG tablet Take 1,000 mg by mouth 2 (Two) Times a Day.  1   • oxyCODONE (ROXICODONE) 5 MG immediate release tablet Take 1 tablet by mouth Every 4-6 Hours As Needed for Moderate Pain . 10 tablet 0   • Sod Picosulfate-Mag Ox-Cit Acd 10-3.5-12 MG-GM -GM/160ML solution Take 1 kit by mouth Take As Directed. Follow instructions that were mailed to your home. If you didn't receive these call [phone]. 2 bottle 0   • Tiotropium Bromide-Olodaterol (STIOLTO RESPIMAT IN) Inhale 2 puffs Daily.     • triamterene-hydrochlorothiazide (DYAZIDE) 37.5-25 MG per capsule Take 1 capsule by mouth Every Morning.  1     No current facility-administered medications for this visit.        Glycemic Risk:   NIDDM    Weight:   Height 64 inches Weight: 174.2 lbs.     BMI: 29.9  Overweight  Weight gain of 9 lbs recently    Oral Food Intake:  Regular Diet - No Restrictions    Hydration Status:   How many 8 ounce glass of water of fluid do you drink per day?  Inadequate for goal of 64-85 ounces per day    Enteral Feeding:   NA    Nutrition Symptoms:   No Problems with Eating  Diarrhea   Fatigue    Activity:   Not my normal self, but able to be up and about with fairly normal activities     reports that she has been smoking cigarettes. She has a 30.00 pack-year smoking history. She has never used smokeless tobacco. She reports that she does not drink alcohol or use drugs.    Evaluation of Nutritional Risk:   Patient is not identified at nutritional risk at this time.  She lives alone and is responsible for procurement and preparation of her meals and snacks.  She works at the [employer].  She complains of pain from her pinched nerve and fatigue.  She states that she has loose stools, but not necessarily diarrhea or increased frequency. She states that her weight was 164 lbs / she has gained to present weight of 172.9 lbs.    She is drinking one Ensure supplement per day; encouraged her to increase to 2-3 per day and provided tips for flavor and  nutrient enhancement   Provided her with optional choices for supplements that are more cost effective.  Discussed the importance of good nutrition and focus on adequate calories, protein, adequate hydration / provided suggestions for nutrient dense foods that are pre-prepared and easy to keep on hand to sustain good nutritional intake.  Encouraged grazing for improved nutrition and also management of diabetes.    Patient does not check her blood sugars; strongly encouraged her to check several times during the week as treatment may cause an increase blood sugars.  She is maintained on Metformin which she states is a contributing factor to her looser stools.    Discussed possible side effects of radiation that she may experience and tips for management as she progresses through treatment.    Will follow closely.          Electronically signed by:  Nikki Magallanes RD  8:45 AM

## 2020-01-29 ENCOUNTER — HOSPITAL ENCOUNTER (OUTPATIENT)
Dept: RADIATION ONCOLOGY | Facility: HOSPITAL | Age: 70
Discharge: HOME | End: 2020-01-29

## 2020-01-29 PROCEDURE — 77386: CPT | Performed by: RADIOLOGY

## 2020-01-30 ENCOUNTER — HOSPITAL ENCOUNTER (OUTPATIENT)
Dept: RADIATION ONCOLOGY | Facility: HOSPITAL | Age: 70
Discharge: HOME | End: 2020-01-30

## 2020-01-30 PROCEDURE — 77386: CPT | Performed by: RADIOLOGY

## 2020-01-31 ENCOUNTER — HOSPITAL ENCOUNTER (OUTPATIENT)
Dept: RADIATION ONCOLOGY | Facility: HOSPITAL | Age: 70
Discharge: HOME | End: 2020-01-31

## 2020-01-31 PROCEDURE — 77386: CPT | Performed by: RADIOLOGY

## 2020-01-31 PROCEDURE — 77336 RADIATION PHYSICS CONSULT: CPT | Performed by: RADIOLOGY

## 2020-02-03 ENCOUNTER — HOSPITAL ENCOUNTER (OUTPATIENT)
Dept: RADIATION ONCOLOGY | Facility: HOSPITAL | Age: 70
Setting detail: RADIATION/ONCOLOGY SERIES
Discharge: HOME | End: 2020-02-03

## 2020-02-03 ENCOUNTER — HOSPITAL ENCOUNTER (OUTPATIENT)
Dept: RADIATION ONCOLOGY | Facility: HOSPITAL | Age: 70
Discharge: HOME | End: 2020-02-03

## 2020-02-03 PROCEDURE — 77386: CPT | Performed by: RADIOLOGY

## 2020-02-04 ENCOUNTER — HOSPITAL ENCOUNTER (OUTPATIENT)
Dept: RADIATION ONCOLOGY | Facility: HOSPITAL | Age: 70
Discharge: HOME | End: 2020-02-04

## 2020-02-04 VITALS — WEIGHT: 168.3 LBS | BODY MASS INDEX: 28.87 KG/M2

## 2020-02-04 PROCEDURE — 77386: CPT | Performed by: RADIOLOGY

## 2020-02-05 ENCOUNTER — HOSPITAL ENCOUNTER (OUTPATIENT)
Dept: RADIATION ONCOLOGY | Facility: HOSPITAL | Age: 70
Discharge: HOME | End: 2020-02-05

## 2020-02-05 PROCEDURE — 77386: CPT | Performed by: RADIOLOGY

## 2020-02-06 ENCOUNTER — HOSPITAL ENCOUNTER (OUTPATIENT)
Dept: RADIATION ONCOLOGY | Facility: HOSPITAL | Age: 70
Discharge: HOME | End: 2020-02-06

## 2020-02-06 PROCEDURE — 77386: CPT | Performed by: RADIOLOGY

## 2020-02-06 PROCEDURE — 77336 RADIATION PHYSICS CONSULT: CPT | Performed by: RADIOLOGY

## 2020-02-07 ENCOUNTER — HOSPITAL ENCOUNTER (OUTPATIENT)
Dept: RADIATION ONCOLOGY | Facility: HOSPITAL | Age: 70
Discharge: HOME | End: 2020-02-07

## 2020-02-10 ENCOUNTER — HOSPITAL ENCOUNTER (OUTPATIENT)
Dept: RADIATION ONCOLOGY | Facility: HOSPITAL | Age: 70
Discharge: HOME | End: 2020-02-10

## 2020-02-10 PROCEDURE — 77386: CPT | Performed by: RADIOLOGY

## 2020-02-11 ENCOUNTER — HOSPITAL ENCOUNTER (OUTPATIENT)
Dept: RADIATION ONCOLOGY | Facility: HOSPITAL | Age: 70
Discharge: HOME | End: 2020-02-11

## 2020-02-11 VITALS — WEIGHT: 168.6 LBS | BODY MASS INDEX: 28.93 KG/M2

## 2020-02-11 PROCEDURE — 77386: CPT | Performed by: RADIOLOGY

## 2020-02-12 ENCOUNTER — HOSPITAL ENCOUNTER (OUTPATIENT)
Dept: RADIATION ONCOLOGY | Facility: HOSPITAL | Age: 70
Discharge: HOME | End: 2020-02-12

## 2020-02-12 PROCEDURE — 77386: CPT | Performed by: RADIOLOGY

## 2020-02-13 ENCOUNTER — DOCUMENTATION (OUTPATIENT)
Dept: NUTRITION | Facility: HOSPITAL | Age: 70
End: 2020-02-13

## 2020-02-13 ENCOUNTER — HOSPITAL ENCOUNTER (OUTPATIENT)
Dept: RADIATION ONCOLOGY | Facility: HOSPITAL | Age: 70
Discharge: HOME | End: 2020-02-13

## 2020-02-13 PROCEDURE — 77336 RADIATION PHYSICS CONSULT: CPT | Performed by: RADIOLOGY

## 2020-02-13 PROCEDURE — 77386: CPT | Performed by: RADIOLOGY

## 2020-02-13 NOTE — PROGRESS NOTES
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  She continues to work full time and shares that she is becoming very fatigued with progression of treatment. She began experiencing diarrhea over the weekend, but is now managed.  Discussed tips for management of the diarrhea including modification of textures and avoidance of the indigestible fibers, anti diarrheal meds, eating smaller more frequent meals and snacks vs large meals, increased protein intake, adequate hydration and management of blood sugars.  Patient states that her work interferes with her trying to eat a healthier diet with smaller, more frequent meals and snacks during the day, so she generally will only eat 2 meals per day.  Discussed nutritional supplements to support nutritional intake.  Will continue to follow.

## 2020-02-14 ENCOUNTER — HOSPITAL ENCOUNTER (OUTPATIENT)
Dept: RADIATION ONCOLOGY | Facility: HOSPITAL | Age: 70
Discharge: HOME | End: 2020-02-14

## 2020-02-14 PROCEDURE — 77386: CPT | Performed by: RADIOLOGY

## 2020-02-17 ENCOUNTER — HOSPITAL ENCOUNTER (OUTPATIENT)
Dept: RADIATION ONCOLOGY | Facility: HOSPITAL | Age: 70
Discharge: HOME | End: 2020-02-17

## 2020-02-17 PROCEDURE — 77386: CPT | Performed by: RADIOLOGY

## 2020-02-18 ENCOUNTER — HOSPITAL ENCOUNTER (OUTPATIENT)
Dept: RADIATION ONCOLOGY | Facility: HOSPITAL | Age: 70
Discharge: HOME | End: 2020-02-18

## 2020-02-18 VITALS — WEIGHT: 166.2 LBS | BODY MASS INDEX: 28.51 KG/M2

## 2020-02-18 PROCEDURE — 77386: CPT | Performed by: RADIOLOGY

## 2020-02-19 ENCOUNTER — DOCUMENTATION (OUTPATIENT)
Dept: NUTRITION | Facility: HOSPITAL | Age: 70
End: 2020-02-19

## 2020-02-19 ENCOUNTER — HOSPITAL ENCOUNTER (OUTPATIENT)
Dept: RADIATION ONCOLOGY | Facility: HOSPITAL | Age: 70
Discharge: HOME | End: 2020-02-19

## 2020-02-19 PROCEDURE — 77386: CPT | Performed by: RADIOLOGY

## 2020-02-19 NOTE — PROGRESS NOTES
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  She continues to struggle with trying to maximize nutritional intake because of her work schedule.  Again stressed the importance of healthier food choices and focus on increasing protein intake to combat the fatigue she is feeling.  Will continue to follow.

## 2020-02-20 ENCOUNTER — HOSPITAL ENCOUNTER (OUTPATIENT)
Dept: RADIATION ONCOLOGY | Facility: HOSPITAL | Age: 70
Discharge: HOME | End: 2020-02-20

## 2020-02-20 PROCEDURE — 77386: CPT | Performed by: RADIOLOGY

## 2020-02-20 PROCEDURE — 77336 RADIATION PHYSICS CONSULT: CPT | Performed by: RADIOLOGY

## 2020-02-21 ENCOUNTER — HOSPITAL ENCOUNTER (OUTPATIENT)
Dept: RADIATION ONCOLOGY | Facility: HOSPITAL | Age: 70
Discharge: HOME | End: 2020-02-21

## 2020-02-21 PROCEDURE — 77386: CPT | Performed by: RADIOLOGY

## 2020-02-24 ENCOUNTER — HOSPITAL ENCOUNTER (OUTPATIENT)
Dept: RADIATION ONCOLOGY | Facility: HOSPITAL | Age: 70
Discharge: HOME | End: 2020-02-24

## 2020-02-24 PROCEDURE — 77386: CPT | Performed by: RADIOLOGY

## 2020-02-25 ENCOUNTER — HOSPITAL ENCOUNTER (OUTPATIENT)
Dept: RADIATION ONCOLOGY | Facility: HOSPITAL | Age: 70
Discharge: HOME | End: 2020-02-25

## 2020-02-25 ENCOUNTER — LAB (OUTPATIENT)
Dept: LAB | Facility: HOSPITAL | Age: 70
End: 2020-02-25

## 2020-02-25 ENCOUNTER — OFFICE VISIT (OUTPATIENT)
Dept: ONCOLOGY | Facility: CLINIC | Age: 70
End: 2020-02-25

## 2020-02-25 ENCOUNTER — TELEPHONE (OUTPATIENT)
Dept: ONCOLOGY | Facility: CLINIC | Age: 70
End: 2020-02-25

## 2020-02-25 VITALS — WEIGHT: 164.2 LBS | BODY MASS INDEX: 28.17 KG/M2

## 2020-02-25 VITALS
HEIGHT: 64 IN | DIASTOLIC BLOOD PRESSURE: 70 MMHG | TEMPERATURE: 98.1 F | HEART RATE: 67 BPM | RESPIRATION RATE: 18 BRPM | BODY MASS INDEX: 28.51 KG/M2 | SYSTOLIC BLOOD PRESSURE: 156 MMHG | OXYGEN SATURATION: 95 % | WEIGHT: 167 LBS

## 2020-02-25 DIAGNOSIS — C54.1 ENDOMETRIAL CANCER (HCC): Primary | Chronic | ICD-10-CM

## 2020-02-25 DIAGNOSIS — Z72.0 TOBACCO ABUSE: Chronic | ICD-10-CM

## 2020-02-25 DIAGNOSIS — C54.1 ENDOMETRIAL CANCER (HCC): Chronic | ICD-10-CM

## 2020-02-25 PROBLEM — J06.9 URI WITH COUGH AND CONGESTION: Status: ACTIVE | Noted: 2020-02-25

## 2020-02-25 LAB
FLUAV AG NPH QL: NEGATIVE
FLUBV AG NPH QL IA: NEGATIVE

## 2020-02-25 PROCEDURE — 87804 INFLUENZA ASSAY W/OPTIC: CPT

## 2020-02-25 PROCEDURE — 77386: CPT | Performed by: RADIOLOGY

## 2020-02-25 PROCEDURE — 99213 OFFICE O/P EST LOW 20 MIN: CPT | Performed by: NURSE PRACTITIONER

## 2020-02-25 NOTE — PROGRESS NOTES
ONCOLOGY URGENT CARE CLINIC VISIT    Ele Strickland  [id number]  1950    Chief Complaint:  Cough and congestion    History of present illness:  Ele Strickland is a 69 y.o. year old female who is currently undergoing treatment for endometrial cancer with radiation.  She was seen in radiation today and had complaints of cough and overall not feeling well.  She was sent to clinic for a flu swab.  Upon presentation she was asked to be seen for further evaluation.  She was given an appointment in the oncology urgent care clinic for further evaluation and management.       Patient presents with complaints of cough and congestion for 2 days.  She states cough is productive with greenish sputum.  She reports postnasal drainage.  She has intermittent chills but no fevers.  She has mild shortness of breath with exertion.  She denies sore throat, headache, chest pain, or any other acute symptoms.  She did not get a flu shot.  She is concerned about having the flu due to overall not feeling well.  Patient with history of COPD and chronic bronchitis.  She continues to smoke 1 pack/day.        Oncology History:       Endometrial cancer (CMS/[facility])    11/7/2019 Initial Diagnosis     Endometrial cancer (CMS/[facility])      11/15/2019 Surgery     Surgery       Procedure: TYPE 1 RADICAL TOTAL LAPAROSCOPIC HYSTERECTOMY BILATERAL SALPINGOOPHORECTOMY, LYSIS OF ADHESTIONS, BILATERAL PELVIC LYMPH NODE DISSECTION WITH DAVINCI ROBOT              Final Diagnosis   1. UTERUS, HYSTERECTOMY:  Invasive endometrioid adenocarcinoma, FIGO 2.  Tumor measures 9.5x6.0x2.5 cm.  Tumor invades greater than half the thickness of the myometrium to within 1.5 mm of the serosal surface.   Tumor extends into endocervix and invades into cervical stroma.   Benign leiomyomata, intramural.  Chronic cervicitis with squamous metaplasia.  2. RIGHT AND LEFT FALLOPIAN TUBES AND OVARIES, BILATERAL SALPINGO-OOPHORECTOMY:  Benign fallopian tubes and ovaries.  Negative for  carcinoma.  3. RIGHT PELVIC LYMPH NODES, EXCISION:  Two benign lymph nodes negative for carcinoma (0/2).  4. LEFT PELVIC LYMPH NODES, RESECTION:  Four benign lymph nodes negative for carcinoma (0/4).              Past Medical History:   Diagnosis Date   • Anxiety and depression    • Cancer (CMS/HCC)    • Chronic back pain    • Chronic bronchitis (CMS/HCC)    • COPD (chronic obstructive pulmonary disease) (CMS/HCC)     no home O2   • Diabetes (CMS/HCC)    • Frequent urination    • Gall stones    • GERD (gastroesophageal reflux disease)    • Heart murmur    • High blood pressure    • Pinched nerve     back   • Wears dentures    • Wears glasses        Past Surgical History:   Procedure Laterality Date   • COLONOSCOPY  01/2020   • ENDOSCOPY     • EPIDURAL  02/2020   • HELLER MYOTOMY LAPAROSCOPIC WITH ENDOSCOPY  2012   • TOTAL LAPAROSCOPIC HYSTERECTOMY SALPINGO OOPHORECTOMY Bilateral 11/15/2019    Procedure: TYPE 1 RADICAL TOTAL LAPAROSCOPIC HYSTERECTOMY BILATERAL SALPINGOOPHORECTOMY, LYSIS OF ADHESTIONS, BILATERAL PELVIC LYMPH NODE DISSECTION WITH DAVINCI ROBOT;  Surgeon: Zuly Evans MD;  Location: [facility];  Service: [facility]   • UPPER GASTROINTESTINAL ENDOSCOPY  01/2020       Family History   Problem Relation Age of Onset   • Heart attack Mother    • Uterine cancer Mother 84   • Lung cancer Brother    • Colon cancer Half-Sister 50   • Colon cancer Paternal Aunt    • Colon cancer Cousin    • Heart attack Brother    • Hypertension Daughter    • Hypertension Son        Past medical history, social history and family history was reviewed.    Medications: The current medication list was reviewed and reconciled.     Allergies:  is allergic to augmentin [amoxicillin-pot clavulanate] and biaxin [clarithromycin].    Review of Systems:    Review of Systems   Constitutional: Positive for appetite change and chills. Negative for fatigue, fever and unexpected weight change.   HENT: Negative for mouth sores, sore throat and  "trouble swallowing.    Respiratory: Positive for cough and shortness of breath. Negative for wheezing.    Cardiovascular: Negative for chest pain, palpitations and leg swelling.   Gastrointestinal: Positive for diarrhea and nausea. Negative for abdominal distention, abdominal pain, constipation and vomiting.   Genitourinary: Negative for difficulty urinating, dysuria and frequency.   Musculoskeletal: Negative for arthralgias.   Skin: Negative for pallor, rash and wound.   Neurological: Negative for dizziness and weakness.   Hematological: Does not bruise/bleed easily.   Psychiatric/Behavioral: Negative for confusion and sleep disturbance. The patient is not nervous/anxious.        PHYSICAL EXAM:    Vitals:    02/25/20 0947   BP: 156/70  Comment: LUE   Pulse: 67   Resp: 18   Temp: 98.1 °F (36.7 °C)   TempSrc: Temporal   SpO2: 95%  Comment: RA   Weight: 75.8 kg (167 lb)   Height: 162.6 cm (64\")   PainSc: 0-No pain       ECOG: (1) Restricted in Physically Strenuous Activity, Ambulatory & Able to Do Work of Light Nature  General: well appearing female in no acute distress  HEENT: sclerae anicteric, oropharynx clear  Lymphatics: no cervical, supraclavicular, inguinal, or axillary adenopathy  Neck: Supple. No thyromegaly.  Cardiovascular: regular rate and rhythm, +murmur  Lungs: clear to auscultation bilaterally. No respiratory distress  Abdomen: soft, nontender, nondistended.  No palpable organomegaly  Extremities: no lower extremity edema, cyanosis, or clubbing  Skin: no rashes, lesions, bruising, or petechiae  Neuro: Alert and oriented x3. Moves all extremities.    Assessment and Plan:    1.  Endometrial cancer  2.  Upper respiratory infection with cough and congestion    Discussion: Patient currently undergoing radiation treatment for endometrial cancer.  She presents to clinic with complaints of cough and congestion x2 days.  She has history of COPD and chronic bronchitis.  She continues to smoke 1 pack/day.  Rapid " flu swab negative.  Symptoms likely related to upper respiratory infection versus bronchitis.  Encouraged supportive care with rest, fluids, and over-the-counter Mucinex and antihistamine.  Discussed smoking cessation.  Instructed patient to notify us if symptoms worsen or do not improve.    Elvira Edwards, APRN    2/25/2020

## 2020-02-25 NOTE — TELEPHONE ENCOUNTER
Radiation Nurse called to have patient swab for flu. Upon patient coming to be swab, patient request to be seen by APRN. For congestion. Patient will be added to [facility] APRN schedule.

## 2020-02-26 ENCOUNTER — DOCUMENTATION (OUTPATIENT)
Dept: NUTRITION | Facility: HOSPITAL | Age: 70
End: 2020-02-26

## 2020-02-26 ENCOUNTER — HOSPITAL ENCOUNTER (OUTPATIENT)
Dept: RADIATION ONCOLOGY | Facility: HOSPITAL | Age: 70
Discharge: HOME | End: 2020-02-26

## 2020-02-26 ENCOUNTER — TELEPHONE (OUTPATIENT)
Dept: ONCOLOGY | Facility: CLINIC | Age: 70
End: 2020-02-26

## 2020-02-26 PROCEDURE — 77386: CPT | Performed by: RADIOLOGY

## 2020-02-26 NOTE — PROGRESS NOTES
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  She states that she has a co-worker who has been out sick and she wonders if she had contracted an illness from him.  She was sent to Urgent Care Clinic and evaluated; determined to be upper respiratory infection vs. bronchitis and was given instructions for rest, fluids, Mucinex and antihistamine.    Oral food intake and hydration at home is marginal; provided suggestions for easy to prepare foods and beverages that patient may find appealing. Emotional support provided.  Encouraged continued intake of Ensure supplements and on days when intake is minimal, increase to 2-3 servings per day.  Diarrhea managed with Imodium.  Will continue to follow as needed.      "

## 2020-02-27 PROCEDURE — 77336 RADIATION PHYSICS CONSULT: CPT | Performed by: RADIOLOGY

## 2020-02-28 ENCOUNTER — HOSPITAL ENCOUNTER (OUTPATIENT)
Dept: RADIATION ONCOLOGY | Facility: HOSPITAL | Age: 70
Discharge: HOME | End: 2020-02-28

## 2020-02-28 PROCEDURE — 77386: CPT | Performed by: RADIOLOGY

## 2020-03-02 ENCOUNTER — HOSPITAL ENCOUNTER (OUTPATIENT)
Dept: RADIATION ONCOLOGY | Facility: HOSPITAL | Age: 70
Discharge: HOME | End: 2020-03-02

## 2020-03-02 ENCOUNTER — HOSPITAL ENCOUNTER (OUTPATIENT)
Dept: RADIATION ONCOLOGY | Facility: HOSPITAL | Age: 70
Setting detail: RADIATION/ONCOLOGY SERIES
Discharge: HOME | End: 2020-03-02

## 2020-03-02 PROCEDURE — 77386: CPT | Performed by: RADIOLOGY

## 2020-03-03 ENCOUNTER — HOSPITAL ENCOUNTER (OUTPATIENT)
Dept: RADIATION ONCOLOGY | Facility: HOSPITAL | Age: 70
Discharge: HOME | End: 2020-03-03

## 2020-03-03 ENCOUNTER — DOCUMENTATION (OUTPATIENT)
Dept: NUTRITION | Facility: HOSPITAL | Age: 70
End: 2020-03-03

## 2020-03-03 VITALS — WEIGHT: 165.3 LBS | BODY MASS INDEX: 28.37 KG/M2

## 2020-03-03 PROCEDURE — 77386: CPT | Performed by: RADIOLOGY

## 2020-03-03 NOTE — PROGRESS NOTES
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  Feeling much better and feels that she has fully recovered from the respiratory illness that she experienced last week.  Bowel management under good control; states that she is experiencing 1-2 BMs per day, some looser, some normal.    Will be available as needed.

## 2020-03-04 ENCOUNTER — HOSPITAL ENCOUNTER (OUTPATIENT)
Dept: RADIATION ONCOLOGY | Facility: HOSPITAL | Age: 70
Discharge: HOME | End: 2020-03-04

## 2020-03-04 PROCEDURE — 77290 THER RAD SIMULAJ FIELD CPLX: CPT | Performed by: RADIOLOGY

## 2020-03-05 PROCEDURE — 77295 3-D RADIOTHERAPY PLAN: CPT | Performed by: RADIOLOGY

## 2020-03-09 ENCOUNTER — HOSPITAL ENCOUNTER (OUTPATIENT)
Dept: RADIATION ONCOLOGY | Facility: HOSPITAL | Age: 70
Discharge: HOME | End: 2020-03-09

## 2020-03-09 PROCEDURE — 77770 HDR RDNCL NTRSTL/ICAV BRCHTX: CPT | Performed by: RADIOLOGY

## 2020-03-09 PROCEDURE — 57156 INS VAG BRACHYTX DEVICE: CPT | Performed by: RADIOLOGY

## 2020-03-09 PROCEDURE — C1717 BRACHYTX, NON-STR,HDR IR-192: HCPCS | Performed by: RADIOLOGY

## 2020-03-10 NOTE — PROGRESS NOTES
03/09/2020  Ele Strickland    HDR#   1  Patient presents for cylinder brachytherapy.  She has no complaints of urinary discomfort, diarrhea or vaginal irritation.  The cylinder was inserted and treatment of 6 Gy to the surface of the upper vaginal mucosa was delivered. Patient tolerated procedure well with no complications.  Physics survey was negative with no residual radioactivity.  Discharged to home in good condition.  She will return for next treatment.

## 2020-03-11 ENCOUNTER — HOSPITAL ENCOUNTER (OUTPATIENT)
Dept: RADIATION ONCOLOGY | Facility: HOSPITAL | Age: 70
Discharge: HOME | End: 2020-03-11

## 2020-03-11 PROCEDURE — 57156 INS VAG BRACHYTX DEVICE: CPT | Performed by: RADIOLOGY

## 2020-03-11 PROCEDURE — 77770 HDR RDNCL NTRSTL/ICAV BRCHTX: CPT | Performed by: RADIOLOGY

## 2020-03-11 PROCEDURE — 77336 RADIATION PHYSICS CONSULT: CPT | Performed by: RADIOLOGY

## 2020-03-11 PROCEDURE — C1717 BRACHYTX, NON-STR,HDR IR-192: HCPCS | Performed by: RADIOLOGY

## 2020-03-11 NOTE — PROGRESS NOTES
03/11/2020  Ele Strickland    HDR#   2  Patient presents for cylinder brachytherapy.  She has no complaints of urinary discomfort, diarrhea or vaginal irritation.  The cylinder was inserted and treatment of 6 Gy to the surface of the upper vaginal mucosa was delivered. Patient tolerated procedure well with no complications.  Physics survey was negative with no residual radioactivity.  Discharged to home in good condition.  She will return for next treatment.

## 2020-04-03 NOTE — PROGRESS NOTES
TELEMEDICINE FOLLOW-UP NOTE    PATIENT:                                                      Ele Strickland  MEDICAL RECORD #:                        [id number]  :                                                          1950  COMPLETION DATE:    3/11/2020  DIAGNOSIS:     Endometrial cancer (CMS/HCC)  - FIGO Stage I (cT1, cN0, cM0)  - FIGO Stage II (pT2, pN0, cM0)    Problem List Items Addressed This Visit        Genitourinary    Endometrial cancer (CMS/HCC) - Primary (Chronic)      Time Devoted to Visit: 25 min including time to review previous records, with 75% devoted to direct discussion.    Reason for Visit:  I personally contacted Ele Strickland  today in an effort to screen for coronavirus symptoms and also to perform her scheduled follow-up visit remotely in light of the coronavirus pandemic.  With respect to her specific risks for coronavirus, her screen is as follows:  COVID-19 RISK SCREEN     1. Has the patient had close contact without PPE with a lab confirmed COVID-19 (+) person or a person under investigation (PUI) for COVID-19 infection?  -- No     2. Has the patient provided care or had close contact with COVID-19(+) patient in a healthcare facility? --  No   She also denied any new respiratory symptoms or fever within the past 14 days.    I counseled her regarding safe practices for minimizing risk for infection including hand-washing, self-isolation, limiting contacts, and we also discussed what to do should she develop symptoms including fever, chills, new cough, shortness of breath, or new body aches.      BRIEF HISTORY:  Mrs. Strickland is a 70 y.o. female with history of Stage II endometrial adenocarcinoma.  She previously underwent hysterectomy and staging with Dr. Evans.  She then underwent adjuvant external beam radiation plus vaginal brachytherapy.  The pelvis received a dose of 45 Gy in 25 fractions, followed by a boost to the apex of the vagina with 12 Gy in 2 fractions in an effort  to limit her risk of developing a vaginal recurrence.  She tolerated treatments very well.  She developed grade 1 fatigue, which continues to persist.  In addition, she was recently told by her PCP that she was anemic so she began taking PO iron.  She continues to endorse chronic and stable left-sided pelvic pain and tingling, which she attributes to a pinched nerve.  This is managed with gabapentin and epidural injections.  She experienced a 1-week duration of diarrhea following her last brachytherapy treatment, with bowels back to normal.  She describes itching and dryness of the labia, with mild burning upon urination.  She otherwise denies urinary complaints, vaginal bleeding, discharge, pain, fever, or constitutional symptoms.     MEDICATIONS: Medication reconciliation for the patient was reviewed and confirmed in the electronic medical record.    Review of Systems   Constitutional: Positive for fatigue.   Respiratory: Positive for cough.    Genitourinary: Positive for dysuria.    Skin: Positive for itching (occ vaginal).        Denies skin changes to the groins, external genitalia, or perineum.   Neurological: Positive for numbness (left sided pelvic/leg pain and tingling from pinched nerve.  Dr. Hou managing).   All other systems reviewed and are negative.      KPS 80%    Physical Exam   Unable to perform as visit was conducted via telephone.  Patient did not have compatible video capabilities.       IMPRESSION:   Mrs. Strickland is a 70 y.o. female with history of Stage II endometrioid adenocarcinoma.  She underwent post-operative radiation with a course of adjuvant EBRT to the pelvis, followed by vaginal brachytherapy.  She completed all treatments 3 weeks ago and tolerated without difficulty.  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  Since today's visit was conducted via  telemedicine in light of the COVID19 pandemic, I was unable to conduct clinical exam.  I have recommended topical aloe vera, and if symptoms persisted without convincing evidence of UTI or candidiasis, would consider clobetasol.    We discussed the indications for a vaginal dilator, and I have given dilator and instructions for use to the  to mail to the patient.  We also discussed surveillance follow-up with clinical exams and expectations for response to treatment.      RECOMMENDATIONS:   Mrs. Strickland is scheduled for survivorship follow-up in Dr. Evans's clinic in 1 month.  I will schedule her to follow-up in our clinic in approximately 3 months.    Return in about 3 months (around 7/8/2020) for Office Visit.    Adrien Barrera, APRN

## 2020-04-08 ENCOUNTER — OFFICE VISIT (OUTPATIENT)
Dept: RADIATION ONCOLOGY | Facility: HOSPITAL | Age: 70
End: 2020-04-08

## 2020-04-08 ENCOUNTER — HOSPITAL ENCOUNTER (OUTPATIENT)
Dept: RADIATION ONCOLOGY | Facility: HOSPITAL | Age: 70
Setting detail: RADIATION/ONCOLOGY SERIES
Discharge: HOME | End: 2020-04-08

## 2020-04-08 DIAGNOSIS — C54.1 ENDOMETRIAL CANCER (HCC): Primary | Chronic | ICD-10-CM

## 2020-04-30 ENCOUNTER — OFFICE VISIT (OUTPATIENT)
Dept: CARDIOLOGY | Facility: CLINIC | Age: 70
End: 2020-04-30

## 2020-04-30 VITALS — BODY MASS INDEX: 28.51 KG/M2 | HEIGHT: 64 IN | WEIGHT: 167 LBS

## 2020-04-30 DIAGNOSIS — I10 ESSENTIAL HYPERTENSION: Primary | Chronic | ICD-10-CM

## 2020-04-30 DIAGNOSIS — R01.1 HEART MURMUR: Chronic | ICD-10-CM

## 2020-04-30 DIAGNOSIS — Z72.0 TOBACCO ABUSE: Chronic | ICD-10-CM

## 2020-04-30 DIAGNOSIS — E78.5 DYSLIPIDEMIA: ICD-10-CM

## 2020-04-30 DIAGNOSIS — R60.0 LOWER EXTREMITY EDEMA: ICD-10-CM

## 2020-04-30 PROCEDURE — 99442 PR PHYS/QHP TELEPHONE EVALUATION 11-20 MIN: CPT | Performed by: PHYSICIAN ASSISTANT

## 2020-04-30 RX ORDER — ESOMEPRAZOLE MAGNESIUM 40 MG/1
40 CAPSULE, DELAYED RELEASE ORAL AS NEEDED
COMMUNITY
End: 2020-01-01 | Stop reason: SDUPTHER

## 2020-04-30 RX ORDER — FERROUS SULFATE TAB EC 324 MG (65 MG FE EQUIVALENT) 324 (65 FE) MG
324 TABLET DELAYED RESPONSE ORAL
COMMUNITY

## 2020-04-30 NOTE — PROGRESS NOTES
[facility] - Office Note  lEe Strickland         [address] [facility] [address]  1950   [phone] (home)        Visit Type: Follow Up (tele visit).    PCP:  Sharon Acosta,     04/30/20   Ele Strickland is a 70 y.o.  female  retired.      Chief Complaint: FU on HLP, Murmur, VHDz    PROBLEM LIST/PMHx:    1.  Cardiac Murmur            A.  Echo 10/31/19 Dr. Myers:  Left ventricular systolic function is normal. Calculated EF = 58.0%. Moderate aortic valve calcification with moderate aortic stenosis. Peak gradient 59 mmHg, mean gradient 32 mmHg. Dimensionless index 0.4. Calculated aortic valve area 1.13 cm².  Mild to moderate aortic regurgitation with a pressure half-time of 577 ms. Vena contract of 0.4 cm.  Sigmoid-shaped ventricular septum is present.  Left ventricular diastolic (grade I) consistent with impaired relaxation.  Mild tricuspid valve regurgitation is present. Estimated right ventricular systolic pressure from tricuspid regurgitation is mildly elevated (35-45 mmHg).     2.  Exertional Dyspnea, mild  3.  Menopausal bleeding              A.  Pap smear with atypical glandular cells              B.  Biopsy confirms grade 2 endometrioid adenocarcinoma. Surgical intervention recommended, Dr. Zuly Evans.  4.  Occasional Lower Extremity Edema  5.  Ongoing smoking tobacco abuse, with chronic cough  6. Dyslipidemia  7.  Non Insulin Dependent Diabetes Mellitus II  8.  Anxiety/Depression  9.  COPD  10.  Achalasia   A.  Endoscopy and Colonoscopy 1/2020:  Mild gastritis, few benign polyps removed, no masses.    Allergies   Allergen Reactions   • Augmentin [Amoxicillin-Pot Clavulanate] GI Intolerance   • Biaxin [Clarithromycin] Hallucinations         Current Outpatient Medications:   •  albuterol sulfate  (90 Base) MCG/ACT inhaler, Inhale 2 puffs Every 4 (Four) Hours As Needed for Wheezing., Disp: , Rfl:   •  aspirin 81 MG EC tablet, Take 81 mg by mouth  Daily., Disp: , Rfl:   •  Aspirin-Acetaminophen-Caffeine (EXCEDRIN PO), Take  by mouth As Needed., Disp: , Rfl:   •  atorvastatin (LIPITOR) 40 MG tablet, Take 40 mg by mouth every night at bedtime., Disp: , Rfl: 1  •  DULoxetine (CYMBALTA) 60 MG capsule, TAKE 1 CAPSULE BY MOUTH ONCE DAILY FOR 90 DAYS, Disp: , Rfl: 1  •  esomeprazole (nexIUM) 40 MG capsule, Take 40 mg by mouth Every Morning Before Breakfast., Disp: , Rfl:   •  ferrous sulfate 324 (65 Fe) MG tablet delayed-release EC tablet, Take 324 mg by mouth Daily With Breakfast., Disp: , Rfl:   •  gabapentin (NEURONTIN) 300 MG capsule, Take 300 mg by mouth 3 (Three) Times a Day., Disp: , Rfl: 0  •  ibuprofen (ADVIL,MOTRIN) 200 MG tablet, Take 200 mg by mouth 2 (Two) Times a Day As Needed for Mild Pain ., Disp: , Rfl:   •  meloxicam (MOBIC) 7.5 MG tablet, Take 1 tablet by mouth As Needed., Disp: , Rfl:   •  metFORMIN (GLUCOPHAGE) 1000 MG tablet, Take 1,000 mg by mouth 2 (Two) Times a Day., Disp: , Rfl: 1  •  oxyCODONE (ROXICODONE) 5 MG immediate release tablet, Take 1 tablet by mouth Every 4-6 Hours As Needed for Moderate Pain ., Disp: 10 tablet, Rfl: 0  •  Tiotropium Bromide-Olodaterol (STIOLTO RESPIMAT IN), Inhale 2 puffs Daily., Disp: , Rfl:   •  triamterene-hydrochlorothiazide (DYAZIDE) 37.5-25 MG per capsule, Take 1 capsule by mouth Every Morning., Disp: , Rfl: 1    HPI  Ele Strickland is evaluated for follow up on a cardiac murmur/VHDz with mod AS by echo, HTN, HLP.  She is doing fairly well from a cardiac standpoint.  Her biggest complaint is that of back pain - which has been present for some time.  She finished chemo treatments March 11, 2020 and is slowly recovering from those.  She is extremely tired from treatment but states her appetite is okay.  She has low iron and started OTC supplementation for this.  When asked about side effects, she does state that her stools have been dark/black and she is asking if this is related to the Iron.    She still  "has quite a bit of acid reflux and takes nexium for this.  She underwent endoscopy and colonoscopy January 2020 with negative findings overall.          The following portions of the patient's history were reviewed in the chart and updated as appropriate: allergies, current medications, past family history, past medical history, past social history, past surgical history and problem list.    Review of Systems   Constitution: Negative for fever and weight loss.   Cardiovascular: Positive for dyspnea on exertion and leg swelling. Negative for chest pain, near-syncope and palpitations.   Respiratory: Positive for wheezing. Negative for cough.    Skin: Negative for itching.   Gastrointestinal: Positive for bloating, constipation, heartburn and nausea.   Neurological: Positive for headaches.   Psychiatric/Behavioral: The patient is nervous/anxious.    Allergic/Immunologic: Positive for environmental allergies.   All other systems reviewed and are negative.            height is 162.6 cm (64\") and weight is 75.8 kg (167 lb).   Physical Exam  Not performed secondary to being a tele visit.    Procedures     Assessment/ Plan   Dyslipidemia:  Continue statin.  Have put an order in for lipid panel to be drawn with upcoming labs.  Will follow up to review.    Heart murmur:  Patient seems to be doing well.  Has no complaints of angina, no syncope, no CHF type symptoms.  Advised her to notify us if she has any change in symptoms.  Consider repeating echo at the end of year/beginning 2021.    Lower extremity edema:  Still with occasional edema. Somewhat worse because she is not working at the [employer] right now and is consuming more Na.  Continue to monitor - advised management of sodium intake and elevation of her feet when sitting/resting.      This patient has consented to a telehealth visit via phone. The visit was scheduled as a phone visit to comply with patient safety concerns in accordance with CDC recommendations.  All " vitals recorded within this visit are reported by the patient.  I spent  20 minutes in total including but not limited to the 20 minutes spent in direct conversation with this patient.       Angie Goddard PA-C functioning independently.  4/30/2020 09:47

## 2020-05-07 ENCOUNTER — OFFICE VISIT (OUTPATIENT)
Dept: GYNECOLOGIC ONCOLOGY | Facility: CLINIC | Age: 70
End: 2020-05-07

## 2020-05-07 DIAGNOSIS — C54.1 ENDOMETRIAL CANCER (HCC): Primary | Chronic | ICD-10-CM

## 2020-05-07 DIAGNOSIS — Z72.0 TOBACCO ABUSE: Chronic | ICD-10-CM

## 2020-05-07 DIAGNOSIS — N39.46 MIXED STRESS AND URGE URINARY INCONTINENCE: ICD-10-CM

## 2020-05-07 PROBLEM — R93.89 THICKENED ENDOMETRIUM: Status: RESOLVED | Noted: 2019-10-23 | Resolved: 2020-05-07

## 2020-05-07 PROBLEM — R87.619 ATYPICAL GLANDULAR CELLS OF UNDETERMINED SIGNIFICANCE (AGUS) ON CERVICAL PAP SMEAR: Status: RESOLVED | Noted: 2019-10-23 | Resolved: 2020-05-07

## 2020-05-07 PROBLEM — Z01.818 PRE-OP EXAMINATION: Status: RESOLVED | Noted: 2019-11-07 | Resolved: 2020-05-07

## 2020-05-07 PROCEDURE — 99443 PR PHYS/QHP TELEPHONE EVALUATION 21-30 MIN: CPT | Performed by: NURSE PRACTITIONER

## 2020-05-07 NOTE — PROGRESS NOTES
"TELEMEDICINE FOLLOW-UP     In order to limit face-to-face contact and enact \"social distancing\" in light of the COVID-19 outbreaks and in accordance to the recommendations per the CDC, WHO, and our individual department, Ele Strickland  was contacted.  Telephone visit was used to screen the patient for needs and conduct the patient's regularly scheduled follow-up.     COVID-19 screening: female specifically denies fever, body aches, cough, difficulty breathing, sore throat, runny nose, recent travel, or known sick exposures.        You have chosen to receive care through a telephone visit. Do you consent to use a telephone visit for your medical care today? Yes      GYN ONCOLOGY CANCER SURVIVORSHIP VISIT    Ele Strickland  [id number]  1950    Chief Complaint: Follow-up (Survivorship)        History of present illness:  Ele Strickland is a 70 y.o. year old female who is here today for her Cancer Survivorship visit, see oncology history below. She feels well recovered from surgery and radiation. She has chronic pain r/t pinched nerve, see [facility], follow-up there later today. She denies any new or acute pain. She c/o worsening urinary incontinence since her cancer surgery. She describes trouble holding her urine, increased urgency, leaking on way to the restroom, and leaking with coughing/sneezing. She has started 2 kinds of AZO OTC, one for UTI prevention and one for bladder control, has been taking both only about 2 weeks, unsure if helping yet. She denies fevers, dysuria, or hematuria. She denies symptoms of coronavirus. She denies vaginal bleeding, pelvic pain, concerning lesions, or changes in bowel function. She continues to smoke 1 PPD, unsure if she is ready to quit.          Cancer History:      Endometrial cancer (CMS/HCC)    10/23/2019 Initial Diagnosis     Patient referred to Gyn Oncology due to postmenopausal bleeding x 10 months and pap smear showing atypical glandular cells " with negative HPV.   Endometrial biopsy obtained. Pathology revealed grade 2 endometrioid adenocarcinoma.       11/11/2019 Imaging     Pre-op CT abdomen pelvis showed lobular hypoenhancing mass in the uterus and the cervix, presumably known neoplasm. Incidental right lobe liver hemangioma noted, unchanged from previous imaging. No evidence of metastatic disease.       11/15/2019 Surgery     Type 1 radical RTLH/BSO, lysis of adhesions, and bilateral pelvic lymph node dissection.     Final pathology showed 9.5 x 6.0 x 2.5 cm grade 2 EAC with focal mucinous and secretory features, invasive into >90% of the myometrium. Tumor invades cervical stroma. No lymphvascular invasion and nodes negative.  MSI by IHC showed absent MLH1 and PMS2, reflex hypermethylation positive = sporadic tumor.  Stage II grade 2         Past Medical History:   Diagnosis Date   • Anxiety and depression    • Chronic back pain    • Chronic bronchitis (CMS/HCC)    • COPD (chronic obstructive pulmonary disease) (CMS/HCC)     no home O2   • Diabetes (CMS/HCC)    • Endometrial cancer (CMS/HCC) 11/2019    Stage II   • Essential hypertension 7/5/2019   • Frequent urination    • Gall stones    • GERD (gastroesophageal reflux disease)    • Heart murmur    • High blood pressure    • History of brachytherapy 03/11/2020    vagina   • History of radiation therapy 03/03/2020    Pelvis   • Pinched nerve     back   • Wears dentures    • Wears glasses        Past Surgical History:   Procedure Laterality Date   • COLONOSCOPY  01/2020   • ENDOSCOPY     • EPIDURAL  02/2020   • HELLER MYOTOMY LAPAROSCOPIC WITH ENDOSCOPY  2012   • TOTAL LAPAROSCOPIC HYSTERECTOMY SALPINGO OOPHORECTOMY Bilateral 11/15/2019    Procedure: TYPE 1 RADICAL TOTAL LAPAROSCOPIC HYSTERECTOMY BILATERAL SALPINGOOPHORECTOMY, LYSIS OF ADHESTIONS, BILATERAL PELVIC LYMPH NODE DISSECTION WITH DAVINCI ROBOT;  Surgeon: Zuly Evans MD;  Location: [facility];  Service: DaVinci   • UPPER  GASTROINTESTINAL ENDOSCOPY  01/2020       MEDICATIONS: The current medication list was reviewed and reconciled.     Allergies:  is allergic to augmentin [amoxicillin-pot clavulanate] and biaxin [clarithromycin].    Family History   Problem Relation Age of Onset   • Heart attack Mother    • Uterine cancer Mother 84   • Lung cancer Brother    • Colon cancer Half-Sister 50   • Colon cancer Paternal Aunt    • Colon cancer Cousin    • Heart attack Brother    • Hypertension Daughter    • Hypertension Son        Last imaging study was CT abdomen pelvis 11/11/2019.     Review of Systems   Constitutional: Negative for appetite change, chills, fatigue, fever and unexpected weight change.   Respiratory: Negative for cough, shortness of breath and wheezing.    Cardiovascular: Negative for chest pain, palpitations and leg swelling.   Gastrointestinal: Negative for abdominal distention, abdominal pain, blood in stool, constipation, diarrhea, nausea and vomiting.   Endocrine: Negative.    Genitourinary: Positive for enuresis and urgency. Negative for dyspareunia, dysuria, frequency, genital sores, hematuria, pelvic pain, vaginal bleeding, vaginal discharge and vaginal pain.   Musculoskeletal: Positive for arthralgias and myalgias. Negative for gait problem and joint swelling.   Neurological: Negative for dizziness, seizures, syncope, weakness, light-headedness, numbness and headaches.   Hematological: Negative for adenopathy.   Psychiatric/Behavioral: Negative.          Physical Exam  Vital Signs: N/A, telemedicine visit. Patient afebrile at home.   Vitals:    05/07/20 1517   PainSc: 0-No pain           Constitutional:  alert, cooperative, no apparent distress. A&O x 3 with appropriate conversation   HEENT:  Normocephalic, without obvious abnormality, mucous membranes moist per patient   Lungs:   No audible respiratory distress.    Heart:  Regular rate and rhythm per patient   Abdomen:   Soft, non-tender and non-distended per  "patient   Pelvic: deferred     ECOG Performance Status: (0) Fully Active - Able to Carry On All Pre-disease Performance Without Restriction      Survivorship Needs Assessment:  PT/Rehab  Health history, treatment course, and current status. The patient is not in need of physical therapy or rehabilitation services.    Behavioral Health  A post-treatment depression screening has not yet been completed, to be reviewed at next in-person visit.       Procedure Note:  No notes on file      Assessment and Plan:  Ele was seen today for follow-up.    Diagnoses and all orders for this visit:    Endometrial cancer (CMS/HCC)    Mixed stress and urge urinary incontinence    Tobacco abuse          There are no symptoms suspicious for cancer recurrence noted upon today's assessment. We discussed plan to alternate visits between gyn oncology and radiation oncology every 3 months for the first 2 years. She is scheduled to see ERIKA Ochoa next in 7/2020. She is understanding to call with any questions or concerns at any time between regularly scheduled visits.     We discussed her urinary complaints, symptoms consistent with mixed incontinence. Patient encouraged to continue OTC medications and add regular Kegel exercises and bladder retraining. If symptoms worsen or persist at next visit, will evaluate further with pelvic exam and consider prescription therapy. She v/u.     I advised the patient of the risks in continuing to use tobacco. I also discussed how to quit smoking and the patient has expressed that she \"will try\" to quit. We further discussed a more practical plan with goals to assess progress. At the end of the discussion we were in agreement with goal of cutting back to 1/2 PPD in the next 3-6 months. She feels this goal is attainable. Will continue to follow.   During this visit, I spent 5 counseling the patient regarding smoking cessation.     The patient and I have reviewed her Survivorship Care Plan in " detail. We discussed her diagnosis, pathology, histology, all treatments, and ongoing surveillance recommendations. All questions were answered to her satisfaction. She is in agreement with our plan for ongoing surveillance as outlined in her plan. A copy of this document was mailed to her at the completion of our visit.  A copy has also been sent to her primary care provider.    Pain assessment was performed today as a part of patient’s care. For patients with pain related to surgery, gynecologic malignancy or cancer treatment, the plan is as noted in the assessment/plan.  For patients with pain not related to these issues, they are to seek any further needed care from a more appropriate provider, such as PCP.      This was a 25 minute telephone visit spent in counseling and coordination of care as above.       Return to clinic in 10/2020 for ongoing cancer surveillance.      ERIKA Alvarado

## 2020-07-10 NOTE — PROGRESS NOTES
FOLLOW UP NOTE    PATIENT:                                                      Ele Strickland  MEDICAL RECORD #:                        [id number]  :                                                          1950  COMPLETION DATE:   3/11/2020  DIAGNOSIS:     Endometrial cancer (CMS/HCC)  - Clinical FIGO Stage I (cT1, cN0, cM0)  - Pathological FIGO Stage II (pT2, pN0, cM0)      BRIEF HISTORY:  Ms. Strickland is a 70 y.o. female returning for routine follow-up.  She has a history of stage II endometrial cancer, and previously underwent hysterectomy and staging with Dr. Evans.  She completed adjuvant external beam radiation plus vaginal brachytherapy in 2020.  She tolerated treatment well.  She denies gynecological complaints including vaginal bleeding, discharge, or pelvic pain.  She received a vaginal dilator in the mail, but admits she is not sexually active or using the dilator.  She has had no change to her chronic bowel irregularities.  She notes progressive urinary incontinence, without improvement from OTC medications or pelvic floor exercises.  She states that she soaks 2 Depends daily, and describes both stress and urge incontinence.  She continues with chronic back and left hip/leg pain related to a pinched nerve and states that she was recently diagnosed with spinal stenosis.  She is managed with epidural injections and gabapentin, and is followed by Dr. Hou at [facility].  Energy level is fair.  She states that her chronic pain, as well as financial worries amid the COVID-19 pandemic, have caused her to feel depressed.  She continues to smoke 1 pack/day, and does not feel she is willing or able to quit at this time.  She is otherwise without acute complaints.      MEDICATIONS: Medication reconciliation for the patient was reviewed and confirmed in the electronic medical record.    Review of Systems   Constitutional: Positive for fatigue.   Respiratory: Positive for cough.     Gastrointestinal: Positive for constipation (chronic, occasional).   Genitourinary: Positive for bladder incontinence.    Musculoskeletal: Positive for back pain.   Neurological: Positive for numbness (Left-sided pelvic/leg tingling from pinched nerve.  Dr. Ameya hernandez).   Psychiatric/Behavioral: Positive for depression. The patient is nervous/anxious.    All other systems reviewed and are negative.      KPS 80%    Physical Exam   Constitutional: She is oriented to person, place, and time. She appears well-developed and well-nourished. No distress.   HENT:   Head: Normocephalic and atraumatic.   Eyes: Pupils are equal, round, and reactive to light. Conjunctivae are normal.   Neck: Normal range of motion. Neck supple.   Cardiovascular: Normal rate and regular rhythm. Exam reveals no friction rub.   Murmur (2/6) heard.  Pulmonary/Chest: Effort normal. No respiratory distress. She has wheezes (End expiratory wheezes to bilateral lower lobes).   Abdominal: Soft. Bowel sounds are normal. She exhibits no distension and no mass. There is no tenderness.   Large abdominal pannus   Genitourinary:   Genitourinary Comments: Pelvic exam reveals normal external genitalia without lesions.  Vagina is pale, atrophic, and changes consistent with previous radiation.  No lesions, nodules, or palpable masses.  Vaginal cuff is smooth, intact.  No evidence of cystocele.     Musculoskeletal: Normal range of motion. She exhibits no edema.   Lymphadenopathy:     She has no cervical adenopathy.        Right: No inguinal adenopathy present.        Left: No inguinal adenopathy present.   Neurological: She is alert and oriented to person, place, and time.   Skin: Skin is warm and dry.   Moist, erythematous skin rash within the abdominal pannus fold and bilateral inner groins, right > left   Psychiatric: She has a normal mood and affect. Her behavior is normal. Judgment and thought content normal.   Nursing note and vitals  "reviewed.      VITAL SIGNS:   Vitals:    07/10/20 1116   BP: 156/68   Pulse: 82   Resp: 16   Temp: 98.1 °F (36.7 °C)   TempSrc: Temporal   SpO2: 96%  Comment: ALBA   Weight: 77.2 kg (170 lb 3.2 oz)   Height: 162.6 cm (64.02\")   PainSc:   7   PainLoc: Back       The following portions of the patient's history were reviewed and updated as appropriate: allergies, current medications, past family history, past medical history, past social history, past surgical history and problem list.         Ele was seen today for endometrial cancer.    Diagnoses and all orders for this visit:    Endometrial cancer (CMS/HCC)    Mixed stress and urge urinary incontinence  -     oxybutynin XL (DITROPAN-XL) 5 MG 24 hr tablet; Take 1 tablet by mouth Daily.    Candidiasis of skin  -     nystatin (MYCOSTATIN) 460487 UNIT/GM powder; Apply  topically to the appropriate area as directed 3 (Three) Times a Day.         IMPRESSION:  Ms. Strickland is a 70 y.o. female with history of stage II endometrioid adenocarcinoma.  She is 4 months s/p postoperative radiation with a course of adjuvant EBRT to the pelvis, followed by vaginal brachytherapy.  She tolerated treatment well.  She is without evidence of disease recurrence on today's exam.  From a symptomatic standpoint, her chief complaint is regarding her urgency-predominant mixed urinary incontinence.  We reviewed timed voiding, Keagel exercises, and lifestyle modifications including decrease caffeine intake.  I have sent a prescription for oxybutynin XR to her pharmacy.  I also sent topical nystatin for her yeast infection within her pannus.  I suggested letting the affected area get airflow and use a cotton barrier between skin folds.  I have offered to refer to our mental health APRN, Yani Vann.  Ms. Strickland states she will consider this but is not decided at this time.  Complete smoking cessation was again reviewed.  Ms. Strickland and I discussed follow-up intervals and timeframe for routine clinical " exams.    RECOMMENDATIONS:   Ms. Strickland remains under the GYN oncologic care of Dr. Evans and will follow up in October 2020.  I will schedule her to return to our clinic 3 months thereafter.    Return in about 6 months (around 1/12/2021) for Office Visit.    Adrien Barrera, APRN

## 2020-10-21 NOTE — PROGRESS NOTES
GYN ONCOLOGY CANCER SURVEILLANCE FOLLOW-UP    Ele Strickland  [id number]  1950    Chief Complaint: Follow-up (c/o left chest side pain, bloody sputum, and weight loss)        History of present illness:  Ele Strickland is a 70 y.o. year old female who is here today for ongoing surveillance of Endometrial Cancer, see Cancer History. She is alternating surveillance visits between our office and radiation oncology, scheduled for next visit there on 1/15/2021.     Upon arrival today patient has several new concerning complaints.  She complains of left lateral chest pain, new bloody sputum, and weight loss of 20 pounds in 3 months with decreased appetite.  Patient is a lifelong smoker, currently continues at 1 pack/day.  She has known COPD, chronic shortness of air, cough, and dyspnea.  She feels her respiratory symptoms may be worsening.  She notes increased severity of pain with deep breaths, requiring her to use Excedrin all day to manage the pain.  She complains of left lateral chest pain to the side of the breast rates 8 out of 10 on pain scale.  Since worsening of the symptoms she has also noticed new bloody sputum associated with her productive cough.  Weight trends in EMR are consistent with patient report of weight loss, down 21 pounds since July 2020.  Patient reports she called her PCP office to discuss these concerns, but they were out of town and she was unable to get an appointment.  She also reports calling her established pulmonologist last week, but was not given an appointment until December 2020.    Patient denies any gynecologic complaints or changes in pelvic symptoms.  She denies vaginal bleeding, pelvic pain, concerning lesions, or changes in bowel or bladder function.  Last imaging was almost 1 year ago.       Cancer History:   Oncology/Hematology History   Endometrial cancer (CMS/HCC)   10/23/2019 Initial Diagnosis    Patient referred to Gyn Oncology due to postmenopausal bleeding x 10 months  and pap smear showing atypical glandular cells with negative HPV.   Endometrial biopsy obtained. Pathology revealed grade 2 endometrioid adenocarcinoma.      11/11/2019 Imaging    Pre-op CT abdomen pelvis showed lobular hypoenhancing mass in the uterus and the cervix, presumably known neoplasm. Incidental right lobe liver hemangioma noted, unchanged from previous imaging. No evidence of metastatic disease.      11/15/2019 Surgery    Type 1 radical RTLH/BSO, lysis of adhesions, and bilateral pelvic lymph node dissection.     Final pathology showed 9.5 x 6.0 x 2.5 cm grade 2 EAC with focal mucinous and secretory features, invasive into >90% of the myometrium. Tumor invades cervical stroma. No lymphvascular invasion and nodes negative.  MSI by IHC showed absent MLH1 and PMS2, reflex hypermethylation positive = sporadic tumor.  Stage II grade 2     1/24/2020 - 3/11/2020 Radiation    Adjuvant external beam radiation plus vaginal brachytherapy.  The pelvis received a dose of 45 Gy in 25 fractions, followed by a boost to the apex of the vagina with 12 Gy in 2 fractions.  Treatments tolerated well.      5/7/2020 Survivorship    Survivorship Care Plan completed and discussed with patient.  Copy of Survivorship Care Plan provided to patient and primary care provider.         Past Medical History:   Diagnosis Date   • Anxiety and depression    • Chronic back pain    • Chronic bronchitis (CMS/HCC)    • COPD (chronic obstructive pulmonary disease) (CMS/HCC)     no home O2   • Diabetes (CMS/HCC)    • Endometrial cancer (CMS/HCC) 11/2019    Stage II   • Essential hypertension 7/5/2019   • Frequent urination    • Gall stones    • GERD (gastroesophageal reflux disease)    • Heart murmur    • High blood pressure    • History of brachytherapy 03/11/2020    vagina   • History of radiation therapy 03/03/2020    Pelvis   • Pinched nerve     back   • Wears dentures    • Wears glasses        Past Surgical History:   Procedure Laterality  Date   • COLONOSCOPY  01/2020   • ENDOSCOPY     • EPIDURAL  02/2020   • HELLER MYOTOMY LAPAROSCOPIC WITH ENDOSCOPY  2012   • TOTAL LAPAROSCOPIC HYSTERECTOMY SALPINGO OOPHORECTOMY Bilateral 11/15/2019    Procedure: TYPE 1 RADICAL TOTAL LAPAROSCOPIC HYSTERECTOMY BILATERAL SALPINGOOPHORECTOMY, LYSIS OF ADHESTIONS, BILATERAL PELVIC LYMPH NODE DISSECTION WITH DAVINCI ROBOT;  Surgeon: Zuly Evans MD;  Location: [facility];  Service: DaVinci   • UPPER GASTROINTESTINAL ENDOSCOPY  01/2020       MEDICATIONS: The current medication list was reviewed and reconciled.     Allergies:  is allergic to augmentin [amoxicillin-pot clavulanate] and biaxin [clarithromycin].    Family History   Problem Relation Age of Onset   • Heart attack Mother    • Uterine cancer Mother 84   • Lung cancer Brother    • Colon cancer Half-Sister 50   • Colon cancer Paternal Aunt    • Colon cancer Cousin    • Heart attack Brother    • Hypertension Daughter    • Hypertension Son        Last imaging study was CT abdomen pelvis 11/11/2019.     Review of Systems   Constitutional: Positive for appetite change, fatigue and unexpected weight change (21 lb loss in 3 months unintentional). Negative for chills and fever.   Respiratory: Positive for cough (productive, new bloody sputum) and shortness of breath. Negative for wheezing.    Cardiovascular: Positive for chest pain (left lateral chest, worse with deep breaths). Negative for palpitations and leg swelling.   Gastrointestinal: Negative for abdominal distention, abdominal pain, blood in stool, constipation, diarrhea, nausea and vomiting.   Endocrine: Negative.    Genitourinary: Negative for dysuria, frequency, genital sores, hematuria, pelvic pain, urgency, vaginal bleeding, vaginal discharge and vaginal pain.   Musculoskeletal: Positive for arthralgias. Negative for gait problem and joint swelling.   Neurological: Positive for weakness. Negative for dizziness, seizures, syncope, light-headedness,  "numbness and headaches.   Hematological: Negative for adenopathy.   Psychiatric/Behavioral: Negative.            Physical Exam  Vital Signs: /65   Pulse 77   Temp 96.6 °F (35.9 °C) (Temporal)   Resp 19   Ht 162.6 cm (64.02\")   Wt 67.6 kg (149 lb 1.6 oz)   LMP  (LMP Unknown)   SpO2 94%   BMI 25.58 kg/m²      Wt Readings from Last 10 Encounters:   10/21/20 67.6 kg (149 lb 1.6 oz)   07/10/20 77.2 kg (170 lb 3.2 oz)   04/30/20 75.8 kg (167 lb)   03/03/20 75 kg (165 lb 4.8 oz)   02/25/20 74.5 kg (164 lb 3.2 oz)   02/25/20 75.8 kg (167 lb)   02/18/20 75.4 kg (166 lb 3.2 oz)   02/11/20 76.5 kg (168 lb 9.6 oz)   02/04/20 76.3 kg (168 lb 4.8 oz)   01/28/20 78.4 kg (172 lb 14.4 oz)       Vitals:    10/21/20 0930   PainSc:   8           General Appearance:  alert, cooperative, no apparent distress and appears stated age   Neurologic/Psychiatric: A&O x 3, gait steady, appropriate affect   HEENT:  Normocephalic, without obvious abnormality, mucous membranes moist   Back:   Symmetric, no curvature, ROM normal, no CVA tenderness   Lungs:   Productive cough noted, deep inspiration limited d/t pain, coarse ronchi throughout   Heart:  Regular rate and rhythm, no murmurs appreciated   Breasts:  deferred   Abdomen:   Soft, non-tender, non-distended and no organomegaly   Lymph nodes: No cervical, supraclavicular, inguinal adenopathy noted   Extremities: Normal, atraumatic; no clubbing, cyanosis, or edema    Pelvic: External Genitalia  atrophic, without lesions  Vagina  is pale, atrophic.   Vaginal Cuff  Female Vaginal Cuff: smooth, intact, without visible lesions and changes consistent with previous radiation  Uterus  surgically absent and no palpable masses  Ovaries  surgically absent bilaterally  Parametria  smooth  Rectovaginal  Female rectovaginal: deferred     ECOG Performance Status: (1) Restricted in Physically Strenuous Activity, Ambulatory & Able to Do Work of Light Nature    Procedure Note:  No notes on file    "   Assessment and Plan:  Diagnoses and all orders for this visit:    1. Endometrial cancer (CMS/HCC) (Primary)  -     CT Abdomen Pelvis With Contrast; Future  -     CT Chest With Contrast; Future    2. Tobacco abuse  -     CT Chest With Contrast; Future    3. Dyspnea on exertion  -     CT Chest With Contrast; Future    4. Cough with hemoptysis  -     CT Chest With Contrast; Future    5. Unexplained weight loss  -     CT Abdomen Pelvis With Contrast; Future  -     CT Chest With Contrast; Future            No evidence of recurrent pelvic disease on physical exam today.  However, due to her new respiratory symptoms and unexplained weight loss, there is certainly room for concern.  CT imaging is needed now to rule out recurrent endometrial cancer as well as possibility of lung primary as patient is a lifelong smoker with known COPD.  She will be notified upon return of imaging results.  If there is concern for lung disease, she will be referred to pulmonary or lung nodule clinic.  Precautions given to patient including reasons that she should report to the emergency room.  She verbalized understanding.    Pain assessment was performed today as a part of patient’s care.  For patients with pain related to surgery, gynecologic malignancy or cancer treatment, the plan is as noted in the assessment/plan.  For patients with pain not related to these issues, they are to seek any further needed care from a more appropriate provider, such as PCP.  Patient encouraged to continue Excedrin as needed.  Present to ER for any intractable pain or acute worsening of symptoms.      Return to clinic to be determined upon return of CT results.      Electronically signed by ERIKA Alvarado on 10/27/20 at 11:02 EDT

## 2020-11-02 NOTE — TELEPHONE ENCOUNTER
Attempted to call patient to review CT results. There was no answer, voicemail message left for patient to return call to our office at her earliest convenience. Multiple bilateral lung nodules/masses found on CT, consistent with patient's symptoms. I am referring to lung nodule clinic for biopsies to investigate recurrent endometrial ca vs lung primary

## 2020-11-03 NOTE — TELEPHONE ENCOUNTER
Called patient again and was able to reach her. I explained results of her CT scan, multiple masses throughout bilateral lungs that appear to be malignant. Pulmonology is also trying to reach her to get her set up with Dr. Oliver REEVES. Explained to patient the importance of moving forward quickly and getting tissue to determine nature of these masses. She is understandable upset and concerned. Number given for Dr. Boyd's office, patient states she will call them immediately after our call.

## 2020-11-03 NOTE — TELEPHONE ENCOUNTER
Received new patient referral from JIMMY TEIXEIRA. I had Shanice Jann Nurse Navigator look at imaging for Lung Nodule Clinic, when looking into patients chart she has been seen in our office by Dr. David Boyd and Rachel JAMES within the past 12 months. I documented this in the referral stating patient did not need new patient referral and we would schedule her as soon as possible with Dr. David Boyd. I called patient and left voice mail for patient to call me back on my direct line to schedule appointment week of November 9, 2020 with Dr. CINDI Boyd for recent imaging and referral sent to our office by Jimmy Teixeira. I will call again tomorrow if no response from patient.

## 2020-11-10 PROBLEM — R04.2 HEMOPTYSIS: Status: ACTIVE | Noted: 2020-01-01

## 2020-11-10 PROBLEM — R91.8 LUNG MASS: Status: ACTIVE | Noted: 2020-01-01

## 2020-11-10 NOTE — PROGRESS NOTES
PULMONARY  NOTE    Chief Complaint     Tobacco abuse, chronic cough, abnormal CT scan of the chest, hemoptysis, endometrial cancer    History of Present Illness     70-year-old white female returns today for follow-up for an abnormal CT scan of the chest    I had originally seen her on 10/25/2019 for preoperative evaluation.  She was diagnosed with endometrial cancer.  At that time she had chest imaging which revealed no suspicious intrathoracic lesions.    She has developed hemoptysis.  This is consisted of pink or blood-streaked sputum.  She underwent repeat chest imaging which now reveals bilateral bulky parenchymal lesions some of which are cavitated.  She has been referred back to our office.    She has a history of ongoing tobacco abuse with no plans for smoking cessation    She has a history of reflux for which she intermittently takes Nexium    She has noted no lower extremity edema.  She has a history of a cardiac murmur    Patient Active Problem List   Diagnosis   • Heart murmur   • Dyspnea on exertion   • Cardiomegaly   • Lower extremity edema   • Tobacco abuse   • Chronic GERD   • Chronic obstructive lung disease (CMS/HCC)   • Neuropathy   • Nicotine dependence   • Systolic murmur   • Type 2 diabetes mellitus without complication (CMS/HCC)   • Achalasia of esophagus   • Endometrial cancer (CMS/HCC)   • Anxiety and depression   • URI with cough and congestion   • Dyslipidemia   • Bilateral lung masses (new from 2019)   • Submassive hemoptysis     Allergies   Allergen Reactions   • Augmentin [Amoxicillin-Pot Clavulanate] GI Intolerance   • Biaxin [Clarithromycin] Hallucinations       Current Outpatient Medications:   •  albuterol sulfate  (90 Base) MCG/ACT inhaler, Inhale 2 puffs Every 4 (Four) Hours As Needed for Wheezing., Disp: , Rfl:   •  aspirin 81 MG EC tablet, Take 81 mg by mouth Daily., Disp: , Rfl:   •  Aspirin-Acetaminophen-Caffeine (EXCEDRIN PO), Take  by mouth As Needed., Disp: , Rfl:    •  DULoxetine (CYMBALTA) 60 MG capsule, TAKE 1 CAPSULE BY MOUTH ONCE DAILY FOR 90 DAYS, Disp: , Rfl: 1  •  esomeprazole (nexIUM) 40 MG capsule, Take 40 mg by mouth Every Morning Before Breakfast., Disp: , Rfl:   •  ferrous sulfate 324 (65 Fe) MG tablet delayed-release EC tablet, Take 324 mg by mouth Daily With Breakfast., Disp: , Rfl:   •  gabapentin (NEURONTIN) 300 MG capsule, Take 300 mg by mouth 3 (Three) Times a Day., Disp: , Rfl: 0  •  ibuprofen (ADVIL,MOTRIN) 200 MG tablet, Take 200 mg by mouth 2 (Two) Times a Day As Needed for Mild Pain ., Disp: , Rfl:   •  meloxicam (MOBIC) 7.5 MG tablet, Take 1 tablet by mouth As Needed., Disp: , Rfl:   •  metFORMIN (GLUCOPHAGE) 1000 MG tablet, Take 1,000 mg by mouth 2 (Two) Times a Day., Disp: , Rfl: 1  •  nystatin (MYCOSTATIN) 320771 UNIT/GM powder, Apply  topically to the appropriate area as directed 3 (Three) Times a Day., Disp: 30 g, Rfl: 1  •  oxybutynin XL (DITROPAN-XL) 5 MG 24 hr tablet, Take 1 tablet by mouth Daily., Disp: 30 tablet, Rfl: 11  •  oxyCODONE (ROXICODONE) 5 MG immediate release tablet, Take 1 tablet by mouth Every 4-6 Hours As Needed for Moderate Pain ., Disp: 10 tablet, Rfl: 0  •  simvastatin (ZOCOR) 40 MG tablet, Take 40 mg by mouth Daily., Disp: , Rfl:   •  Tiotropium Bromide-Olodaterol (STIOLTO RESPIMAT IN), Inhale 2 puffs Daily., Disp: , Rfl:   •  triamterene-hydrochlorothiazide (DYAZIDE) 37.5-25 MG per capsule, Take 1 capsule by mouth Every Morning., Disp: , Rfl: 1  MEDICATION LIST AND ALLERGIES REVIEWED.    Family History   Problem Relation Age of Onset   • Heart attack Mother    • Uterine cancer Mother 84   • Lung cancer Brother    • Colon cancer Half-Sister 50   • Colon cancer Paternal Aunt    • Colon cancer Cousin    • Heart attack Brother    • Hypertension Daughter    • Hypertension Son      Social History     Tobacco Use   • Smoking status: Current Every Day Smoker     Packs/day: 1.00     Years: 30.00     Pack years: 30.00     Types:  "Cigarettes   • Smokeless tobacco: Never Used   Substance Use Topics   • Alcohol use: No     Frequency: Never   • Drug use: No     Social History     Social History Narrative        Has two children    Works as a  at [employer]    Continues to smoke at least 1 pack a day as she has her adult life    Denies regular alcohol use     FAMILY AND SOCIAL HISTORY REVIEWED.    Review of Systems  ALSO REFER TO SCANNED ROS SHEET FROM SAME DATE.    /70 (BP Location: Left arm, Patient Position: Sitting, Cuff Size: Adult)   Pulse 89   Temp 98.4 °F (36.9 °C)   Resp 18   Ht 162.6 cm (64.02\")   Wt 65.4 kg (144 lb 4 oz)   LMP  (LMP Unknown)   SpO2 94% Comment: room air at rest  BMI 24.74 kg/m²   Physical Exam  Vitals signs and nursing note reviewed.   Constitutional:       General: She is not in acute distress.     Appearance: She is well-developed. She is not diaphoretic.   HENT:      Head: Normocephalic and atraumatic.   Neck:      Thyroid: No thyromegaly.   Cardiovascular:      Rate and Rhythm: Normal rate and regular rhythm.      Heart sounds: Normal heart sounds. No murmur.   Pulmonary:      Effort: Pulmonary effort is normal.      Breath sounds: No stridor.      Comments: Few scattered rhonchi that clear partially but not completely with cough  Abdominal:      General: Bowel sounds are normal.      Palpations: Abdomen is soft.   Musculoskeletal: Normal range of motion.      Right lower leg: No edema.      Left lower leg: No edema.   Lymphadenopathy:      Cervical: No cervical adenopathy.      Upper Body:      Right upper body: No supraclavicular or epitrochlear adenopathy.      Left upper body: No supraclavicular or epitrochlear adenopathy.   Skin:     General: Skin is warm and dry.      Findings: No lesion.   Neurological:      Mental Status: She is alert and oriented to person, place, and time.   Psychiatric:         Behavior: Behavior normal.         Results     CT angiogram reviewed on " PACS.  Bulky bilateral parenchymal masses with at least 1 cavitary mass.  New in comparison to previous CT scan of the chest from 2019    Problem List       ICD-10-CM ICD-9-CM   1. Bilateral lung masses (new from 2019)  R91.8 786.6   2. Chronic GERD  K21.9 530.81   3. Endometrial cancer (CMS/HCC)  C54.1 182.0   4. Tobacco abuse  Z72.0 305.1   5. Submassive hemoptysis  R04.2 786.30       Discussion     We reviewed her most recent chest imaging.  She has bilateral lung lesions most consistent with metastatic cancer.  I think that this is unlikely to be primary bronchogenic carcinoma.  Could be infectious, such as due to a fungal infection but she does not appear to be clinically sick enough for that.    I think the most expedient and safest way to establish a diagnosis would be obtaining a CT directed transbronchial needle aspiration.  Several of these lesions are pleural-based and should be amenable to CT FNA.  I contacted Dr. Huggins who is going to arrange for her to have a CT FNA next week  We will have her hold her aspirin prior to the procedure.    Further work-up and/or treatment based on the results of the above    David Boyd MD  Note electronically signed    CC: Sharon Acosta,

## 2020-11-10 NOTE — H&P (VIEW-ONLY)
PULMONARY  NOTE    Chief Complaint     Tobacco abuse, chronic cough, abnormal CT scan of the chest, hemoptysis, endometrial cancer    History of Present Illness     70-year-old white female returns today for follow-up for an abnormal CT scan of the chest    I had originally seen her on 10/25/2019 for preoperative evaluation.  She was diagnosed with endometrial cancer.  At that time she had chest imaging which revealed no suspicious intrathoracic lesions.    She has developed hemoptysis.  This is consisted of pink or blood-streaked sputum.  She underwent repeat chest imaging which now reveals bilateral bulky parenchymal lesions some of which are cavitated.  She has been referred back to our office.    She has a history of ongoing tobacco abuse with no plans for smoking cessation    She has a history of reflux for which she intermittently takes Nexium    She has noted no lower extremity edema.  She has a history of a cardiac murmur    Patient Active Problem List   Diagnosis   • Heart murmur   • Dyspnea on exertion   • Cardiomegaly   • Lower extremity edema   • Tobacco abuse   • Chronic GERD   • Chronic obstructive lung disease (CMS/HCC)   • Neuropathy   • Nicotine dependence   • Systolic murmur   • Type 2 diabetes mellitus without complication (CMS/HCC)   • Achalasia of esophagus   • Endometrial cancer (CMS/HCC)   • Anxiety and depression   • URI with cough and congestion   • Dyslipidemia   • Bilateral lung masses (new from 2019)   • Submassive hemoptysis     Allergies   Allergen Reactions   • Augmentin [Amoxicillin-Pot Clavulanate] GI Intolerance   • Biaxin [Clarithromycin] Hallucinations       Current Outpatient Medications:   •  albuterol sulfate  (90 Base) MCG/ACT inhaler, Inhale 2 puffs Every 4 (Four) Hours As Needed for Wheezing., Disp: , Rfl:   •  aspirin 81 MG EC tablet, Take 81 mg by mouth Daily., Disp: , Rfl:   •  Aspirin-Acetaminophen-Caffeine (EXCEDRIN PO), Take  by mouth As Needed., Disp: , Rfl:    •  DULoxetine (CYMBALTA) 60 MG capsule, TAKE 1 CAPSULE BY MOUTH ONCE DAILY FOR 90 DAYS, Disp: , Rfl: 1  •  esomeprazole (nexIUM) 40 MG capsule, Take 40 mg by mouth Every Morning Before Breakfast., Disp: , Rfl:   •  ferrous sulfate 324 (65 Fe) MG tablet delayed-release EC tablet, Take 324 mg by mouth Daily With Breakfast., Disp: , Rfl:   •  gabapentin (NEURONTIN) 300 MG capsule, Take 300 mg by mouth 3 (Three) Times a Day., Disp: , Rfl: 0  •  ibuprofen (ADVIL,MOTRIN) 200 MG tablet, Take 200 mg by mouth 2 (Two) Times a Day As Needed for Mild Pain ., Disp: , Rfl:   •  meloxicam (MOBIC) 7.5 MG tablet, Take 1 tablet by mouth As Needed., Disp: , Rfl:   •  metFORMIN (GLUCOPHAGE) 1000 MG tablet, Take 1,000 mg by mouth 2 (Two) Times a Day., Disp: , Rfl: 1  •  nystatin (MYCOSTATIN) 106450 UNIT/GM powder, Apply  topically to the appropriate area as directed 3 (Three) Times a Day., Disp: 30 g, Rfl: 1  •  oxybutynin XL (DITROPAN-XL) 5 MG 24 hr tablet, Take 1 tablet by mouth Daily., Disp: 30 tablet, Rfl: 11  •  oxyCODONE (ROXICODONE) 5 MG immediate release tablet, Take 1 tablet by mouth Every 4-6 Hours As Needed for Moderate Pain ., Disp: 10 tablet, Rfl: 0  •  simvastatin (ZOCOR) 40 MG tablet, Take 40 mg by mouth Daily., Disp: , Rfl:   •  Tiotropium Bromide-Olodaterol (STIOLTO RESPIMAT IN), Inhale 2 puffs Daily., Disp: , Rfl:   •  triamterene-hydrochlorothiazide (DYAZIDE) 37.5-25 MG per capsule, Take 1 capsule by mouth Every Morning., Disp: , Rfl: 1  MEDICATION LIST AND ALLERGIES REVIEWED.    Family History   Problem Relation Age of Onset   • Heart attack Mother    • Uterine cancer Mother 84   • Lung cancer Brother    • Colon cancer Half-Sister 50   • Colon cancer Paternal Aunt    • Colon cancer Cousin    • Heart attack Brother    • Hypertension Daughter    • Hypertension Son      Social History     Tobacco Use   • Smoking status: Current Every Day Smoker     Packs/day: 1.00     Years: 30.00     Pack years: 30.00     Types:  "Cigarettes   • Smokeless tobacco: Never Used   Substance Use Topics   • Alcohol use: No     Frequency: Never   • Drug use: No     Social History     Social History Narrative        Has two children    Works as a  at [employer]    Continues to smoke at least 1 pack a day as she has her adult life    Denies regular alcohol use     FAMILY AND SOCIAL HISTORY REVIEWED.    Review of Systems  ALSO REFER TO SCANNED ROS SHEET FROM SAME DATE.    /70 (BP Location: Left arm, Patient Position: Sitting, Cuff Size: Adult)   Pulse 89   Temp 98.4 °F (36.9 °C)   Resp 18   Ht 162.6 cm (64.02\")   Wt 65.4 kg (144 lb 4 oz)   LMP  (LMP Unknown)   SpO2 94% Comment: room air at rest  BMI 24.74 kg/m²   Physical Exam  Vitals signs and nursing note reviewed.   Constitutional:       General: She is not in acute distress.     Appearance: She is well-developed. She is not diaphoretic.   HENT:      Head: Normocephalic and atraumatic.   Neck:      Thyroid: No thyromegaly.   Cardiovascular:      Rate and Rhythm: Normal rate and regular rhythm.      Heart sounds: Normal heart sounds. No murmur.   Pulmonary:      Effort: Pulmonary effort is normal.      Breath sounds: No stridor.      Comments: Few scattered rhonchi that clear partially but not completely with cough  Abdominal:      General: Bowel sounds are normal.      Palpations: Abdomen is soft.   Musculoskeletal: Normal range of motion.      Right lower leg: No edema.      Left lower leg: No edema.   Lymphadenopathy:      Cervical: No cervical adenopathy.      Upper Body:      Right upper body: No supraclavicular or epitrochlear adenopathy.      Left upper body: No supraclavicular or epitrochlear adenopathy.   Skin:     General: Skin is warm and dry.      Findings: No lesion.   Neurological:      Mental Status: She is alert and oriented to person, place, and time.   Psychiatric:         Behavior: Behavior normal.         Results     CT angiogram reviewed on " PACS.  Bulky bilateral parenchymal masses with at least 1 cavitary mass.  New in comparison to previous CT scan of the chest from 2019    Problem List       ICD-10-CM ICD-9-CM   1. Bilateral lung masses (new from 2019)  R91.8 786.6   2. Chronic GERD  K21.9 530.81   3. Endometrial cancer (CMS/HCC)  C54.1 182.0   4. Tobacco abuse  Z72.0 305.1   5. Submassive hemoptysis  R04.2 786.30       Discussion     We reviewed her most recent chest imaging.  She has bilateral lung lesions most consistent with metastatic cancer.  I think that this is unlikely to be primary bronchogenic carcinoma.  Could be infectious, such as due to a fungal infection but she does not appear to be clinically sick enough for that.    I think the most expedient and safest way to establish a diagnosis would be obtaining a CT directed transbronchial needle aspiration.  Several of these lesions are pleural-based and should be amenable to CT FNA.  I contacted Dr. Huggins who is going to arrange for her to have a CT FNA next week  We will have her hold her aspirin prior to the procedure.    Further work-up and/or treatment based on the results of the above    David Boyd MD  Note electronically signed    CC: Sharon Acosta,

## 2020-11-12 NOTE — TELEPHONE ENCOUNTER
Left generic message asking pt to call [facility]  [phone] and specifically stating that we wanted to ensure that she had stopped taking aspirin prior to procedure.

## 2020-11-13 NOTE — OUTREACH NOTE
"Patient Outreach Note    SW received a call from pt stating that yes she still needs a ride to her appt on Monday at 9am. SW scheduled pt a ride to come and pick her up at 8:20am. SW told the pt to have the staff call for a return ride home after her appt. Pt stated, \"Ok thank you for your help\".    VICKIE De La Torre  Ambulatory     11/13/2020, 16:16 EST      "

## 2020-11-13 NOTE — OUTREACH NOTE
"Care Coordination Note    SW reached out to Amara, Ref Coordinator for Pulmonology. Amara stated, \"we have a patient that is having a procedure on Monday morning and has no one to bring her and we were wondering if we can get her a Lyft ride\". MANNY told Amara that sometimes it can be hard to find Lyft drivers early in the mornings but that we can definitely schedule the ride for this pt.     VICKIE De La Torre  Ambulatory     11/13/2020, 15:13 EST      "

## 2020-11-13 NOTE — OUTREACH NOTE
Patient Outreach Note    SW reached out to the pt to follow up regarding the Lyft ride. No answer. VICKIE Jaimes  Ambulatory     11/13/2020, 15:15 EST

## 2020-11-13 NOTE — OUTREACH NOTE
Care Coordination Note    SW received a call from MANNY De La Garza in [facility], requesting that I call Amara, referral coordinator in Pulmonology Clinic regarding a patient needing a Lyft ride.     VICKIE De La Torre  Ambulatory     11/13/2020, 14:55 EST

## 2020-11-16 NOTE — NURSING NOTE
Patient placed on cardiac and EtCO2 monitors, vitals stable. Images taken and reviewed by Dr. Huggins. A left lung biopsy sample taken and sent to lab, patient tolerated well. Medications given as directed. Report to LUIS.

## 2020-11-16 NOTE — NURSING NOTE
Pt discharged from ir dept s/p lung biopsy.  Pt tolerated procedure without complications.  Post procedure cxr was negative for pneumothorax.  Discharge instructions reviewed with patient who verbalized understanding.  Pt transported to exit via wheelchair to meet with her  who is picking her up.

## 2020-11-16 NOTE — POST-PROCEDURE NOTE
An immediate patient assessment was done prior to the administration of moderate and/or deep conscious sedation.      Ele Strickland      Pre-op Diagnosis:   Endometrial cancer with multiple bilateral lung masses with hemoptysis; tobacco abuse  Post-op diagnosis:  Same      Anesthesia: Moderate sedation    ASA SCALE ASSESSMENT:  2-Mild to moderate systemic disease, medically well controlled, with no functional limitation    MALLAMPATI CLASSIFICATION:  2-Able to visualize the soft palate, fauces, uvula. The anterior & posterior tonsilar pillars are hidden by the tongue.    Staff:   Tad Huggins MD      Estimated Blood Loss: Trace    Urine Voided: * No values recorded between 11/16/2020 12:00 AM and 11/16/2020 12:30 PM *    Specimens:                18 gauge core X 5      Drains:  None    Findings: Multiple bilateral lung masses    Complications: No immediate      Tad Huggins MD     Date: 11/16/2020  Time: 12:30 EST

## 2020-11-24 PROBLEM — R63.4 WEIGHT LOSS: Status: ACTIVE | Noted: 2020-01-01

## 2020-11-24 PROBLEM — J06.9 URI WITH COUGH AND CONGESTION: Status: RESOLVED | Noted: 2020-02-25 | Resolved: 2020-01-01

## 2020-11-24 PROBLEM — C78.00 METASTASIS TO LUNG (HCC): Status: ACTIVE | Noted: 2020-01-01

## 2020-11-24 PROBLEM — F43.20 DISTURBANCE IN EMOTION: Status: ACTIVE | Noted: 2020-01-01

## 2020-11-24 NOTE — PROGRESS NOTES
Ele Strickland  [id number]  1950      Reason for visit: History of endometrial cancer, now with biopsy-proven lung metastasis  Original referring provider:  Kaylen Taylor DO     History of present illness:  The patient is a 70 y.o. year old female who presents today for treatment and evaluation of the above issues.    Patient underwent definitive radiation for her stage II grade 2 endometrioid adenocarcinoma.  Patient was seen on 2020 by Dr. Yogi Boyd due to complaints of hemoptysis.  She had undergone chest x-ray as a part of her preop evaluation in  without lesions noted.  She had a subsequent CT scan which showed likely inflammatory changes.  She has longstanding history of tobacco abuse.  She underwent CT scan 10/30/2020 and imaging of the abdomen and pelvis was essentially negative for metastatic disease.  However, CT scan of the chest showed multiple lesions and hilar adenopathy.  Patient underwent CT-guided biopsy of lung lesion and pathology showed recurrent endometrial cancer.    Today, patient notes multiple symptoms.  These include new onset cough and hemoptysis as detailed above.  This is continued.  She has had about a 30 pound weight loss per her report.  On review of the records, she was 174# in January of this year.  She notes poor appetite although she is trying to eat and has started using Ensure.  She is out of bed all day and works full-time.  She notes fatigue.  She has not told her daughter about her cancer recurrence and is unsure how to proceed with this.   She notes pre-existing neuropathy and uses Neurontin 300 mg usually on a twice daily basis.  She has carpal tunnel bilateral hands, right greater than left.  She has spinal stenosis and tingling in both feet.  She presents for discussion of further treatment options.    OBGYN History:  She is a .  She does not currently use HRT, but was briefly on progesterone around the time that she went through menopause. She  does not have a history of abnormal pap smears.      Oncologic History:  Oncology/Hematology History   Endometrial cancer (CMS/HCC)   10/23/2019 Initial Diagnosis    Patient referred to Gyn Oncology due to postmenopausal bleeding x 10 months and pap smear showing atypical glandular cells with negative HPV.   Endometrial biopsy obtained. Pathology revealed grade 2 endometrioid adenocarcinoma.      11/11/2019 Imaging    Pre-op CT abdomen pelvis showed lobular hypoenhancing mass in the uterus and the cervix, presumably known neoplasm. Incidental right lobe liver hemangioma noted, unchanged from previous imaging. No evidence of metastatic disease.      11/15/2019 Surgery    Type 1 radical RTLH/BSO, lysis of adhesions, and bilateral pelvic lymph node dissection.     Final pathology showed 9.5 x 6.0 x 2.5 cm grade 2 EAC with focal mucinous and secretory features, invasive into >90% of the myometrium. Tumor invades cervical stroma. No lymphvascular invasion and nodes negative.  MSI by IHC showed absent MLH1 and PMS2, reflex hypermethylation positive = sporadic tumor.  Stage II grade 2     1/24/2020 - 3/11/2020 Radiation    Adjuvant external beam radiation plus vaginal brachytherapy.  The pelvis received a dose of 45 Gy in 25 fractions, followed by a boost to the apex of the vagina with 12 Gy in 2 fractions.  Treatments tolerated well.      5/7/2020 Survivorship    Survivorship Care Plan completed and discussed with patient.  Copy of Survivorship Care Plan provided to patient and primary care provider.           Past Medical History:   Diagnosis Date   • Achalasia, esophageal    • Anxiety and depression    • Arthritis    • Chronic back pain    • Chronic bronchitis (CMS/HCC)    • COPD (chronic obstructive pulmonary disease) (CMS/HCC)     no home O2   • Diabetes (CMS/HCC)    • Endometrial cancer (CMS/HCC) 11/2019    Stage II   • Essential hypertension 7/5/2019   • Frequent urination    • Gall stones    • GERD  (gastroesophageal reflux disease)    • Heart murmur    • High blood pressure    • History of brachytherapy 03/11/2020    vagina   • History of prolapse of bladder    • History of radiation therapy 03/03/2020    Pelvis   • Pinched nerve     back   • Wears dentures    • Wears glasses        Past Surgical History:   Procedure Laterality Date   • COLONOSCOPY  01/2020   • ENDOSCOPY     • EPIDURAL  02/2020   • HELLER MYOTOMY LAPAROSCOPIC WITH ENDOSCOPY  2012   • TOTAL LAPAROSCOPIC HYSTERECTOMY SALPINGO OOPHORECTOMY Bilateral 11/15/2019    Procedure: TYPE 1 RADICAL TOTAL LAPAROSCOPIC HYSTERECTOMY BILATERAL SALPINGOOPHORECTOMY, LYSIS OF ADHESTIONS, BILATERAL PELVIC LYMPH NODE DISSECTION WITH DAVINCI ROBOT;  Surgeon: Zuly Evans MD;  Location: [facility];  Service: DaVinci   • UPPER GASTROINTESTINAL ENDOSCOPY  01/2020       MEDICATIONS: The current medication list was reviewed with the patient and updated in the EMR this date per the Medical Assistant. Medication dosages and frequencies were confirmed to be accurate.      Allergies:  is allergic to augmentin [amoxicillin-pot clavulanate] and biaxin [clarithromycin].    Social History:   Social History     Socioeconomic History   • Marital status:      Spouse name: Not on file   • Number of children: 2   • Years of education: Not on file   • Highest education level: Not on file   Tobacco Use   • Smoking status: Current Every Day Smoker     Packs/day: 1.00     Years: 30.00     Pack years: 30.00     Types: Cigarettes   • Smokeless tobacco: Never Used   Substance and Sexual Activity   • Alcohol use: No     Frequency: Never   • Drug use: No   • Sexual activity: Defer   Social History Narrative        Has two children    Works as a  at [employer]    Continues to smoke at least 1 pack a day as she has her adult life    Denies regular alcohol use       Family History:    Family History   Problem Relation Age of Onset   • Heart attack Mother    •  Uterine cancer Mother 84   • Lung cancer Brother    • Colon cancer Half-Sister 50   • Colon cancer Paternal Aunt    • Colon cancer Cousin    • Heart attack Brother    • Hypertension Daughter    • Hypertension Son        Health Maintenance:    Health Maintenance   Topic Date Due   • MAMMOGRAM  1950   • URINE MICROALBUMIN  1950   • ANNUAL PHYSICAL  04/02/1953   • TDAP/TD VACCINES (1 - Tdap) 04/02/1969   • Pneumococcal Vaccine 65+ (1 of 1 - PPSV23) 04/02/2015   • ZOSTER VACCINE (2 of 3) 07/25/2016   • HEPATITIS C SCREENING  10/23/2019   • DIABETIC FOOT EXAM  10/23/2019   • DIABETIC EYE EXAM  10/23/2019   • HEMOGLOBIN A1C  05/14/2020   • INFLUENZA VACCINE  08/01/2020   • LUNG CANCER SCREENING  10/30/2021   • COLONOSCOPY  01/21/2030       Review of Systems   Constitutional: Positive for appetite change (poor appetite), fatigue and unexpected weight change. Negative for chills and fever.   HENT: Negative for ear discharge, ear pain, hearing loss, mouth sores, nosebleeds, postnasal drip, rhinorrhea, sinus pressure, sinus pain, sore throat, tinnitus and trouble swallowing.    Eyes: Positive for itching and visual disturbance (blurred vision). Negative for pain.   Respiratory: Positive for cough and shortness of breath. Negative for wheezing.    Cardiovascular: Negative for chest pain and palpitations.        Heart murmur   Gastrointestinal: Negative for abdominal pain, blood in stool, constipation, diarrhea, nausea and vomiting.        Heartburn   Endocrine: Negative for heat intolerance (no hot flashes).   Genitourinary: Negative for difficulty urinating, flank pain, frequency, hematuria and urgency.        History of urinary and fecal incontinence   Musculoskeletal: Positive for arthralgias (bilateral shoulders), back pain (pinched nerve), gait problem (limping), joint swelling and myalgias.   Skin: Negative for color change, rash and wound.   Allergic/Immunologic: Negative for immunocompromised state.  "  Neurological: Positive for numbness. Negative for dizziness, seizures, syncope, weakness and headaches.   Hematological: Negative for adenopathy. Bruises/bleeds easily.   Psychiatric/Behavioral: Positive for dysphoric mood. Negative for agitation, confusion and sleep disturbance. The patient is not nervous/anxious.    All other systems reviewed and are negative.      Physical Exam    Vitals:    11/24/20 1347   BP: 138/67   Pulse: 85   Resp: 15   Temp: 98.2 °F (36.8 °C)   TempSrc: Temporal   Weight: 62.3 kg (137 lb 6.4 oz)   Height: 162.6 cm (64.02\")   PainSc:   3     Body mass index is 23.57 kg/m².    Wt Readings from Last 3 Encounters:   11/24/20 62.3 kg (137 lb 6.4 oz)   11/16/20 64.3 kg (141 lb 12.8 oz)   11/10/20 65.4 kg (144 lb 4 oz)         GENERAL: Alert, tired-appearing thin female appearing her stated age who is in no apparent distress.   HEENT: Sclera anicteric. Head normocephalic, atraumatic. Mucus membranes moist.   NECK: Trachea midline, supple, without masses.  No thyromegaly.   BREASTS: Deferred  CARDIOVASCULAR: Normal rate, regular rhythm,+ systolic ejection murmur, no rubs, or gallops.  No peripheral edema.  RESPIRATORY: Clear to auscultation bilaterally, normal respiratory effort  BACK:  No CVA tenderness, no vertebral tenderness on palpation  GASTROINTESTINAL:  Abdomen is soft, non-tender, non-distended, no rebound or guarding, no masses, or hernias. No HSM.  SKIN:  Warm, dry, well-perfused.  All visible areas intact.  No rashes, lesions, ulcers.  Mild venous stasis bilateral lower extremities.  PSYCHIATRIC: AO x3, with appropriate affect, normal thought processes.  NEUROLOGIC: No focal deficits.  Moves extremities well.  MUSCULOSKELETAL: Hesitant gait, shifting station.  EXTREMITIES:   No cyanosis, clubbing, symmetric.  LYMPHATICS:  No cervical or inguinal adenopathy noted.     PELVIC exam: Deferred.    ECOG PS 1    PROCEDURES: None      Diagnostic Data:   Personally reviewed CT imaging of " chest from 10/30/2020.  There are multiple lesions consistent with metastatic disease.  There is adenopathy in the mediastinal area as well.  Findings are most consistent with metastatic disease.    Ct Chest With Contrast    Result Date: 10/30/2020  New extensive bilateral large pulmonary nodules and masses measuring up to almost 5 cm and some demonstrating central cavitation, with additional component of bulky conglomerate pathologic mediastinal and bilateral hilar adenopathy. Findings favored to represent metastatic disease over diffuse cavitary infectious process. Appearance would be somewhat atypical for patient's stated history of endometrial carcinoma. No evidence of recurrent or metastatic disease in the abdomen and pelvis.  Nonspecific but somewhat asymmetric thickening of the mid to distal thoracic esophagus. If there is concern for esophageal primary, consider upper endoscopy.   This report was finalized on 10/30/2020 1:42 PM by Lucas Quintero.      Ct Abdomen Pelvis With Contrast    Result Date: 10/30/2020  New extensive bilateral large pulmonary nodules and masses measuring up to almost 5 cm and some demonstrating central cavitation, with additional component of bulky conglomerate pathologic mediastinal and bilateral hilar adenopathy. Findings favored to represent metastatic disease over diffuse cavitary infectious process. Appearance would be somewhat atypical for patient's stated history of endometrial carcinoma. No evidence of recurrent or metastatic disease in the abdomen and pelvis.  Nonspecific but somewhat asymmetric thickening of the mid to distal thoracic esophagus. If there is concern for esophageal primary, consider upper endoscopy.   This report was finalized on 10/30/2020 1:42 PM by Lucas Quintero.      Xr Chest 1 View    Result Date: 11/17/2020  Status post biopsy without postprocedure pneumothorax.  D:  11/16/2020 E:  11/16/2020  This report was finalized on 11/17/2020 8:26 AM by   Mark Che.      Ct Needle Biopsy Lung    Result Date: 11/16/2020   CT-guided biopsy of left side lung mass.  Plan:  Specimen(s) sent for evaluation. _______________________________________________________________  PROCEDURE SUMMARY: - Percutaneous CT-guided left side lung mass biopsy - Additional procedure(s): None  PROCEDURE DETAILS:  Pre-procedure Reference imaging for biopsy target: CT chest 10/30/2020 Consent: Informed consent for the procedure including risks, benefits and alternatives was obtained and time-out was performed prior to the procedure. Preparation: The site was prepared and draped using maximal sterile barrier technique including cutaneous antisepsis.  Anesthesia/sedation Level of anesthesia/sedation: Moderate sedation (conscious sedation) Anesthesia/sedation administered by: Independent trained observer under attending supervision with continuous monitoring of the patient?s level of consciousness and physiologic status Total intra-service sedation time (minutes): 18  Imaging prior to biopsy The patient was positioned supine. Initial imaging was performed using CT. Biopsy target: - Maximal diameter (cm): 4.5 - Location: Left lower lobe lung Other findings: None  Biopsy Local anesthesia was administered. Under CT guidance, the biopsy needle was advanced to the target and biopsy was performed. Coaxial needle: 17 gauge Core needle biopsy device: Movable Core needle size: 18 gauge Number of core specimens: 5  Fine needle aspiration device: Not applicable Fine needle size: Not applicable Number of FNA specimens: Not applicable  On-site biopsy touch preparation: None  Additional sampling recommendations: None Preliminary assessment of sample adequacy: Not applicable  Needle removal The biopsy needle was removed and a sterile dressing was applied. Tract embolization: None  Imaging following biopsy Immediate post-biopsy imaging was performed using CT fluoroscopy. Post-biopsy imaging findings: No  immediate complication  Contrast Contrast agent: None Contrast volume (mL): 0  Radiation Dose CT dose length product (mGy-cm): 462  Additional Details Additional description of procedure: None Equipment details: None Specimens removed: Biopsy samples as detailed above Estimated blood loss (mL): Less than 10 Standardized report: BiopsyCT  Attestation I was present and scrubbed for the entire procedure. Imaging reviewed. Agree with final report as written.  This report was finalized on 11/16/2020 12:40 PM by Tad Huggins.          Lab Results   Component Value Date    WBC 10.61 11/16/2019    HGB 10.0 (L) 11/16/2019    HCT 32.7 (L) 11/16/2019    MCV 84.1 11/16/2019     11/16/2020    NEUTROABS 7.69 (H) 11/16/2019    GLUCOSE 141 (H) 11/16/2019    BUN 11 11/16/2019    CREATININE 0.60 10/30/2020    EGFRIFNONA 99 11/16/2019     11/16/2019    K 3.9 11/16/2019     11/16/2019    CO2 31.0 (H) 11/16/2019    CALCIUM 9.2 11/16/2019    ALBUMIN 4.30 11/14/2019    AST 15 11/14/2019    ALT 11 11/14/2019    BILITOT 0.3 11/14/2019     No results found for:         Assessment/Plan   This is a 70 y.o. woman with biopsy-proven recurrent endometrial cancer with lung metastasis and mediastinal adenopathy.  Encounter Diagnoses   Name Primary?   • Endometrial cancer (CMS/HCC) Yes   • Malignant neoplasm metastatic to lung, unspecified laterality (CMS/HCC)    • Weight loss    • Tobacco abuse    • Anxiety and depression    • Disturbance in emotion        History of endometrial cancer as detailed above, now with recurrence  Tumor to be sent for genetic profiling if patient would like to pursue treatment.  Treatment options of palliation versus cytotoxic chemotherapy with combined carboplatin at AUC of 6+ paclitaxel at 175 mg/m² IV q. 21 days x 6 with consideration of hormone maintenance was discussed.  Patient lives in [place].  Options were reviewed.  This included chemotherapy with combined carboplatin at AUC of 6+  paclitaxel at 175 mg/m² IV q. 21 days, hormonal therapy, immunotherapy, and palliative measures with referral to palliative care and eventually hospice.  At this point, patient has a good performance status and works full-time.  She has had a dramatic weight loss to about 40 pounds this year.  She is undergoing nutritional supplementation.  We reviewed the inherent side effects of Carboplatin/Paclitaxel chemotherapy, including but not limited to: bone marrow suppression, peripheral neuropathy, fatigue, alopecia, constipation, and less commonly nausea/vomiting, allergic reaction, extravasation.   IV administration options for chemotherapy/immunotherapy were discussed.  This included peripheral IV, PICC line, and Port-A-Cath placement.  Risks and benefits of these options were reviewed.  Pre-existing comorbidities such as neuropathy were discussed in the context of risks associated with treatment.    Patient verbalizes emotional distress regarding her diagnosis.  She declined referral for psychologic counseling.  She was offered referral for social work and financial counseling should she have questions about treatment and resources.  She declined this at this point.    She is to call back early next week with a decision regarding treatment versus palliative measures.  All questions were answered.    Pain assessment was performed today as a part of patient’s care.  For patients with pain related to surgery, gynecologic malignancy or cancer treatment, the plan is as noted in the assessment/plan.  For patients with pain not related to these issues, they are to seek any further needed care from a more appropriate provider, such as PCP.    No orders of the defined types were placed in this encounter.    FOLLOW UP: Patient to call    Zuly Evans MD  11/24/20  12:54 PM

## 2020-12-01 NOTE — TELEPHONE ENCOUNTER
I called patient and let her know we will get her chem plan in. Send it for authorization and get her scheduled for a chemo teach.    I also inquired if she wanted a port or a picc.     She has decided not do either and wants to proceed with a peripheral stick on her first chemo and see how that goes.

## 2020-12-01 NOTE — TELEPHONE ENCOUNTER
PATIENT CALLING    PATIENT WAS SUPPOSE TO CALL AND GIVE HER DECISION ON HER TREATMENT, SHE WANTS TO DO THE CHEMO, PLEASE ADVISE?    PT CALL BACK #[phone] LEAVE A MESSAGE

## 2020-12-16 NOTE — TELEPHONE ENCOUNTER
CALLING ABOUT PROGRESS ON GETTING SCHEDULED FOR CHEMO.    PT ALSO NEEDS TO GET AN OK OR NOT ON A STEROID EPIDURAL SHE WILL BE RECEIVING THIS Friday.    BEST C/B: [phone]

## 2020-12-17 NOTE — TELEPHONE ENCOUNTER
Left message for patient to call to be scheduled for chemo teach and infusion appointments. Also, per RN, patient can have injection tomorrow.

## 2020-12-18 NOTE — TELEPHONE ENCOUNTER
Scheduled with patient. She requested to start next week since she is having so much pain. Scheduled for chemo teach and 1st cyclee on 12/22/2020. MD/CAM notified.

## 2020-12-21 NOTE — PROGRESS NOTES
CHEMOTHERAPY EDUCATION    NAME:  Ele Strickland      : 1950           DATE: 20    Booklets Given: Chemotherapy and You []  Eating Hints []    Sexuality/Fertility Books []     Chemotherapy/Biotherapy Education Sheets: (list all that apply)  Low blood counts, Fatigue during chemotherapy, Nausea management, Constipation prevention, Stomatitis, Decreased appetite, Neuropathy                                                                                                                                                                Chemotherapy Regimen:   Treatment Plans     Name Type Plan Dates Plan Provider         Active    OP ENDOMETRIAL PACLitaxel / CARBOplatin (Q21D)  ONCOLOGY TREATMENT  2020 -  Zuly Evans MD                    TOPICS EDUCATION PROVIDED EDUCATION REINFORCED COMMENTS   ANEMIA:  role of RBC, cause, s/s, ways to manage, role of transfusion [x] [] Counseled on the role of RBC's,  s/s of anemia and role of blood transfusion.    THROMBOCYTOPENIA:  role of platelet, cause, s/s, ways to prevent bleeding, things to avoid, when to seek help [x] [] Counseled on the role of platelets.  Instructed on injury / fall precautions.    NEUTROPENIA:  role of WBC, cause, infection precautions, s/s of infection, when to call MD [x] [] Counseled on the role of WBC's and potential drops during treatment.  Gave examples of neutropenic precautions and gave information sheets on low blood counts.    NUTRITION & APPETITE CHANGES:  importance of maintaining healthy diet & weight, ways to manage to improve intake, dietary consult, exercise regimen [x] [] Informed that appetite may decrease during chemotherapy.  Advised several small meals throughout the day.    DIARRHEA:  causes, s/s of dehydration, ways to manage, dietary changes, when to call MD [x] []    CONSTIPATION:  causes, ways to manage, dietary changes, when to call MD [x] [] Counseled that this regimen can cause constipation.  Advised she take  Miralax if constipation begins and advised she call our office if she's gone 2 - 3 days.    NAUSEA & VOMITING:  cause, use of antiemetics, dietary changes, when to call MD [x] [] Counseled on taking anti-emetics.  Advised she call our office if prescribed medications do not control nausea or if she has vomiting.    MOUTH SORES:  causes, oral care, ways to manage [x] [] Advised on keeping mouth clean and moist, avoidance of alcohol based mouth rinses and gave information sheets on ways to manage.    ALOPECIA:  cause, ways to manage, resources [x] [] Informed that Taxol causes hair loss.  Gave her prescription for prosthetic cranial and list of local head covering facilities.     INFERTILITY & SEXUALITY:  causes, fertility preservation options, sexuality changes, ways to manage, importance of birth control [x] [] Advised condom use 28 hours after each chemo treatment.    NERVOUS SYSTEM CHANGES:  causes, s/s, neuropathies, cognitive changes, ways to manage [x] [] Pt already presents with mild neuropathy and takes gabapentin for this.  Advised she inform our office before each chemo treatment if she has any increase or worsening of neuropathy.    PAIN:  causes, ways to manage [x] [] ????   SKIN & NAIL CHANGES:  cause, s/s, ways to manage [x] [] Advised she use unscented, gentle lotions on her skin.     ORGAN TOXICITIES:  cause, s/s, need for diagnostic tests, labs, when to notify MD [x] [] Counseled on the possibily of chemo causing nephrotoxicity.   SURVIVORSHIP:  distress, distress assessment, secondary malignancies, early/late effects, follow-up, social issues, social support [x] [] Offered appointment with DIRK Vann Psych, APRN, here at [facility], should she have any social / emotional or mental health issues.  She currently declines.   HOME CARE:  use of spill kits, storing of PO chemo, how to manage bodily fluids [] []    MISCELLANEOUS:  drug interactions, administration, vesicant, et [] []      Referrals:    No  referrals placed at this time.    Notes:   45 minutes spent with pt.  All questions answered today.  Encouraged she call our office back with any questions or concerns.

## 2020-12-22 NOTE — PROGRESS NOTES
"Outpatient Oncology Nutrition     Reason for Visit:     Oncology Nutrition Screening, Patient Education and Follow Up Visit during patient initial chemotherapy infusion appointment.  Note patient was followed by my colleague Nikki Magallanes RD during her radiation treatments.    Patient Name:  Ele Strickland    :  1950    MRN:  [id number]    Date of Encounter: 2020    Nutrition Assessment     Cancer Dx: recurrent endometrial cancer with new lung metastasis     Type of Cancer Treatment:     Surgery: Hysterectomy, bilateral salpingo-oophorectomy and lymph node dissection (11/15/19)     Chemotherapy: Carbo / Taxol - every 21 days x 6 (cycle 1 today)     Radiation: 45 Gray to the pelvis and 12 Gy in 2 fractions utilizing iridium 192 and a vaginal cylinder to boost the apex of the vagina (20 - 3/11/20)    Patient Active Problem List:    Patient Active Problem List   Diagnosis   • Heart murmur   • Dyspnea on exertion   • Cardiomegaly   • Lower extremity edema   • Tobacco abuse   • Chronic GERD   • Chronic obstructive lung disease (CMS/HCC)   • Neuropathy   • Nicotine dependence   • Systolic murmur   • Type 2 diabetes mellitus without complication (CMS/HCC)   • Achalasia of esophagus   • Endometrial cancer (CMS/HCC)   • Anxiety and depression   • Dyslipidemia   • Metastasis to lung (CMS/HCC)   • Submassive hemoptysis   • Weight loss   • Disturbance in emotion       Food / Nutrition Related History   Patient states she has been drinking 1 Ensure daily and is eating ~2 times per day.    Hydration Status   Discussed the importance of hydration during treatment and reviewed good hydrating fluid options.    Goal: ~64 ounces    How many 8 ounces glasses of water do you consume per day? Patient reports drinking very little throughout the day.    Enteral Feeding       Anthropometric Measurements     Height:    Ht Readings from Last 1 Encounters:   20 162.6 cm (64\")     Weight:    Wt Readings from Last 1 " "Encounters:   12/22/20 59 kg (130 lb)     BMI:  22.3 - Normal    Weight Change: ~40# (23.5%) weight loss over the past ~ 5 months    Review of Lab Data (Time Frame - 1 month / 2 month)   Labs reviewed - 12/21/20 / hyperglycemia noted / patient with a history of diabetes managed with Metformin. Provided and reviewed written diet material \"Blood Glucose Management\" and informed her to the effects of steroids on blood sugars.    Medication Review   Meds noted - dexamethasone     Nutrition Focused Physical Findings   Muscle wasting noted    Nutrition Impact Symptoms      Altered appetite, Altered taste changes, Esophagitis, Fatigue, and Early satiety    Physical Activity      Not my normal self, but able to be up and about with fairly normal activities    Current Nutritional Intake     Oral diet:  Regular     Oral nutritional supplements: Ensure High Protein - 1/day    Intake: Patient reports her oral intake is less than normal    Malnutrition Risk Assessment     Recent weight loss over the past 6 months:  Yes    How much weight loss:  4 = 34 or more lbs    Eating poorly because of a decreased appetite:  1 = Yes    Malnutrition Screening Score:     MST = 2 more Patient at risk for malnutrition     Nutrition Diagnosis     Problem Severe chronic disease related malnutrition   Inadequate oral intake  Weight loss   Etiology Diagnosis of recurrent cancer  Nutritional impact symptoms as above   Signs / Symptoms Muscle wasting  40# (23.5%) weight loss      Nutrition Intervention   Discussed the importance of good nutrition during her treatment course focusing on adequate calorie, protein, nutrient and fluid intake.  Advised her to be consuming smaller more frequent meals/snacks throughout the day to aid with early satiety and potential nausea management.  Emphasized the importance of protein and its role in the diet; reviewed high protein foods; and recommended she have a protein source at each meal/snack.  Offered snack ideas " "she may find more appealing at this time.  Advised her to use the baking soda / salt mouth rinse before eating to aid with taste changes.  Discussed ONS and their role in the diet.  Suggested she switch to Boost Plus or Ensure Plus as those are more calorically dense - samples provided as well as coupons.  Also recommended she increase her consumption of ONS to 2 per day to aid with calorie and protein intake and weight maintenance.     Provided and reviewed several written diet materials \"Taste and Smell Changes\", \"Increasing Calories and Protein\", \"Soft and Moist High Protein Menu Ideas\", and \"Increasing Fluid Intake\" to reinforce information discussed.    Answered her questions and she voiced understanding of information discussed.      Goal   Improve nutritional intake and prevent additional weight loss  To aid with nutritional impact symptom management as needed  Attain hydration goals     Monitoring / Evaluation   RD's contact information provided and encouraged to call with questions.  Will monitor as needed during his treatment course.    Kalani Adame MS, RD, LD  "

## 2020-12-22 NOTE — PROGRESS NOTES
SW met with pt during her first infusion to address her distress screening.  Pt lives alone in [place] and she has four supportive children and their families living nearby.  Pt has not informed her children of her cancer diagnosis.  She is waiting until after [place] to do so.  Pt states that she has everything she needs at home currently.  SW provided support and a chemo goody bag to pt.  SW provided contact information and encouraged pt to call with any future needs or concerns.  SW available for ongoing support and resource needs.

## 2020-12-22 NOTE — TELEPHONE ENCOUNTER
Received phone call from Infusion nurse.    Patient is a terrible stick and it took 5 tries peripherally.     She needs a PORT a cath.     This was scheduled.     Chemo to be moved 01/12/2020 with port remaining accessed.   Appointment for 01/11/2020 canceled and rescheduled.

## 2021-01-01 ENCOUNTER — APPOINTMENT (OUTPATIENT)
Dept: GENERAL RADIOLOGY | Facility: HOSPITAL | Age: 71
End: 2021-01-01

## 2021-01-01 ENCOUNTER — APPOINTMENT (OUTPATIENT)
Dept: CT IMAGING | Facility: HOSPITAL | Age: 71
End: 2021-01-01

## 2021-01-01 ENCOUNTER — HOSPITAL ENCOUNTER (OUTPATIENT)
Dept: CT IMAGING | Facility: HOSPITAL | Age: 71
End: 2021-01-01

## 2021-01-01 ENCOUNTER — READMISSION MANAGEMENT (OUTPATIENT)
Dept: CALL CENTER | Facility: HOSPITAL | Age: 71
End: 2021-01-01

## 2021-01-01 ENCOUNTER — OFFICE VISIT (OUTPATIENT)
Dept: GYNECOLOGIC ONCOLOGY | Facility: CLINIC | Age: 71
End: 2021-01-01

## 2021-01-01 ENCOUNTER — HOSPITAL ENCOUNTER (INPATIENT)
Facility: HOSPITAL | Age: 71
LOS: 6 days | Discharge: HOME | End: 2021-05-01
Attending: EMERGENCY MEDICINE | Admitting: INTERNAL MEDICINE

## 2021-01-01 ENCOUNTER — APPOINTMENT (OUTPATIENT)
Dept: ONCOLOGY | Facility: HOSPITAL | Age: 71
End: 2021-01-01

## 2021-01-01 ENCOUNTER — HOSPITAL ENCOUNTER (OUTPATIENT)
Dept: ONCOLOGY | Facility: HOSPITAL | Age: 71
Setting detail: INFUSION SERIES
Discharge: HOME | End: 2021-04-07

## 2021-01-01 ENCOUNTER — TELEPHONE (OUTPATIENT)
Dept: GYNECOLOGIC ONCOLOGY | Facility: CLINIC | Age: 71
End: 2021-01-01

## 2021-01-01 ENCOUNTER — HOSPITAL ENCOUNTER (OUTPATIENT)
Dept: ONCOLOGY | Facility: HOSPITAL | Age: 71
Setting detail: INFUSION SERIES
Discharge: HOME | End: 2021-02-03

## 2021-01-01 ENCOUNTER — HOSPITAL ENCOUNTER (OUTPATIENT)
Dept: ONCOLOGY | Facility: HOSPITAL | Age: 71
Setting detail: INFUSION SERIES
Discharge: HOME | End: 2021-03-17

## 2021-01-01 ENCOUNTER — ANESTHESIA EVENT (OUTPATIENT)
Dept: GASTROENTEROLOGY | Facility: HOSPITAL | Age: 71
End: 2021-01-01

## 2021-01-01 ENCOUNTER — HOSPITAL ENCOUNTER (OUTPATIENT)
Dept: ONCOLOGY | Facility: HOSPITAL | Age: 71
Setting detail: INFUSION SERIES
Discharge: HOME | End: 2021-04-13

## 2021-01-01 ENCOUNTER — LAB (OUTPATIENT)
Dept: LAB | Facility: HOSPITAL | Age: 71
End: 2021-01-01

## 2021-01-01 ENCOUNTER — DOCUMENTATION (OUTPATIENT)
Dept: NUTRITION | Facility: HOSPITAL | Age: 71
End: 2021-01-01

## 2021-01-01 ENCOUNTER — TELEPHONE (OUTPATIENT)
Dept: SOCIAL WORK | Facility: HOSPITAL | Age: 71
End: 2021-01-01

## 2021-01-01 ENCOUNTER — HOSPITAL ENCOUNTER (OUTPATIENT)
Dept: ONCOLOGY | Facility: HOSPITAL | Age: 71
Setting detail: INFUSION SERIES
Discharge: HOME | End: 2021-02-25

## 2021-01-01 ENCOUNTER — HOSPITAL ENCOUNTER (OUTPATIENT)
Dept: ONCOLOGY | Facility: HOSPITAL | Age: 71
Setting detail: INFUSION SERIES
Discharge: HOME | End: 2021-02-24

## 2021-01-01 ENCOUNTER — OFFICE VISIT (OUTPATIENT)
Dept: RADIATION ONCOLOGY | Facility: HOSPITAL | Age: 71
End: 2021-01-01

## 2021-01-01 ENCOUNTER — HOSPITAL ENCOUNTER (OUTPATIENT)
Dept: CT IMAGING | Facility: HOSPITAL | Age: 71
Discharge: HOME | End: 2021-03-02
Admitting: OBSTETRICS & GYNECOLOGY

## 2021-01-01 ENCOUNTER — EDUCATION (OUTPATIENT)
Dept: ONCOLOGY | Facility: HOSPITAL | Age: 71
End: 2021-01-01

## 2021-01-01 ENCOUNTER — HOSPITAL ENCOUNTER (OUTPATIENT)
Dept: ONCOLOGY | Facility: HOSPITAL | Age: 71
Setting detail: INFUSION SERIES
Discharge: HOME | End: 2021-04-14

## 2021-01-01 ENCOUNTER — CLINICAL SUPPORT (OUTPATIENT)
Dept: GYNECOLOGIC ONCOLOGY | Facility: CLINIC | Age: 71
End: 2021-01-01

## 2021-01-01 ENCOUNTER — DOCUMENTATION (OUTPATIENT)
Dept: GYNECOLOGIC ONCOLOGY | Facility: CLINIC | Age: 71
End: 2021-01-01

## 2021-01-01 ENCOUNTER — HOSPITAL ENCOUNTER (OUTPATIENT)
Dept: RADIATION ONCOLOGY | Facility: HOSPITAL | Age: 71
Setting detail: RADIATION/ONCOLOGY SERIES
Discharge: HOME | End: 2021-01-15

## 2021-01-01 ENCOUNTER — OUTSIDE FACILITY SERVICE (OUTPATIENT)
Dept: GYNECOLOGIC ONCOLOGY | Facility: CLINIC | Age: 71
End: 2021-01-01

## 2021-01-01 ENCOUNTER — HOSPITAL ENCOUNTER (EMERGENCY)
Facility: HOSPITAL | Age: 71
Discharge: LEFT BEFORE DISCHARGE | End: 2021-01-04

## 2021-01-01 ENCOUNTER — TRANSCRIBE ORDERS (OUTPATIENT)
Dept: GENERAL RADIOLOGY | Facility: HOSPITAL | Age: 71
End: 2021-01-01

## 2021-01-01 ENCOUNTER — HOSPITAL ENCOUNTER (OUTPATIENT)
Dept: ONCOLOGY | Facility: HOSPITAL | Age: 71
Setting detail: INFUSION SERIES
Discharge: HOME | End: 2021-01-12

## 2021-01-01 ENCOUNTER — ANESTHESIA (OUTPATIENT)
Dept: GASTROENTEROLOGY | Facility: HOSPITAL | Age: 71
End: 2021-01-01

## 2021-01-01 ENCOUNTER — APPOINTMENT (OUTPATIENT)
Dept: PREADMISSION TESTING | Facility: HOSPITAL | Age: 71
End: 2021-01-01

## 2021-01-01 ENCOUNTER — HOSPITAL ENCOUNTER (OUTPATIENT)
Dept: ONCOLOGY | Facility: HOSPITAL | Age: 71
Setting detail: INFUSION SERIES
Discharge: HOME | End: 2021-01-04

## 2021-01-01 ENCOUNTER — HOSPITAL ENCOUNTER (OUTPATIENT)
Dept: GENERAL RADIOLOGY | Facility: HOSPITAL | Age: 71
Discharge: HOME | End: 2021-01-11

## 2021-01-01 ENCOUNTER — HOSPITAL ENCOUNTER (INPATIENT)
Facility: HOSPITAL | Age: 71
LOS: 2 days | Discharge: HOME | End: 2021-04-19
Attending: EMERGENCY MEDICINE | Admitting: INTERNAL MEDICINE

## 2021-01-01 ENCOUNTER — HOSPITAL ENCOUNTER (OUTPATIENT)
Dept: ONCOLOGY | Facility: HOSPITAL | Age: 71
Setting detail: INFUSION SERIES
Discharge: HOME | End: 2021-03-25

## 2021-01-01 VITALS — DIASTOLIC BLOOD PRESSURE: 50 MMHG | HEART RATE: 86 BPM | SYSTOLIC BLOOD PRESSURE: 149 MMHG

## 2021-01-01 VITALS
WEIGHT: 127 LBS | BODY MASS INDEX: 21.68 KG/M2 | RESPIRATION RATE: 15 BRPM | HEART RATE: 111 BPM | HEIGHT: 64 IN | OXYGEN SATURATION: 94 % | TEMPERATURE: 96.9 F | SYSTOLIC BLOOD PRESSURE: 175 MMHG | DIASTOLIC BLOOD PRESSURE: 93 MMHG

## 2021-01-01 VITALS
WEIGHT: 130 LBS | RESPIRATION RATE: 20 BRPM | HEART RATE: 63 BPM | HEIGHT: 64 IN | DIASTOLIC BLOOD PRESSURE: 51 MMHG | TEMPERATURE: 98.7 F | SYSTOLIC BLOOD PRESSURE: 138 MMHG | BODY MASS INDEX: 22.2 KG/M2

## 2021-01-01 VITALS
DIASTOLIC BLOOD PRESSURE: 64 MMHG | WEIGHT: 125 LBS | SYSTOLIC BLOOD PRESSURE: 151 MMHG | RESPIRATION RATE: 16 BRPM | TEMPERATURE: 97.7 F | HEIGHT: 64 IN | HEART RATE: 88 BPM | BODY MASS INDEX: 21.34 KG/M2

## 2021-01-01 VITALS
HEIGHT: 64 IN | HEART RATE: 91 BPM | TEMPERATURE: 96 F | RESPIRATION RATE: 20 BRPM | WEIGHT: 123 LBS | BODY MASS INDEX: 21 KG/M2 | OXYGEN SATURATION: 98 % | SYSTOLIC BLOOD PRESSURE: 168 MMHG | DIASTOLIC BLOOD PRESSURE: 77 MMHG

## 2021-01-01 VITALS
WEIGHT: 125.5 LBS | OXYGEN SATURATION: 94 % | HEART RATE: 86 BPM | RESPIRATION RATE: 16 BRPM | DIASTOLIC BLOOD PRESSURE: 72 MMHG | TEMPERATURE: 98 F | HEIGHT: 64 IN | SYSTOLIC BLOOD PRESSURE: 123 MMHG | BODY MASS INDEX: 21.43 KG/M2

## 2021-01-01 VITALS
RESPIRATION RATE: 16 BRPM | DIASTOLIC BLOOD PRESSURE: 61 MMHG | HEIGHT: 64 IN | WEIGHT: 125.8 LBS | HEART RATE: 78 BPM | BODY MASS INDEX: 21.48 KG/M2 | SYSTOLIC BLOOD PRESSURE: 135 MMHG | TEMPERATURE: 97.8 F

## 2021-01-01 VITALS
WEIGHT: 134 LBS | DIASTOLIC BLOOD PRESSURE: 59 MMHG | BODY MASS INDEX: 22.88 KG/M2 | HEART RATE: 78 BPM | HEIGHT: 64 IN | RESPIRATION RATE: 20 BRPM | SYSTOLIC BLOOD PRESSURE: 121 MMHG | TEMPERATURE: 96.8 F

## 2021-01-01 VITALS
DIASTOLIC BLOOD PRESSURE: 58 MMHG | BODY MASS INDEX: 22.66 KG/M2 | TEMPERATURE: 97.6 F | WEIGHT: 132 LBS | HEART RATE: 106 BPM | SYSTOLIC BLOOD PRESSURE: 123 MMHG

## 2021-01-01 VITALS
RESPIRATION RATE: 20 BRPM | SYSTOLIC BLOOD PRESSURE: 124 MMHG | HEIGHT: 64 IN | OXYGEN SATURATION: 94 % | WEIGHT: 128 LBS | DIASTOLIC BLOOD PRESSURE: 65 MMHG | BODY MASS INDEX: 21.85 KG/M2 | TEMPERATURE: 97.3 F | HEART RATE: 102 BPM

## 2021-01-01 VITALS
SYSTOLIC BLOOD PRESSURE: 91 MMHG | HEART RATE: 104 BPM | BODY MASS INDEX: 20.14 KG/M2 | WEIGHT: 118 LBS | TEMPERATURE: 97.5 F | RESPIRATION RATE: 20 BRPM | HEIGHT: 64 IN | DIASTOLIC BLOOD PRESSURE: 57 MMHG

## 2021-01-01 VITALS — HEART RATE: 95 BPM | DIASTOLIC BLOOD PRESSURE: 58 MMHG | SYSTOLIC BLOOD PRESSURE: 130 MMHG

## 2021-01-01 VITALS
HEART RATE: 105 BPM | TEMPERATURE: 98.4 F | BODY MASS INDEX: 23.39 KG/M2 | OXYGEN SATURATION: 98 % | WEIGHT: 137 LBS | HEIGHT: 64 IN | RESPIRATION RATE: 20 BRPM | DIASTOLIC BLOOD PRESSURE: 103 MMHG | SYSTOLIC BLOOD PRESSURE: 124 MMHG

## 2021-01-01 VITALS — DIASTOLIC BLOOD PRESSURE: 68 MMHG | SYSTOLIC BLOOD PRESSURE: 137 MMHG | HEART RATE: 90 BPM

## 2021-01-01 VITALS
WEIGHT: 120 LBS | DIASTOLIC BLOOD PRESSURE: 49 MMHG | RESPIRATION RATE: 18 BRPM | BODY MASS INDEX: 20.49 KG/M2 | HEIGHT: 64 IN | HEART RATE: 76 BPM | TEMPERATURE: 98.5 F | SYSTOLIC BLOOD PRESSURE: 129 MMHG

## 2021-01-01 VITALS — SYSTOLIC BLOOD PRESSURE: 150 MMHG | DIASTOLIC BLOOD PRESSURE: 75 MMHG | HEART RATE: 88 BPM

## 2021-01-01 VITALS
OXYGEN SATURATION: 92 % | HEIGHT: 64 IN | BODY MASS INDEX: 20.74 KG/M2 | RESPIRATION RATE: 18 BRPM | DIASTOLIC BLOOD PRESSURE: 77 MMHG | SYSTOLIC BLOOD PRESSURE: 163 MMHG | HEART RATE: 83 BPM | TEMPERATURE: 99.1 F | WEIGHT: 121.5 LBS

## 2021-01-01 DIAGNOSIS — Z45.2 ENCOUNTER FOR CARE RELATED TO VASCULAR ACCESS PORT: ICD-10-CM

## 2021-01-01 DIAGNOSIS — R63.4 WEIGHT LOSS: ICD-10-CM

## 2021-01-01 DIAGNOSIS — E86.0 DEHYDRATION: Primary | ICD-10-CM

## 2021-01-01 DIAGNOSIS — C54.1 ENDOMETRIAL CANCER (HCC): Primary | ICD-10-CM

## 2021-01-01 DIAGNOSIS — C54.1 ENDOMETRIAL CANCER (HCC): ICD-10-CM

## 2021-01-01 DIAGNOSIS — C78.00 MALIGNANT NEOPLASM METASTATIC TO LUNG, UNSPECIFIED LATERALITY (HCC): ICD-10-CM

## 2021-01-01 DIAGNOSIS — R53.1 GENERAL WEAKNESS: ICD-10-CM

## 2021-01-01 DIAGNOSIS — D50.0 BLOOD LOSS ANEMIA: ICD-10-CM

## 2021-01-01 DIAGNOSIS — T45.1X5A ANEMIA DUE TO ANTINEOPLASTIC CHEMOTHERAPY: Primary | ICD-10-CM

## 2021-01-01 DIAGNOSIS — D64.81 ANEMIA DUE TO ANTINEOPLASTIC CHEMOTHERAPY: ICD-10-CM

## 2021-01-01 DIAGNOSIS — R53.81 DEBILITY: ICD-10-CM

## 2021-01-01 DIAGNOSIS — D69.6 THROMBOCYTOPENIA (HCC): ICD-10-CM

## 2021-01-01 DIAGNOSIS — E11.9 TYPE 2 DIABETES MELLITUS WITHOUT COMPLICATION, WITHOUT LONG-TERM CURRENT USE OF INSULIN (HCC): Chronic | ICD-10-CM

## 2021-01-01 DIAGNOSIS — T45.1X5A ANTINEOPLASTIC CHEMOTHERAPY INDUCED ANEMIA: ICD-10-CM

## 2021-01-01 DIAGNOSIS — R53.83 CHEMOTHERAPY-INDUCED FATIGUE: ICD-10-CM

## 2021-01-01 DIAGNOSIS — T45.1X5A ANEMIA DUE TO ANTINEOPLASTIC CHEMOTHERAPY: ICD-10-CM

## 2021-01-01 DIAGNOSIS — C54.1 ENDOMETRIAL CANCER (HCC): Primary | Chronic | ICD-10-CM

## 2021-01-01 DIAGNOSIS — D64.9 ANEMIA, UNSPECIFIED TYPE: Primary | ICD-10-CM

## 2021-01-01 DIAGNOSIS — R53.1 WEAKNESS GENERALIZED: ICD-10-CM

## 2021-01-01 DIAGNOSIS — D64.81 ANTINEOPLASTIC CHEMOTHERAPY INDUCED ANEMIA: ICD-10-CM

## 2021-01-01 DIAGNOSIS — Z45.2 ENCOUNTER FOR CARE RELATED TO VASCULAR ACCESS PORT: Primary | ICD-10-CM

## 2021-01-01 DIAGNOSIS — T45.1X5A CHEMOTHERAPY-INDUCED FATIGUE: ICD-10-CM

## 2021-01-01 DIAGNOSIS — C54.1 ENDOMETRIAL CANCER (HCC): Chronic | ICD-10-CM

## 2021-01-01 DIAGNOSIS — E86.0 DEHYDRATION: ICD-10-CM

## 2021-01-01 DIAGNOSIS — G62.9 NEUROPATHY: ICD-10-CM

## 2021-01-01 DIAGNOSIS — E43 SEVERE MALNUTRITION (HCC): ICD-10-CM

## 2021-01-01 DIAGNOSIS — R06.02 SHORTNESS OF BREATH: Primary | ICD-10-CM

## 2021-01-01 DIAGNOSIS — Z72.0 TOBACCO ABUSE: Chronic | ICD-10-CM

## 2021-01-01 DIAGNOSIS — C78.02 MALIGNANT NEOPLASM METASTATIC TO LEFT LUNG (HCC): Primary | ICD-10-CM

## 2021-01-01 DIAGNOSIS — Z74.09 IMMOBILITY: ICD-10-CM

## 2021-01-01 DIAGNOSIS — K22.0 ACHALASIA OF ESOPHAGUS: ICD-10-CM

## 2021-01-01 DIAGNOSIS — E11.9 TYPE 2 DIABETES MELLITUS WITHOUT COMPLICATION, WITHOUT LONG-TERM CURRENT USE OF INSULIN (HCC): Primary | Chronic | ICD-10-CM

## 2021-01-01 DIAGNOSIS — B37.31 VULVOVAGINAL CANDIDIASIS: ICD-10-CM

## 2021-01-01 DIAGNOSIS — R30.0 DYSURIA: ICD-10-CM

## 2021-01-01 DIAGNOSIS — L03.313 CELLULITIS OF CHEST WALL: ICD-10-CM

## 2021-01-01 DIAGNOSIS — D64.9 ANEMIA, UNSPECIFIED TYPE: ICD-10-CM

## 2021-01-01 DIAGNOSIS — R04.0 EPISTAXIS: ICD-10-CM

## 2021-01-01 DIAGNOSIS — C54.1 ENDOMETRIAL SARCOMA (HCC): ICD-10-CM

## 2021-01-01 DIAGNOSIS — F17.219 CIGARETTE NICOTINE DEPENDENCE WITH NICOTINE-INDUCED DISORDER: Chronic | ICD-10-CM

## 2021-01-01 DIAGNOSIS — K62.5 RECTAL BLEEDING: ICD-10-CM

## 2021-01-01 DIAGNOSIS — C54.1 ENDOMETRIAL SARCOMA (HCC): Primary | ICD-10-CM

## 2021-01-01 DIAGNOSIS — R06.02 SHORTNESS OF BREATH: ICD-10-CM

## 2021-01-01 DIAGNOSIS — R13.19 OTHER DYSPHAGIA: ICD-10-CM

## 2021-01-01 DIAGNOSIS — D64.81 ANEMIA DUE TO ANTINEOPLASTIC CHEMOTHERAPY: Primary | ICD-10-CM

## 2021-01-01 LAB
ABO GROUP BLD: NORMAL
ALBUMIN SERPL-MCNC: 3.2 G/DL (ref 3.5–5.2)
ALBUMIN SERPL-MCNC: 3.3 G/DL (ref 3.5–5.2)
ALBUMIN SERPL-MCNC: 3.3 G/DL (ref 3.5–5.2)
ALBUMIN SERPL-MCNC: 3.4 G/DL (ref 3.5–5.2)
ALBUMIN SERPL-MCNC: 3.7 G/DL (ref 3.5–5.2)
ALBUMIN SERPL-MCNC: 3.8 G/DL (ref 3.5–5.2)
ALBUMIN SERPL-MCNC: 3.8 G/DL (ref 3.5–5.2)
ALBUMIN SERPL-MCNC: 3.9 G/DL (ref 3.5–5.2)
ALBUMIN SERPL-MCNC: 4 G/DL (ref 3.5–5.2)
ALBUMIN SERPL-MCNC: 4 G/DL (ref 3.5–5.2)
ALBUMIN/GLOB SERPL: 0.9 G/DL
ALBUMIN/GLOB SERPL: 1.1 G/DL
ALBUMIN/GLOB SERPL: 1.2 G/DL
ALBUMIN/GLOB SERPL: 1.3 G/DL
ALBUMIN/GLOB SERPL: 1.4 G/DL
ALBUMIN/GLOB SERPL: 1.5 G/DL
ALBUMIN/GLOB SERPL: 1.5 G/DL
ALBUMIN/GLOB SERPL: 1.6 G/DL
ALBUMIN/GLOB SERPL: 1.7 G/DL
ALBUMIN/GLOB SERPL: 1.7 G/DL
ALP SERPL-CCNC: 101 U/L (ref 39–117)
ALP SERPL-CCNC: 106 U/L (ref 39–117)
ALP SERPL-CCNC: 110 U/L (ref 39–117)
ALP SERPL-CCNC: 113 U/L (ref 39–117)
ALP SERPL-CCNC: 113 U/L (ref 39–117)
ALP SERPL-CCNC: 114 U/L (ref 39–117)
ALP SERPL-CCNC: 127 U/L (ref 39–117)
ALP SERPL-CCNC: 129 U/L (ref 39–117)
ALP SERPL-CCNC: 72 U/L (ref 39–117)
ALP SERPL-CCNC: 90 U/L (ref 39–117)
ALP SERPL-CCNC: 90 U/L (ref 39–117)
ALP SERPL-CCNC: 97 U/L (ref 39–117)
ALT SERPL W P-5'-P-CCNC: 10 U/L (ref 1–33)
ALT SERPL W P-5'-P-CCNC: 10 U/L (ref 1–33)
ALT SERPL W P-5'-P-CCNC: 11 U/L (ref 1–33)
ALT SERPL W P-5'-P-CCNC: 12 U/L (ref 1–33)
ALT SERPL W P-5'-P-CCNC: 14 U/L (ref 1–33)
ALT SERPL W P-5'-P-CCNC: 8 U/L (ref 1–33)
ALT SERPL W P-5'-P-CCNC: 9 U/L (ref 1–33)
ANION GAP SERPL CALCULATED.3IONS-SCNC: 10 MMOL/L (ref 5–15)
ANION GAP SERPL CALCULATED.3IONS-SCNC: 11 MMOL/L (ref 5–15)
ANION GAP SERPL CALCULATED.3IONS-SCNC: 11 MMOL/L (ref 5–15)
ANION GAP SERPL CALCULATED.3IONS-SCNC: 12 MMOL/L (ref 5–15)
ANION GAP SERPL CALCULATED.3IONS-SCNC: 12 MMOL/L (ref 5–15)
ANION GAP SERPL CALCULATED.3IONS-SCNC: 13 MMOL/L (ref 5–15)
ANION GAP SERPL CALCULATED.3IONS-SCNC: 14 MMOL/L (ref 5–15)
ANION GAP SERPL CALCULATED.3IONS-SCNC: 16 MMOL/L (ref 5–15)
ANION GAP SERPL CALCULATED.3IONS-SCNC: 7 MMOL/L (ref 5–15)
ANION GAP SERPL CALCULATED.3IONS-SCNC: 8 MMOL/L (ref 5–15)
ANION GAP SERPL CALCULATED.3IONS-SCNC: 9 MMOL/L (ref 5–15)
ANION GAP SERPL CALCULATED.3IONS-SCNC: 9 MMOL/L (ref 5–15)
APTT PPP: 31.1 SECONDS (ref 22–39)
AST SERPL-CCNC: 12 U/L (ref 1–32)
AST SERPL-CCNC: 14 U/L (ref 1–32)
AST SERPL-CCNC: 15 U/L (ref 1–32)
AST SERPL-CCNC: 16 U/L (ref 1–32)
AST SERPL-CCNC: 17 U/L (ref 1–32)
AST SERPL-CCNC: 18 U/L (ref 1–32)
AST SERPL-CCNC: 20 U/L (ref 1–32)
AST SERPL-CCNC: 22 U/L (ref 1–32)
BASOPHILS # BLD AUTO: 0 10*3/MM3 (ref 0–0.2)
BASOPHILS # BLD AUTO: 0.01 10*3/MM3 (ref 0–0.2)
BASOPHILS # BLD AUTO: 0.02 10*3/MM3 (ref 0–0.2)
BASOPHILS # BLD AUTO: 0.02 10*3/MM3 (ref 0–0.2)
BASOPHILS NFR BLD AUTO: 0 % (ref 0–1.5)
BASOPHILS NFR BLD AUTO: 0.3 % (ref 0–1.5)
BASOPHILS NFR BLD AUTO: 0.3 % (ref 0–1.5)
BASOPHILS NFR BLD AUTO: 0.4 % (ref 0–1.5)
BASOPHILS NFR BLD AUTO: 0.4 % (ref 0–1.5)
BASOPHILS NFR BLD AUTO: 0.7 % (ref 0–1.5)
BH BB BLOOD EXPIRATION DATE: NORMAL
BH BB BLOOD TYPE BARCODE: 600
BH BB BLOOD TYPE BARCODE: 6200
BH BB BLOOD TYPE BARCODE: 6200
BH BB BLOOD TYPE BARCODE: 8400
BH BB BLOOD TYPE BARCODE: 9500
BH BB DISPENSE STATUS: NORMAL
BH BB PRODUCT CODE: NORMAL
BH BB UNIT NUMBER: NORMAL
BILIRUB SERPL-MCNC: 0.2 MG/DL (ref 0–1.2)
BILIRUB SERPL-MCNC: 0.3 MG/DL (ref 0–1.2)
BILIRUB SERPL-MCNC: 0.6 MG/DL (ref 0–1.2)
BILIRUB SERPL-MCNC: <0.2 MG/DL (ref 0–1.2)
BILIRUB UR QL STRIP: NEGATIVE
BLD GP AB SCN SERPL QL: NEGATIVE
BUN SERPL-MCNC: 12 MG/DL (ref 8–23)
BUN SERPL-MCNC: 15 MG/DL (ref 8–23)
BUN SERPL-MCNC: 16 MG/DL (ref 8–23)
BUN SERPL-MCNC: 17 MG/DL (ref 8–23)
BUN SERPL-MCNC: 18 MG/DL (ref 8–23)
BUN SERPL-MCNC: 20 MG/DL (ref 8–23)
BUN SERPL-MCNC: 20 MG/DL (ref 8–23)
BUN SERPL-MCNC: 21 MG/DL (ref 8–23)
BUN SERPL-MCNC: 22 MG/DL (ref 8–23)
BUN SERPL-MCNC: 25 MG/DL (ref 8–23)
BUN SERPL-MCNC: 28 MG/DL (ref 8–23)
BUN SERPL-MCNC: 28 MG/DL (ref 8–23)
BUN SERPL-MCNC: 35 MG/DL (ref 8–23)
BUN SERPL-MCNC: 37 MG/DL (ref 8–23)
BUN SERPL-MCNC: 42 MG/DL (ref 8–23)
BUN/CREAT SERPL: 20 (ref 7–25)
BUN/CREAT SERPL: 22.7 (ref 7–25)
BUN/CREAT SERPL: 23.5 (ref 7–25)
BUN/CREAT SERPL: 24.6 (ref 7–25)
BUN/CREAT SERPL: 26.7 (ref 7–25)
BUN/CREAT SERPL: 27.2 (ref 7–25)
BUN/CREAT SERPL: 28.3 (ref 7–25)
BUN/CREAT SERPL: 31.3 (ref 7–25)
BUN/CREAT SERPL: 31.3 (ref 7–25)
BUN/CREAT SERPL: 33.3 (ref 7–25)
BUN/CREAT SERPL: 35.7 (ref 7–25)
BUN/CREAT SERPL: 36.7 (ref 7–25)
BUN/CREAT SERPL: 39.3 (ref 7–25)
BUN/CREAT SERPL: 40 (ref 7–25)
BUN/CREAT SERPL: 41.2 (ref 7–25)
BUN/CREAT SERPL: 55.3 (ref 7–25)
BUN/CREAT SERPL: 56.9 (ref 7–25)
CALCIUM SPEC-SCNC: 8.1 MG/DL (ref 8.6–10.5)
CALCIUM SPEC-SCNC: 8.8 MG/DL (ref 8.6–10.5)
CALCIUM SPEC-SCNC: 9.1 MG/DL (ref 8.6–10.5)
CALCIUM SPEC-SCNC: 9.2 MG/DL (ref 8.6–10.5)
CALCIUM SPEC-SCNC: 9.3 MG/DL (ref 8.6–10.5)
CALCIUM SPEC-SCNC: 9.3 MG/DL (ref 8.6–10.5)
CALCIUM SPEC-SCNC: 9.4 MG/DL (ref 8.6–10.5)
CALCIUM SPEC-SCNC: 9.5 MG/DL (ref 8.6–10.5)
CALCIUM SPEC-SCNC: 9.5 MG/DL (ref 8.6–10.5)
CALCIUM SPEC-SCNC: 9.6 MG/DL (ref 8.6–10.5)
CALCIUM SPEC-SCNC: 9.7 MG/DL (ref 8.6–10.5)
CALCIUM SPEC-SCNC: 9.9 MG/DL (ref 8.6–10.5)
CALCIUM SPEC-SCNC: 9.9 MG/DL (ref 8.6–10.5)
CHLORIDE SERPL-SCNC: 100 MMOL/L (ref 98–107)
CHLORIDE SERPL-SCNC: 101 MMOL/L (ref 98–107)
CHLORIDE SERPL-SCNC: 102 MMOL/L (ref 98–107)
CHLORIDE SERPL-SCNC: 102 MMOL/L (ref 98–107)
CHLORIDE SERPL-SCNC: 103 MMOL/L (ref 98–107)
CHLORIDE SERPL-SCNC: 104 MMOL/L (ref 98–107)
CHLORIDE SERPL-SCNC: 105 MMOL/L (ref 98–107)
CHLORIDE SERPL-SCNC: 107 MMOL/L (ref 98–107)
CHLORIDE SERPL-SCNC: 98 MMOL/L (ref 98–107)
CLARITY UR: CLEAR
CO2 SERPL-SCNC: 22 MMOL/L (ref 22–29)
CO2 SERPL-SCNC: 23 MMOL/L (ref 22–29)
CO2 SERPL-SCNC: 23 MMOL/L (ref 22–29)
CO2 SERPL-SCNC: 24 MMOL/L (ref 22–29)
CO2 SERPL-SCNC: 24 MMOL/L (ref 22–29)
CO2 SERPL-SCNC: 26 MMOL/L (ref 22–29)
CO2 SERPL-SCNC: 27 MMOL/L (ref 22–29)
CO2 SERPL-SCNC: 28 MMOL/L (ref 22–29)
CO2 SERPL-SCNC: 29 MMOL/L (ref 22–29)
CO2 SERPL-SCNC: 31 MMOL/L (ref 22–29)
COLOR UR: YELLOW
CREAT BLDA-MCNC: 0.5 MG/DL
CREAT BLDA-MCNC: 0.5 MG/DL (ref 0.6–1.3)
CREAT BLDA-MCNC: 0.7 MG/DL (ref 0.6–1.3)
CREAT BLDA-MCNC: 0.7 MG/DL (ref 0.6–1.3)
CREAT BLDA-MCNC: 0.8 MG/DL
CREAT BLDA-MCNC: 0.8 MG/DL (ref 0.6–1.3)
CREAT BLDA-MCNC: 0.9 MG/DL (ref 0.6–1.3)
CREAT SERPL-MCNC: 0.4 MG/DL (ref 0.57–1)
CREAT SERPL-MCNC: 0.49 MG/DL (ref 0.57–1)
CREAT SERPL-MCNC: 0.51 MG/DL (ref 0.57–1)
CREAT SERPL-MCNC: 0.56 MG/DL (ref 0.57–1)
CREAT SERPL-MCNC: 0.6 MG/DL (ref 0.57–1)
CREAT SERPL-MCNC: 0.63 MG/DL (ref 0.57–1)
CREAT SERPL-MCNC: 0.64 MG/DL (ref 0.57–1)
CREAT SERPL-MCNC: 0.65 MG/DL (ref 0.57–1)
CREAT SERPL-MCNC: 0.65 MG/DL (ref 0.57–1)
CREAT SERPL-MCNC: 0.66 MG/DL (ref 0.57–1)
CREAT SERPL-MCNC: 0.68 MG/DL (ref 0.57–1)
CREAT SERPL-MCNC: 0.76 MG/DL (ref 0.57–1)
CREAT SERPL-MCNC: 0.8 MG/DL (ref 0.57–1)
CREAT SERPL-MCNC: 0.8 MG/DL (ref 0.57–1)
CREAT SERPL-MCNC: 0.81 MG/DL (ref 0.57–1)
CREAT SERPL-MCNC: 0.89 MG/DL (ref 0.57–1)
CREAT SERPL-MCNC: 1.05 MG/DL (ref 0.57–1)
CROSSMATCH INTERPRETATION: NORMAL
CYTO UR: NORMAL
D-LACTATE SERPL-SCNC: 1.9 MMOL/L (ref 0.5–2)
DEPRECATED RDW RBC AUTO: 49.5 FL (ref 37–54)
DEPRECATED RDW RBC AUTO: 49.9 FL (ref 37–54)
DEPRECATED RDW RBC AUTO: 50.5 FL (ref 37–54)
DEPRECATED RDW RBC AUTO: 51.3 FL (ref 37–54)
DEPRECATED RDW RBC AUTO: 51.6 FL (ref 37–54)
DEPRECATED RDW RBC AUTO: 51.8 FL (ref 37–54)
DEPRECATED RDW RBC AUTO: 54.3 FL (ref 37–54)
DEPRECATED RDW RBC AUTO: 54.4 FL (ref 37–54)
DEPRECATED RDW RBC AUTO: 55 FL (ref 37–54)
DEPRECATED RDW RBC AUTO: 57.2 FL (ref 37–54)
DEPRECATED RDW RBC AUTO: 57.7 FL (ref 37–54)
DEPRECATED RDW RBC AUTO: 57.9 FL (ref 37–54)
EOSINOPHIL # BLD AUTO: 0 10*3/MM3 (ref 0–0.4)
EOSINOPHIL # BLD AUTO: 0.01 10*3/MM3 (ref 0–0.4)
EOSINOPHIL # BLD AUTO: 0.02 10*3/MM3 (ref 0–0.4)
EOSINOPHIL # BLD AUTO: 0.03 10*3/MM3 (ref 0–0.4)
EOSINOPHIL NFR BLD AUTO: 0 % (ref 0.3–6.2)
EOSINOPHIL NFR BLD AUTO: 0.3 % (ref 0.3–6.2)
EOSINOPHIL NFR BLD AUTO: 0.4 % (ref 0.3–6.2)
EOSINOPHIL NFR BLD AUTO: 0.6 % (ref 0.3–6.2)
EOSINOPHIL NFR BLD AUTO: 0.7 % (ref 0.3–6.2)
ERYTHROCYTE [DISTWIDTH] IN BLOOD BY AUTOMATED COUNT: 16 % (ref 12.3–15.4)
ERYTHROCYTE [DISTWIDTH] IN BLOOD BY AUTOMATED COUNT: 16.1 % (ref 12.3–15.4)
ERYTHROCYTE [DISTWIDTH] IN BLOOD BY AUTOMATED COUNT: 16.2 % (ref 12.3–15.4)
ERYTHROCYTE [DISTWIDTH] IN BLOOD BY AUTOMATED COUNT: 16.3 % (ref 12.3–15.4)
ERYTHROCYTE [DISTWIDTH] IN BLOOD BY AUTOMATED COUNT: 16.5 % (ref 12.3–15.4)
ERYTHROCYTE [DISTWIDTH] IN BLOOD BY AUTOMATED COUNT: 16.7 % (ref 12.3–15.4)
ERYTHROCYTE [DISTWIDTH] IN BLOOD BY AUTOMATED COUNT: 17.2 % (ref 12.3–15.4)
ERYTHROCYTE [DISTWIDTH] IN BLOOD BY AUTOMATED COUNT: 17.2 % (ref 12.3–15.4)
ERYTHROCYTE [DISTWIDTH] IN BLOOD BY AUTOMATED COUNT: 17.3 % (ref 12.3–15.4)
ERYTHROCYTE [DISTWIDTH] IN BLOOD BY AUTOMATED COUNT: 17.5 % (ref 12.3–15.4)
ERYTHROCYTE [DISTWIDTH] IN BLOOD BY AUTOMATED COUNT: 17.6 % (ref 12.3–15.4)
ERYTHROCYTE [DISTWIDTH] IN BLOOD BY AUTOMATED COUNT: 17.9 % (ref 12.3–15.4)
ERYTHROCYTE [DISTWIDTH] IN BLOOD BY AUTOMATED COUNT: 19 % (ref 12.3–15.4)
ERYTHROCYTE [DISTWIDTH] IN BLOOD BY AUTOMATED COUNT: 20.5 % (ref 12.3–15.4)
ERYTHROCYTE [DISTWIDTH] IN BLOOD BY AUTOMATED COUNT: 21.1 % (ref 12.3–15.4)
ERYTHROCYTE [DISTWIDTH] IN BLOOD BY AUTOMATED COUNT: 21.7 % (ref 12.3–15.4)
ERYTHROCYTE [DISTWIDTH] IN BLOOD BY AUTOMATED COUNT: 23.3 % (ref 12.3–15.4)
ERYTHROCYTE [DISTWIDTH] IN BLOOD BY AUTOMATED COUNT: 23.7 % (ref 12.3–15.4)
ERYTHROCYTE [DISTWIDTH] IN BLOOD BY AUTOMATED COUNT: 24.4 % (ref 12.3–15.4)
FERRITIN SERPL-MCNC: 499.8 NG/ML (ref 13–150)
GFR SERPL CREATININE-BSD FRML MDRD: 107 ML/MIN/1.73
GFR SERPL CREATININE-BSD FRML MDRD: 119 ML/MIN/1.73
GFR SERPL CREATININE-BSD FRML MDRD: 125 ML/MIN/1.73
GFR SERPL CREATININE-BSD FRML MDRD: 52 ML/MIN/1.73
GFR SERPL CREATININE-BSD FRML MDRD: 63 ML/MIN/1.73
GFR SERPL CREATININE-BSD FRML MDRD: 70 ML/MIN/1.73
GFR SERPL CREATININE-BSD FRML MDRD: 71 ML/MIN/1.73
GFR SERPL CREATININE-BSD FRML MDRD: 71 ML/MIN/1.73
GFR SERPL CREATININE-BSD FRML MDRD: 75 ML/MIN/1.73
GFR SERPL CREATININE-BSD FRML MDRD: 86 ML/MIN/1.73
GFR SERPL CREATININE-BSD FRML MDRD: 88 ML/MIN/1.73
GFR SERPL CREATININE-BSD FRML MDRD: 90 ML/MIN/1.73
GFR SERPL CREATININE-BSD FRML MDRD: 90 ML/MIN/1.73
GFR SERPL CREATININE-BSD FRML MDRD: 91 ML/MIN/1.73
GFR SERPL CREATININE-BSD FRML MDRD: 93 ML/MIN/1.73
GFR SERPL CREATININE-BSD FRML MDRD: 99 ML/MIN/1.73
GFR SERPL CREATININE-BSD FRML MDRD: >150 ML/MIN/1.73
GLOBULIN UR ELPH-MCNC: 2.2 GM/DL
GLOBULIN UR ELPH-MCNC: 2.3 GM/DL
GLOBULIN UR ELPH-MCNC: 2.4 GM/DL
GLOBULIN UR ELPH-MCNC: 2.5 GM/DL
GLOBULIN UR ELPH-MCNC: 2.6 GM/DL
GLOBULIN UR ELPH-MCNC: 2.6 GM/DL
GLOBULIN UR ELPH-MCNC: 2.8 GM/DL
GLOBULIN UR ELPH-MCNC: 3.1 GM/DL
GLOBULIN UR ELPH-MCNC: 3.2 GM/DL
GLOBULIN UR ELPH-MCNC: 3.2 GM/DL
GLOBULIN UR ELPH-MCNC: 3.4 GM/DL
GLOBULIN UR ELPH-MCNC: 3.8 GM/DL
GLUCOSE BLDC GLUCOMTR-MCNC: 109 MG/DL (ref 70–130)
GLUCOSE BLDC GLUCOMTR-MCNC: 112 MG/DL (ref 70–130)
GLUCOSE BLDC GLUCOMTR-MCNC: 117 MG/DL (ref 70–130)
GLUCOSE BLDC GLUCOMTR-MCNC: 117 MG/DL (ref 70–130)
GLUCOSE BLDC GLUCOMTR-MCNC: 118 MG/DL (ref 70–130)
GLUCOSE BLDC GLUCOMTR-MCNC: 130 MG/DL (ref 70–130)
GLUCOSE BLDC GLUCOMTR-MCNC: 132 MG/DL (ref 70–130)
GLUCOSE BLDC GLUCOMTR-MCNC: 133 MG/DL (ref 70–130)
GLUCOSE BLDC GLUCOMTR-MCNC: 136 MG/DL (ref 70–130)
GLUCOSE BLDC GLUCOMTR-MCNC: 137 MG/DL (ref 70–130)
GLUCOSE BLDC GLUCOMTR-MCNC: 137 MG/DL (ref 70–130)
GLUCOSE BLDC GLUCOMTR-MCNC: 139 MG/DL (ref 70–130)
GLUCOSE BLDC GLUCOMTR-MCNC: 144 MG/DL (ref 70–130)
GLUCOSE BLDC GLUCOMTR-MCNC: 145 MG/DL (ref 70–130)
GLUCOSE BLDC GLUCOMTR-MCNC: 146 MG/DL (ref 70–130)
GLUCOSE BLDC GLUCOMTR-MCNC: 147 MG/DL (ref 70–130)
GLUCOSE BLDC GLUCOMTR-MCNC: 160 MG/DL (ref 70–130)
GLUCOSE BLDC GLUCOMTR-MCNC: 171 MG/DL (ref 70–130)
GLUCOSE BLDC GLUCOMTR-MCNC: 172 MG/DL (ref 70–130)
GLUCOSE BLDC GLUCOMTR-MCNC: 172 MG/DL (ref 70–130)
GLUCOSE BLDC GLUCOMTR-MCNC: 180 MG/DL (ref 70–130)
GLUCOSE BLDC GLUCOMTR-MCNC: 181 MG/DL (ref 70–130)
GLUCOSE BLDC GLUCOMTR-MCNC: 188 MG/DL (ref 70–130)
GLUCOSE BLDC GLUCOMTR-MCNC: 199 MG/DL (ref 70–130)
GLUCOSE BLDC GLUCOMTR-MCNC: 203 MG/DL (ref 70–130)
GLUCOSE BLDC GLUCOMTR-MCNC: 209 MG/DL (ref 70–130)
GLUCOSE BLDC GLUCOMTR-MCNC: 234 MG/DL (ref 70–130)
GLUCOSE BLDC GLUCOMTR-MCNC: 234 MG/DL (ref 70–130)
GLUCOSE BLDC GLUCOMTR-MCNC: 378 MG/DL (ref 70–130)
GLUCOSE BLDC GLUCOMTR-MCNC: 407 MG/DL (ref 70–130)
GLUCOSE BLDC GLUCOMTR-MCNC: 92 MG/DL (ref 70–130)
GLUCOSE SERPL-MCNC: 107 MG/DL (ref 65–99)
GLUCOSE SERPL-MCNC: 112 MG/DL (ref 65–99)
GLUCOSE SERPL-MCNC: 112 MG/DL (ref 65–99)
GLUCOSE SERPL-MCNC: 117 MG/DL (ref 65–99)
GLUCOSE SERPL-MCNC: 119 MG/DL (ref 65–99)
GLUCOSE SERPL-MCNC: 125 MG/DL (ref 65–99)
GLUCOSE SERPL-MCNC: 128 MG/DL (ref 65–99)
GLUCOSE SERPL-MCNC: 130 MG/DL (ref 65–99)
GLUCOSE SERPL-MCNC: 133 MG/DL (ref 65–99)
GLUCOSE SERPL-MCNC: 133 MG/DL (ref 65–99)
GLUCOSE SERPL-MCNC: 143 MG/DL (ref 65–99)
GLUCOSE SERPL-MCNC: 148 MG/DL (ref 65–99)
GLUCOSE SERPL-MCNC: 168 MG/DL (ref 65–99)
GLUCOSE SERPL-MCNC: 207 MG/DL (ref 65–99)
GLUCOSE SERPL-MCNC: 218 MG/DL (ref 65–99)
GLUCOSE SERPL-MCNC: 77 MG/DL (ref 65–99)
GLUCOSE SERPL-MCNC: 87 MG/DL (ref 65–99)
GLUCOSE UR STRIP-MCNC: NEGATIVE MG/DL
HBA1C MFR BLD: 5.4 % (ref 4.8–5.6)
HCT VFR BLD AUTO: 18.2 % (ref 34–46.6)
HCT VFR BLD AUTO: 19.3 % (ref 34–46.6)
HCT VFR BLD AUTO: 20.6 % (ref 34–46.6)
HCT VFR BLD AUTO: 24.1 % (ref 34–46.6)
HCT VFR BLD AUTO: 25.6 % (ref 34–46.6)
HCT VFR BLD AUTO: 26 % (ref 34–46.6)
HCT VFR BLD AUTO: 26.6 % (ref 34–46.6)
HCT VFR BLD AUTO: 26.8 % (ref 34–46.6)
HCT VFR BLD AUTO: 26.9 % (ref 34–46.6)
HCT VFR BLD AUTO: 26.9 % (ref 34–46.6)
HCT VFR BLD AUTO: 27 % (ref 34–46.6)
HCT VFR BLD AUTO: 28.6 % (ref 34–46.6)
HCT VFR BLD AUTO: 29.1 % (ref 34–46.6)
HCT VFR BLD AUTO: 30.1 % (ref 34–46.6)
HCT VFR BLD AUTO: 31.4 % (ref 34–46.6)
HCT VFR BLD AUTO: 31.6 % (ref 34–46.6)
HCT VFR BLD AUTO: 34.2 % (ref 34–46.6)
HGB BLD-MCNC: 10.6 G/DL (ref 12–15.9)
HGB BLD-MCNC: 6 G/DL (ref 12–15.9)
HGB BLD-MCNC: 6.1 G/DL (ref 12–15.9)
HGB BLD-MCNC: 7 G/DL (ref 12–15.9)
HGB BLD-MCNC: 8.1 G/DL (ref 12–15.9)
HGB BLD-MCNC: 8.3 G/DL (ref 12–15.9)
HGB BLD-MCNC: 8.4 G/DL (ref 12–15.9)
HGB BLD-MCNC: 8.5 G/DL (ref 12–15.9)
HGB BLD-MCNC: 8.5 G/DL (ref 12–15.9)
HGB BLD-MCNC: 8.7 G/DL (ref 12–15.9)
HGB BLD-MCNC: 8.9 G/DL (ref 12–15.9)
HGB BLD-MCNC: 8.9 G/DL (ref 12–15.9)
HGB BLD-MCNC: 9.1 G/DL (ref 12–15.9)
HGB BLD-MCNC: 9.4 G/DL (ref 12–15.9)
HGB BLD-MCNC: 9.4 G/DL (ref 12–15.9)
HGB BLD-MCNC: 9.5 G/DL (ref 12–15.9)
HGB BLD-MCNC: 9.8 G/DL (ref 12–15.9)
HGB UR QL STRIP.AUTO: NEGATIVE
HOLD SPECIMEN: NORMAL
HYPOCHROMIA BLD QL: NORMAL
IMM GRANULOCYTES # BLD AUTO: 0.01 10*3/MM3 (ref 0–0.05)
IMM GRANULOCYTES # BLD AUTO: 0.02 10*3/MM3 (ref 0–0.05)
IMM GRANULOCYTES # BLD AUTO: 0.02 10*3/MM3 (ref 0–0.05)
IMM GRANULOCYTES # BLD AUTO: 0.03 10*3/MM3 (ref 0–0.05)
IMM GRANULOCYTES # BLD AUTO: 0.03 10*3/MM3 (ref 0–0.05)
IMM GRANULOCYTES NFR BLD AUTO: 0.3 % (ref 0–0.5)
IMM GRANULOCYTES NFR BLD AUTO: 0.4 % (ref 0–0.5)
IMM GRANULOCYTES NFR BLD AUTO: 0.4 % (ref 0–0.5)
IMM GRANULOCYTES NFR BLD AUTO: 0.5 % (ref 0–0.5)
IMM GRANULOCYTES NFR BLD AUTO: 0.6 % (ref 0–0.5)
IMM GRANULOCYTES NFR BLD AUTO: 0.6 % (ref 0–0.5)
IMM GRANULOCYTES NFR BLD AUTO: 2 % (ref 0–0.5)
INR PPP: 0.98 (ref 0.85–1.16)
IRON 24H UR-MRATE: 94 MCG/DL (ref 37–145)
IRON SATN MFR SERPL: 44 % (ref 20–50)
KETONES UR QL STRIP: NEGATIVE
LAB AP CASE REPORT: NORMAL
LAB AP CLINICAL INFORMATION: NORMAL
LEUKOCYTE ESTERASE UR QL STRIP.AUTO: NEGATIVE
LIPASE SERPL-CCNC: 26 U/L (ref 13–60)
LIPASE SERPL-CCNC: 30 U/L (ref 13–60)
LYMPHOCYTES # BLD AUTO: 0.3 10*3/MM3 (ref 0.7–3.1)
LYMPHOCYTES # BLD AUTO: 0.33 10*3/MM3 (ref 0.7–3.1)
LYMPHOCYTES # BLD AUTO: 0.36 10*3/MM3 (ref 0.7–3.1)
LYMPHOCYTES # BLD AUTO: 0.39 10*3/MM3 (ref 0.7–3.1)
LYMPHOCYTES # BLD AUTO: 0.4 10*3/MM3 (ref 0.7–3.1)
LYMPHOCYTES # BLD AUTO: 0.4 10*3/MM3 (ref 0.7–3.1)
LYMPHOCYTES # BLD AUTO: 0.44 10*3/MM3 (ref 0.7–3.1)
LYMPHOCYTES # BLD AUTO: 0.47 10*3/MM3 (ref 0.7–3.1)
LYMPHOCYTES # BLD AUTO: 0.5 10*3/MM3 (ref 0.7–3.1)
LYMPHOCYTES # BLD AUTO: 0.53 10*3/MM3 (ref 0.7–3.1)
LYMPHOCYTES # BLD AUTO: 0.7 10*3/MM3 (ref 0.7–3.1)
LYMPHOCYTES # BLD AUTO: 0.7 10*3/MM3 (ref 0.7–3.1)
LYMPHOCYTES # BLD AUTO: 0.73 10*3/MM3 (ref 0.7–3.1)
LYMPHOCYTES # BLD AUTO: 0.73 10*3/MM3 (ref 0.7–3.1)
LYMPHOCYTES # BLD AUTO: 0.83 10*3/MM3 (ref 0.7–3.1)
LYMPHOCYTES NFR BLD AUTO: 12.8 % (ref 19.6–45.3)
LYMPHOCYTES NFR BLD AUTO: 13.2 % (ref 19.6–45.3)
LYMPHOCYTES NFR BLD AUTO: 13.6 % (ref 19.6–45.3)
LYMPHOCYTES NFR BLD AUTO: 13.9 % (ref 19.6–45.3)
LYMPHOCYTES NFR BLD AUTO: 14.9 % (ref 19.6–45.3)
LYMPHOCYTES NFR BLD AUTO: 15.4 % (ref 19.6–45.3)
LYMPHOCYTES NFR BLD AUTO: 15.5 % (ref 19.6–45.3)
LYMPHOCYTES NFR BLD AUTO: 16 % (ref 19.6–45.3)
LYMPHOCYTES NFR BLD AUTO: 16.4 % (ref 19.6–45.3)
LYMPHOCYTES NFR BLD AUTO: 16.8 % (ref 19.6–45.3)
LYMPHOCYTES NFR BLD AUTO: 17.1 % (ref 19.6–45.3)
LYMPHOCYTES NFR BLD AUTO: 19.2 % (ref 19.6–45.3)
LYMPHOCYTES NFR BLD AUTO: 21.6 % (ref 19.6–45.3)
LYMPHOCYTES NFR BLD AUTO: 27 % (ref 19.6–45.3)
LYMPHOCYTES NFR BLD AUTO: 29.9 % (ref 19.6–45.3)
LYMPHOCYTES NFR BLD AUTO: 7.7 % (ref 19.6–45.3)
LYMPHOCYTES NFR BLD AUTO: 9.7 % (ref 19.6–45.3)
MAGNESIUM SERPL-MCNC: 1.5 MG/DL (ref 1.6–2.4)
MAGNESIUM SERPL-MCNC: 2 MG/DL (ref 1.6–2.4)
MCH RBC QN AUTO: 24.7 PG (ref 26.6–33)
MCH RBC QN AUTO: 25.8 PG (ref 26.6–33)
MCH RBC QN AUTO: 26.3 PG (ref 26.6–33)
MCH RBC QN AUTO: 26.6 PG (ref 26.6–33)
MCH RBC QN AUTO: 27.2 PG (ref 26.6–33)
MCH RBC QN AUTO: 29.2 PG (ref 26.6–33)
MCH RBC QN AUTO: 29.2 PG (ref 26.6–33)
MCH RBC QN AUTO: 29.5 PG (ref 26.6–33)
MCH RBC QN AUTO: 29.6 PG (ref 26.6–33)
MCH RBC QN AUTO: 29.6 PG (ref 26.6–33)
MCH RBC QN AUTO: 29.9 PG (ref 26.6–33)
MCH RBC QN AUTO: 29.9 PG (ref 26.6–33)
MCH RBC QN AUTO: 30 PG (ref 26.6–33)
MCH RBC QN AUTO: 30.2 PG (ref 26.6–33)
MCHC RBC AUTO-ENTMCNC: 29 G/DL (ref 31.5–35.7)
MCHC RBC AUTO-ENTMCNC: 29.7 G/DL (ref 31.5–35.7)
MCHC RBC AUTO-ENTMCNC: 30.9 G/DL (ref 31.5–35.7)
MCHC RBC AUTO-ENTMCNC: 31.6 G/DL (ref 31.5–35.7)
MCHC RBC AUTO-ENTMCNC: 32.1 G/DL (ref 31.5–35.7)
MCHC RBC AUTO-ENTMCNC: 32.2 G/DL (ref 31.5–35.7)
MCHC RBC AUTO-ENTMCNC: 32.4 G/DL (ref 31.5–35.7)
MCHC RBC AUTO-ENTMCNC: 32.5 G/DL (ref 31.5–35.7)
MCHC RBC AUTO-ENTMCNC: 32.7 G/DL (ref 31.5–35.7)
MCHC RBC AUTO-ENTMCNC: 32.7 G/DL (ref 31.5–35.7)
MCHC RBC AUTO-ENTMCNC: 32.8 G/DL (ref 31.5–35.7)
MCHC RBC AUTO-ENTMCNC: 32.9 G/DL (ref 31.5–35.7)
MCHC RBC AUTO-ENTMCNC: 33 G/DL (ref 31.5–35.7)
MCHC RBC AUTO-ENTMCNC: 33 G/DL (ref 31.5–35.7)
MCHC RBC AUTO-ENTMCNC: 33.1 G/DL (ref 31.5–35.7)
MCHC RBC AUTO-ENTMCNC: 33.5 G/DL (ref 31.5–35.7)
MCHC RBC AUTO-ENTMCNC: 34.2 G/DL (ref 31.5–35.7)
MCV RBC AUTO: 84.4 FL (ref 79–97)
MCV RBC AUTO: 84.8 FL (ref 79–97)
MCV RBC AUTO: 85.1 FL (ref 79–97)
MCV RBC AUTO: 85.3 FL (ref 79–97)
MCV RBC AUTO: 86.8 FL (ref 79–97)
MCV RBC AUTO: 88.3 FL (ref 79–97)
MCV RBC AUTO: 88.5 FL (ref 79–97)
MCV RBC AUTO: 88.8 FL (ref 79–97)
MCV RBC AUTO: 89.4 FL (ref 79–97)
MCV RBC AUTO: 90.3 FL (ref 79–97)
MCV RBC AUTO: 90.5 FL (ref 79–97)
MCV RBC AUTO: 90.8 FL (ref 79–97)
MCV RBC AUTO: 91 FL (ref 79–97)
MCV RBC AUTO: 91 FL (ref 79–97)
MCV RBC AUTO: 91.4 FL (ref 79–97)
MCV RBC AUTO: 91.7 FL (ref 79–97)
MCV RBC AUTO: 92.4 FL (ref 79–97)
MCV RBC AUTO: 92.8 FL (ref 79–97)
MCV RBC AUTO: 93.2 FL (ref 79–97)
MONOCYTES # BLD AUTO: 0.1 10*3/MM3 (ref 0.1–0.9)
MONOCYTES # BLD AUTO: 0.13 10*3/MM3 (ref 0.1–0.9)
MONOCYTES # BLD AUTO: 0.16 10*3/MM3 (ref 0.1–0.9)
MONOCYTES # BLD AUTO: 0.17 10*3/MM3 (ref 0.1–0.9)
MONOCYTES # BLD AUTO: 0.2 10*3/MM3 (ref 0.1–0.9)
MONOCYTES # BLD AUTO: 0.2 10*3/MM3 (ref 0.1–0.9)
MONOCYTES # BLD AUTO: 0.25 10*3/MM3 (ref 0.1–0.9)
MONOCYTES # BLD AUTO: 0.25 10*3/MM3 (ref 0.1–0.9)
MONOCYTES # BLD AUTO: 0.26 10*3/MM3 (ref 0.1–0.9)
MONOCYTES # BLD AUTO: 0.29 10*3/MM3 (ref 0.1–0.9)
MONOCYTES # BLD AUTO: 0.3 10*3/MM3 (ref 0.1–0.9)
MONOCYTES # BLD AUTO: 0.31 10*3/MM3 (ref 0.1–0.9)
MONOCYTES # BLD AUTO: 0.37 10*3/MM3 (ref 0.1–0.9)
MONOCYTES # BLD AUTO: 0.38 10*3/MM3 (ref 0.1–0.9)
MONOCYTES NFR BLD AUTO: 10.3 % (ref 5–12)
MONOCYTES NFR BLD AUTO: 2.4 % (ref 5–12)
MONOCYTES NFR BLD AUTO: 2.8 % (ref 5–12)
MONOCYTES NFR BLD AUTO: 2.9 % (ref 5–12)
MONOCYTES NFR BLD AUTO: 3.7 % (ref 5–12)
MONOCYTES NFR BLD AUTO: 3.8 % (ref 5–12)
MONOCYTES NFR BLD AUTO: 4.5 % (ref 5–12)
MONOCYTES NFR BLD AUTO: 5.9 % (ref 5–12)
MONOCYTES NFR BLD AUTO: 6.6 % (ref 5–12)
MONOCYTES NFR BLD AUTO: 6.6 % (ref 5–12)
MONOCYTES NFR BLD AUTO: 7.7 % (ref 5–12)
MONOCYTES NFR BLD AUTO: 8.1 % (ref 5–12)
MONOCYTES NFR BLD AUTO: 8.5 % (ref 5–12)
MONOCYTES NFR BLD AUTO: 8.5 % (ref 5–12)
MONOCYTES NFR BLD AUTO: 8.6 % (ref 5–12)
MONOCYTES NFR BLD AUTO: 8.7 % (ref 5–12)
MONOCYTES NFR BLD AUTO: 9.6 % (ref 5–12)
NEUTROPHILS NFR BLD AUTO: 1.02 10*3/MM3 (ref 1.7–7)
NEUTROPHILS NFR BLD AUTO: 1.06 10*3/MM3 (ref 1.7–7)
NEUTROPHILS NFR BLD AUTO: 1.4 10*3/MM3 (ref 1.7–7)
NEUTROPHILS NFR BLD AUTO: 1.8 10*3/MM3 (ref 1.7–7)
NEUTROPHILS NFR BLD AUTO: 1.88 10*3/MM3 (ref 1.7–7)
NEUTROPHILS NFR BLD AUTO: 2.2 10*3/MM3 (ref 1.7–7)
NEUTROPHILS NFR BLD AUTO: 2.21 10*3/MM3 (ref 1.7–7)
NEUTROPHILS NFR BLD AUTO: 2.29 10*3/MM3 (ref 1.7–7)
NEUTROPHILS NFR BLD AUTO: 2.3 10*3/MM3 (ref 1.7–7)
NEUTROPHILS NFR BLD AUTO: 2.68 10*3/MM3 (ref 1.7–7)
NEUTROPHILS NFR BLD AUTO: 2.79 10*3/MM3 (ref 1.7–7)
NEUTROPHILS NFR BLD AUTO: 3.2 10*3/MM3 (ref 1.7–7)
NEUTROPHILS NFR BLD AUTO: 3.4 10*3/MM3 (ref 1.7–7)
NEUTROPHILS NFR BLD AUTO: 3.41 10*3/MM3 (ref 1.7–7)
NEUTROPHILS NFR BLD AUTO: 3.65 10*3/MM3 (ref 1.7–7)
NEUTROPHILS NFR BLD AUTO: 4.2 10*3/MM3 (ref 1.7–7)
NEUTROPHILS NFR BLD AUTO: 5.6 10*3/MM3 (ref 1.7–7)
NEUTROPHILS NFR BLD AUTO: 59.9 % (ref 42.7–76)
NEUTROPHILS NFR BLD AUTO: 66.5 % (ref 42.7–76)
NEUTROPHILS NFR BLD AUTO: 68.5 % (ref 42.7–76)
NEUTROPHILS NFR BLD AUTO: 73.1 % (ref 42.7–76)
NEUTROPHILS NFR BLD AUTO: 73.9 % (ref 42.7–76)
NEUTROPHILS NFR BLD AUTO: 74.7 % (ref 42.7–76)
NEUTROPHILS NFR BLD AUTO: 75.6 % (ref 42.7–76)
NEUTROPHILS NFR BLD AUTO: 75.9 % (ref 42.7–76)
NEUTROPHILS NFR BLD AUTO: 77.1 % (ref 42.7–76)
NEUTROPHILS NFR BLD AUTO: 77.7 % (ref 42.7–76)
NEUTROPHILS NFR BLD AUTO: 78.1 % (ref 42.7–76)
NEUTROPHILS NFR BLD AUTO: 78.1 % (ref 42.7–76)
NEUTROPHILS NFR BLD AUTO: 80.5 % (ref 42.7–76)
NEUTROPHILS NFR BLD AUTO: 81.7 % (ref 42.7–76)
NEUTROPHILS NFR BLD AUTO: 82.3 % (ref 42.7–76)
NEUTROPHILS NFR BLD AUTO: 86.6 % (ref 42.7–76)
NEUTROPHILS NFR BLD AUTO: 89.5 % (ref 42.7–76)
NITRITE UR QL STRIP: NEGATIVE
NRBC BLD AUTO-RTO: 0 /100 WBC (ref 0–0.2)
NT-PROBNP SERPL-MCNC: 1095 PG/ML (ref 0–900)
NT-PROBNP SERPL-MCNC: 609.3 PG/ML (ref 0–900)
PATH REPORT.FINAL DX SPEC: NORMAL
PATH REPORT.GROSS SPEC: NORMAL
PH UR STRIP.AUTO: 7 [PH] (ref 5–8)
PHOSPHATE SERPL-MCNC: 3 MG/DL (ref 2.5–4.5)
PLAT MORPH BLD: NORMAL
PLATELET # BLD AUTO: 101 10*3/MM3 (ref 140–450)
PLATELET # BLD AUTO: 11 10*3/MM3 (ref 140–450)
PLATELET # BLD AUTO: 113 10*3/MM3 (ref 140–450)
PLATELET # BLD AUTO: 122 10*3/MM3 (ref 140–450)
PLATELET # BLD AUTO: 138 10*3/MM3 (ref 140–450)
PLATELET # BLD AUTO: 146 10*3/MM3 (ref 140–450)
PLATELET # BLD AUTO: 149 10*3/MM3 (ref 140–450)
PLATELET # BLD AUTO: 179 10*3/MM3 (ref 140–450)
PLATELET # BLD AUTO: 19 10*3/MM3 (ref 140–450)
PLATELET # BLD AUTO: 261 10*3/MM3 (ref 140–450)
PLATELET # BLD AUTO: 44 10*3/MM3 (ref 140–450)
PLATELET # BLD AUTO: 47 10*3/MM3 (ref 140–450)
PLATELET # BLD AUTO: 48 10*3/MM3 (ref 140–450)
PLATELET # BLD AUTO: 50 10*3/MM3 (ref 140–450)
PLATELET # BLD AUTO: 62 10*3/MM3 (ref 140–450)
PLATELET # BLD AUTO: 67 10*3/MM3 (ref 140–450)
PLATELET # BLD AUTO: 70 10*3/MM3 (ref 140–450)
PLATELET # BLD AUTO: 79 10*3/MM3 (ref 140–450)
PLATELET # BLD AUTO: 98 10*3/MM3 (ref 140–450)
PMV BLD AUTO: 10.2 FL (ref 6–12)
PMV BLD AUTO: 10.3 FL (ref 6–12)
PMV BLD AUTO: 10.3 FL (ref 6–12)
PMV BLD AUTO: 10.4 FL (ref 6–12)
PMV BLD AUTO: 10.5 FL (ref 6–12)
PMV BLD AUTO: 10.5 FL (ref 6–12)
PMV BLD AUTO: 10.8 FL (ref 6–12)
PMV BLD AUTO: 11.5 FL (ref 6–12)
PMV BLD AUTO: 11.9 FL (ref 6–12)
PMV BLD AUTO: 5.9 FL (ref 6–12)
PMV BLD AUTO: 6.4 FL (ref 6–12)
PMV BLD AUTO: 6.4 FL (ref 6–12)
PMV BLD AUTO: 6.6 FL (ref 6–12)
PMV BLD AUTO: 6.7 FL (ref 6–12)
PMV BLD AUTO: 6.8 FL (ref 6–12)
PMV BLD AUTO: 7.3 FL (ref 6–12)
PMV BLD AUTO: 9.8 FL (ref 6–12)
PMV BLD AUTO: 9.9 FL (ref 6–12)
POTASSIUM SERPL-SCNC: 3.3 MMOL/L (ref 3.5–5.2)
POTASSIUM SERPL-SCNC: 3.4 MMOL/L (ref 3.5–5.2)
POTASSIUM SERPL-SCNC: 3.6 MMOL/L (ref 3.5–5.2)
POTASSIUM SERPL-SCNC: 3.7 MMOL/L (ref 3.5–5.2)
POTASSIUM SERPL-SCNC: 3.8 MMOL/L (ref 3.5–5.2)
POTASSIUM SERPL-SCNC: 4 MMOL/L (ref 3.5–5.2)
POTASSIUM SERPL-SCNC: 4 MMOL/L (ref 3.5–5.2)
POTASSIUM SERPL-SCNC: 4.1 MMOL/L (ref 3.5–5.2)
POTASSIUM SERPL-SCNC: 4.2 MMOL/L (ref 3.5–5.2)
POTASSIUM SERPL-SCNC: 4.3 MMOL/L (ref 3.5–5.2)
POTASSIUM SERPL-SCNC: 4.3 MMOL/L (ref 3.5–5.2)
POTASSIUM SERPL-SCNC: 4.5 MMOL/L (ref 3.5–5.2)
POTASSIUM SERPL-SCNC: 4.6 MMOL/L (ref 3.5–5.2)
POTASSIUM SERPL-SCNC: 4.6 MMOL/L (ref 3.5–5.2)
POTASSIUM SERPL-SCNC: 4.7 MMOL/L (ref 3.5–5.2)
PROCALCITONIN SERPL-MCNC: 0.11 NG/ML (ref 0–0.25)
PROT SERPL-MCNC: 5.6 G/DL (ref 6–8.5)
PROT SERPL-MCNC: 6 G/DL (ref 6–8.5)
PROT SERPL-MCNC: 6.3 G/DL (ref 6–8.5)
PROT SERPL-MCNC: 6.4 G/DL (ref 6–8.5)
PROT SERPL-MCNC: 6.4 G/DL (ref 6–8.5)
PROT SERPL-MCNC: 6.5 G/DL (ref 6–8.5)
PROT SERPL-MCNC: 6.6 G/DL (ref 6–8.5)
PROT SERPL-MCNC: 6.9 G/DL (ref 6–8.5)
PROT SERPL-MCNC: 7.1 G/DL (ref 6–8.5)
PROT SERPL-MCNC: 7.3 G/DL (ref 6–8.5)
PROT UR QL STRIP: NEGATIVE
PROTHROMBIN TIME: 12.7 SECONDS (ref 11.4–14.4)
QT INTERVAL: 336 MS
QT INTERVAL: 360 MS
QTC INTERVAL: 430 MS
QTC INTERVAL: 433 MS
RBC # BLD AUTO: 2 10*6/MM3 (ref 3.77–5.28)
RBC # BLD AUTO: 2.07 10*6/MM3 (ref 3.77–5.28)
RBC # BLD AUTO: 2.33 10*6/MM3 (ref 3.77–5.28)
RBC # BLD AUTO: 2.72 10*6/MM3 (ref 3.77–5.28)
RBC # BLD AUTO: 2.8 10*6/MM3 (ref 3.77–5.28)
RBC # BLD AUTO: 2.9 10*6/MM3 (ref 3.77–5.28)
RBC # BLD AUTO: 2.91 10*6/MM3 (ref 3.77–5.28)
RBC # BLD AUTO: 2.91 10*6/MM3 (ref 3.77–5.28)
RBC # BLD AUTO: 2.95 10*6/MM3 (ref 3.77–5.28)
RBC # BLD AUTO: 2.98 10*6/MM3 (ref 3.77–5.28)
RBC # BLD AUTO: 3.02 10*6/MM3 (ref 3.77–5.28)
RBC # BLD AUTO: 3.07 10*6/MM3 (ref 3.77–5.28)
RBC # BLD AUTO: 3.2 10*6/MM3 (ref 3.77–5.28)
RBC # BLD AUTO: 3.22 10*6/MM3 (ref 3.77–5.28)
RBC # BLD AUTO: 3.22 10*6/MM3 (ref 3.77–5.28)
RBC # BLD AUTO: 3.31 10*6/MM3 (ref 3.77–5.28)
RBC # BLD AUTO: 3.64 10*6/MM3 (ref 3.77–5.28)
RBC # BLD AUTO: 3.68 10*6/MM3 (ref 3.77–5.28)
RBC # BLD AUTO: 4.02 10*6/MM3 (ref 3.77–5.28)
RBC MORPH BLD: NORMAL
RH BLD: NEGATIVE
SARS-COV-2 RDRP RESP QL NAA+PROBE: NORMAL
SARS-COV-2 RNA RESP QL NAA+PROBE: NOT DETECTED
SARS-COV-2 RNA RESP QL NAA+PROBE: NOT DETECTED
SODIUM SERPL-SCNC: 136 MMOL/L (ref 136–145)
SODIUM SERPL-SCNC: 137 MMOL/L (ref 136–145)
SODIUM SERPL-SCNC: 138 MMOL/L (ref 136–145)
SODIUM SERPL-SCNC: 139 MMOL/L (ref 136–145)
SODIUM SERPL-SCNC: 139 MMOL/L (ref 136–145)
SODIUM SERPL-SCNC: 140 MMOL/L (ref 136–145)
SODIUM SERPL-SCNC: 141 MMOL/L (ref 136–145)
SP GR UR STRIP: 1.01 (ref 1–1.03)
T&S EXPIRATION DATE: NORMAL
TIBC SERPL-MCNC: 213 MCG/DL (ref 298–536)
TRANSFERRIN SERPL-MCNC: 143 MG/DL (ref 200–360)
TROPONIN T SERPL-MCNC: <0.01 NG/ML (ref 0–0.03)
UNIT  ABO: NORMAL
UNIT  RH: NORMAL
UROBILINOGEN UR QL STRIP: NORMAL
WBC # BLD AUTO: 1.53 10*3/MM3 (ref 3.4–10.8)
WBC # BLD AUTO: 1.77 10*3/MM3 (ref 3.4–10.8)
WBC # BLD AUTO: 2 10*3/MM3 (ref 3.4–10.8)
WBC # BLD AUTO: 2.2 10*3/MM3 (ref 3.4–10.8)
WBC # BLD AUTO: 2.41 10*3/MM3 (ref 3.4–10.8)
WBC # BLD AUTO: 2.7 10*3/MM3 (ref 3.4–10.8)
WBC # BLD AUTO: 2.83 10*3/MM3 (ref 3.4–10.8)
WBC # BLD AUTO: 2.87 10*3/MM3 (ref 3.4–10.8)
WBC # BLD AUTO: 3.02 10*3/MM3 (ref 3.4–10.8)
WBC # BLD AUTO: 3.04 10*3/MM3 (ref 3.4–10.8)
WBC # BLD AUTO: 3.43 10*3/MM3 (ref 3.4–10.8)
WBC # BLD AUTO: 3.69 10*3/MM3 (ref 3.4–10.8)
WBC # BLD AUTO: 3.81 10*3/MM3 (ref 3.4–10.8)
WBC # BLD AUTO: 4 10*3/MM3 (ref 3.4–10.8)
WBC # BLD AUTO: 4.4 10*3/MM3 (ref 3.4–10.8)
WBC # BLD AUTO: 4.57 10*3/MM3 (ref 3.4–10.8)
WBC # BLD AUTO: 4.9 10*3/MM3 (ref 3.4–10.8)
WBC # BLD AUTO: 4.94 10*3/MM3 (ref 3.4–10.8)
WBC # BLD AUTO: 6.3 10*3/MM3 (ref 3.4–10.8)
WBC MORPH BLD: NORMAL
WHOLE BLOOD HOLD SPECIMEN: NORMAL

## 2021-01-01 PROCEDURE — 94799 UNLISTED PULMONARY SVC/PX: CPT

## 2021-01-01 PROCEDURE — 86900 BLOOD TYPING SEROLOGIC ABO: CPT

## 2021-01-01 PROCEDURE — 25010000002 DIPHENHYDRAMINE PER 50 MG: Performed by: OBSTETRICS & GYNECOLOGY

## 2021-01-01 PROCEDURE — 96417 CHEMO IV INFUS EACH ADDL SEQ: CPT

## 2021-01-01 PROCEDURE — 97165 OT EVAL LOW COMPLEX 30 MIN: CPT

## 2021-01-01 PROCEDURE — 86900 BLOOD TYPING SEROLOGIC ABO: CPT | Performed by: INTERNAL MEDICINE

## 2021-01-01 PROCEDURE — 86850 RBC ANTIBODY SCREEN: CPT | Performed by: INTERNAL MEDICINE

## 2021-01-01 PROCEDURE — 25010000002 PACLITAXEL PER 30 MG: Performed by: NURSE PRACTITIONER

## 2021-01-01 PROCEDURE — 99211 OFF/OP EST MAY X REQ PHY/QHP: CPT

## 2021-01-01 PROCEDURE — 80053 COMPREHEN METABOLIC PANEL: CPT | Performed by: EMERGENCY MEDICINE

## 2021-01-01 PROCEDURE — 80053 COMPREHEN METABOLIC PANEL: CPT

## 2021-01-01 PROCEDURE — 71045 X-RAY EXAM CHEST 1 VIEW: CPT

## 2021-01-01 PROCEDURE — 80053 COMPREHEN METABOLIC PANEL: CPT | Performed by: NURSE PRACTITIONER

## 2021-01-01 PROCEDURE — 99233 SBSQ HOSP IP/OBS HIGH 50: CPT | Performed by: INTERNAL MEDICINE

## 2021-01-01 PROCEDURE — 96415 CHEMO IV INFUSION ADDL HR: CPT

## 2021-01-01 PROCEDURE — 82962 GLUCOSE BLOOD TEST: CPT

## 2021-01-01 PROCEDURE — 25010000002 PALONOSETRON 0.25 MG/5ML SOLUTION PREFILLED SYRINGE: Performed by: OBSTETRICS & GYNECOLOGY

## 2021-01-01 PROCEDURE — 82565 ASSAY OF CREATININE: CPT

## 2021-01-01 PROCEDURE — 25010000002 MORPHINE PER 10 MG: Performed by: INTERNAL MEDICINE

## 2021-01-01 PROCEDURE — 63710000001 INSULIN LISPRO (HUMAN) PER 5 UNITS: Performed by: INTERNAL MEDICINE

## 2021-01-01 PROCEDURE — 74177 CT ABD & PELVIS W/CONTRAST: CPT

## 2021-01-01 PROCEDURE — P9016 RBC LEUKOCYTES REDUCED: HCPCS

## 2021-01-01 PROCEDURE — 82728 ASSAY OF FERRITIN: CPT | Performed by: INTERNAL MEDICINE

## 2021-01-01 PROCEDURE — 25010000002 LORAZEPAM PER 2 MG: Performed by: INTERNAL MEDICINE

## 2021-01-01 PROCEDURE — 99253 IP/OBS CNSLTJ NEW/EST LOW 45: CPT | Performed by: OBSTETRICS & GYNECOLOGY

## 2021-01-01 PROCEDURE — 25010000002 CARBOPLATIN PER 50 MG: Performed by: NURSE PRACTITIONER

## 2021-01-01 PROCEDURE — 83690 ASSAY OF LIPASE: CPT

## 2021-01-01 PROCEDURE — 63710000001 ONDANSETRON PER 8 MG: Performed by: INTERNAL MEDICINE

## 2021-01-01 PROCEDURE — 99215 OFFICE O/P EST HI 40 MIN: CPT | Performed by: OBSTETRICS & GYNECOLOGY

## 2021-01-01 PROCEDURE — 71275 CT ANGIOGRAPHY CHEST: CPT

## 2021-01-01 PROCEDURE — 36430 TRANSFUSION BLD/BLD COMPNT: CPT

## 2021-01-01 PROCEDURE — 86901 BLOOD TYPING SEROLOGIC RH(D): CPT | Performed by: EMERGENCY MEDICINE

## 2021-01-01 PROCEDURE — 25010000003 HEPARIN LOCK FLUSH PER 10 UNITS: Performed by: OBSTETRICS & GYNECOLOGY

## 2021-01-01 PROCEDURE — 85025 COMPLETE CBC W/AUTO DIFF WBC: CPT | Performed by: INTERNAL MEDICINE

## 2021-01-01 PROCEDURE — 96375 TX/PRO/DX INJ NEW DRUG ADDON: CPT

## 2021-01-01 PROCEDURE — 25010000002 FUROSEMIDE PER 20 MG: Performed by: NURSE PRACTITIONER

## 2021-01-01 PROCEDURE — 63710000001 DIPHENHYDRAMINE PER 50 MG: Performed by: OBSTETRICS & GYNECOLOGY

## 2021-01-01 PROCEDURE — 25010000002 DEXAMETHASONE PER 1 MG: Performed by: OBSTETRICS & GYNECOLOGY

## 2021-01-01 PROCEDURE — 84484 ASSAY OF TROPONIN QUANT: CPT | Performed by: EMERGENCY MEDICINE

## 2021-01-01 PROCEDURE — 25010000002 PACLITAXEL PER 1 MG: Performed by: OBSTETRICS & GYNECOLOGY

## 2021-01-01 PROCEDURE — 25010000002 FENTANYL CITRATE (PF) 100 MCG/2ML SOLUTION: Performed by: ANESTHESIOLOGY

## 2021-01-01 PROCEDURE — P9035 PLATELET PHERES LEUKOREDUCED: HCPCS

## 2021-01-01 PROCEDURE — 85025 COMPLETE CBC W/AUTO DIFF WBC: CPT | Performed by: OBSTETRICS & GYNECOLOGY

## 2021-01-01 PROCEDURE — 86900 BLOOD TYPING SEROLOGIC ABO: CPT | Performed by: OBSTETRICS & GYNECOLOGY

## 2021-01-01 PROCEDURE — 86901 BLOOD TYPING SEROLOGIC RH(D): CPT | Performed by: OBSTETRICS & GYNECOLOGY

## 2021-01-01 PROCEDURE — 81003 URINALYSIS AUTO W/O SCOPE: CPT

## 2021-01-01 PROCEDURE — 99223 1ST HOSP IP/OBS HIGH 75: CPT | Performed by: INTERNAL MEDICINE

## 2021-01-01 PROCEDURE — 86901 BLOOD TYPING SEROLOGIC RH(D): CPT | Performed by: NURSE PRACTITIONER

## 2021-01-01 PROCEDURE — 36561 INSERT TUNNELED CV CATH: CPT | Performed by: OBSTETRICS & GYNECOLOGY

## 2021-01-01 PROCEDURE — 86850 RBC ANTIBODY SCREEN: CPT | Performed by: EMERGENCY MEDICINE

## 2021-01-01 PROCEDURE — 25010000002 FOSAPREPITANT PER 1 MG: Performed by: OBSTETRICS & GYNECOLOGY

## 2021-01-01 PROCEDURE — 85007 BL SMEAR W/DIFF WBC COUNT: CPT | Performed by: INTERNAL MEDICINE

## 2021-01-01 PROCEDURE — U0003 INFECTIOUS AGENT DETECTION BY NUCLEIC ACID (DNA OR RNA); SEVERE ACUTE RESPIRATORY SYNDROME CORONAVIRUS 2 (SARS-COV-2) (CORONAVIRUS DISEASE [COVID-19]), AMPLIFIED PROBE TECHNIQUE, MAKING USE OF HIGH THROUGHPUT TECHNOLOGIES AS DESCRIBED BY CMS-2020-01-R: HCPCS | Performed by: FAMILY MEDICINE

## 2021-01-01 PROCEDURE — 96367 TX/PROPH/DG ADDL SEQ IV INF: CPT

## 2021-01-01 PROCEDURE — 96413 CHEMO IV INFUSION 1 HR: CPT

## 2021-01-01 PROCEDURE — 63710000001 PROMETHAZINE PER 12.5 MG: Performed by: INTERNAL MEDICINE

## 2021-01-01 PROCEDURE — 97116 GAIT TRAINING THERAPY: CPT | Performed by: PHYSICAL THERAPIST

## 2021-01-01 PROCEDURE — 99232 SBSQ HOSP IP/OBS MODERATE 35: CPT | Performed by: NURSE PRACTITIONER

## 2021-01-01 PROCEDURE — 99231 SBSQ HOSP IP/OBS SF/LOW 25: CPT | Performed by: OBSTETRICS & GYNECOLOGY

## 2021-01-01 PROCEDURE — 25010000002 PROPOFOL 10 MG/ML EMULSION: Performed by: NURSE ANESTHETIST, CERTIFIED REGISTERED

## 2021-01-01 PROCEDURE — 94640 AIRWAY INHALATION TREATMENT: CPT

## 2021-01-01 PROCEDURE — 86850 RBC ANTIBODY SCREEN: CPT | Performed by: NURSE PRACTITIONER

## 2021-01-01 PROCEDURE — 80053 COMPREHEN METABOLIC PANEL: CPT | Performed by: INTERNAL MEDICINE

## 2021-01-01 PROCEDURE — 97110 THERAPEUTIC EXERCISES: CPT | Performed by: PHYSICAL THERAPIST

## 2021-01-01 PROCEDURE — 85025 COMPLETE CBC W/AUTO DIFF WBC: CPT | Performed by: NURSE PRACTITIONER

## 2021-01-01 PROCEDURE — 85025 COMPLETE CBC W/AUTO DIFF WBC: CPT

## 2021-01-01 PROCEDURE — 84100 ASSAY OF PHOSPHORUS: CPT | Performed by: INTERNAL MEDICINE

## 2021-01-01 PROCEDURE — 93005 ELECTROCARDIOGRAM TRACING: CPT

## 2021-01-01 PROCEDURE — C9803 HOPD COVID-19 SPEC COLLECT: HCPCS

## 2021-01-01 PROCEDURE — 84484 ASSAY OF TROPONIN QUANT: CPT

## 2021-01-01 PROCEDURE — 93005 ELECTROCARDIOGRAM TRACING: CPT | Performed by: EMERGENCY MEDICINE

## 2021-01-01 PROCEDURE — 25010000002 FENTANYL CITRATE (PF) 100 MCG/2ML SOLUTION: Performed by: EMERGENCY MEDICINE

## 2021-01-01 PROCEDURE — 86923 COMPATIBILITY TEST ELECTRIC: CPT

## 2021-01-01 PROCEDURE — 97161 PT EVAL LOW COMPLEX 20 MIN: CPT | Performed by: PHYSICAL THERAPIST

## 2021-01-01 PROCEDURE — 97166 OT EVAL MOD COMPLEX 45 MIN: CPT

## 2021-01-01 PROCEDURE — 25010000002 HEPARIN LOCK FLUSH PER 10 UNITS: Performed by: OBSTETRICS & GYNECOLOGY

## 2021-01-01 PROCEDURE — 97535 SELF CARE MNGMENT TRAINING: CPT

## 2021-01-01 PROCEDURE — 25010000002 CARBOPLATIN PER 50 MG: Performed by: OBSTETRICS & GYNECOLOGY

## 2021-01-01 PROCEDURE — 85007 BL SMEAR W/DIFF WBC COUNT: CPT | Performed by: EMERGENCY MEDICINE

## 2021-01-01 PROCEDURE — 25010000002 HEPARIN LOCK FLUSH PER 10 UNITS: Performed by: INTERNAL MEDICINE

## 2021-01-01 PROCEDURE — 83036 HEMOGLOBIN GLYCOSYLATED A1C: CPT | Performed by: NURSE PRACTITIONER

## 2021-01-01 PROCEDURE — 99239 HOSP IP/OBS DSCHRG MGMT >30: CPT | Performed by: INTERNAL MEDICINE

## 2021-01-01 PROCEDURE — 83540 ASSAY OF IRON: CPT | Performed by: INTERNAL MEDICINE

## 2021-01-01 PROCEDURE — 63710000001 DIPHENHYDRAMINE PER 50 MG: Performed by: NURSE PRACTITIONER

## 2021-01-01 PROCEDURE — U0004 COV-19 TEST NON-CDC HGH THRU: HCPCS

## 2021-01-01 PROCEDURE — 25010000002 DIPHENHYDRAMINE PER 50 MG: Performed by: NURSE PRACTITIONER

## 2021-01-01 PROCEDURE — G0463 HOSPITAL OUTPT CLINIC VISIT: HCPCS

## 2021-01-01 PROCEDURE — 86900 BLOOD TYPING SEROLOGIC ABO: CPT | Performed by: EMERGENCY MEDICINE

## 2021-01-01 PROCEDURE — 80048 BASIC METABOLIC PNL TOTAL CA: CPT | Performed by: INTERNAL MEDICINE

## 2021-01-01 PROCEDURE — 80053 COMPREHEN METABOLIC PANEL: CPT | Performed by: OBSTETRICS & GYNECOLOGY

## 2021-01-01 PROCEDURE — 83735 ASSAY OF MAGNESIUM: CPT | Performed by: INTERNAL MEDICINE

## 2021-01-01 PROCEDURE — 25010000002 DEXAMETHASONE PER 1 MG: Performed by: NURSE PRACTITIONER

## 2021-01-01 PROCEDURE — 85730 THROMBOPLASTIN TIME PARTIAL: CPT | Performed by: INTERNAL MEDICINE

## 2021-01-01 PROCEDURE — 96365 THER/PROPH/DIAG IV INF INIT: CPT

## 2021-01-01 PROCEDURE — 25010000002 FOSAPREPITANT PER 1 MG: Performed by: NURSE PRACTITIONER

## 2021-01-01 PROCEDURE — 0DB68ZX EXCISION OF STOMACH, VIA NATURAL OR ARTIFICIAL OPENING ENDOSCOPIC, DIAGNOSTIC: ICD-10-PCS | Performed by: INTERNAL MEDICINE

## 2021-01-01 PROCEDURE — 96366 THER/PROPH/DIAG IV INF ADDON: CPT

## 2021-01-01 PROCEDURE — 96376 TX/PRO/DX INJ SAME DRUG ADON: CPT

## 2021-01-01 PROCEDURE — 83880 ASSAY OF NATRIURETIC PEPTIDE: CPT | Performed by: EMERGENCY MEDICINE

## 2021-01-01 PROCEDURE — 99221 1ST HOSP IP/OBS SF/LOW 40: CPT | Performed by: PHYSICIAN ASSISTANT

## 2021-01-01 PROCEDURE — 71260 CT THORAX DX C+: CPT

## 2021-01-01 PROCEDURE — 99232 SBSQ HOSP IP/OBS MODERATE 35: CPT | Performed by: OBSTETRICS & GYNECOLOGY

## 2021-01-01 PROCEDURE — 96360 HYDRATION IV INFUSION INIT: CPT

## 2021-01-01 PROCEDURE — 85007 BL SMEAR W/DIFF WBC COUNT: CPT

## 2021-01-01 PROCEDURE — 99284 EMERGENCY DEPT VISIT MOD MDM: CPT

## 2021-01-01 PROCEDURE — 99232 SBSQ HOSP IP/OBS MODERATE 35: CPT | Performed by: INTERNAL MEDICINE

## 2021-01-01 PROCEDURE — 88305 TISSUE EXAM BY PATHOLOGIST: CPT | Performed by: INTERNAL MEDICINE

## 2021-01-01 PROCEDURE — 84145 PROCALCITONIN (PCT): CPT | Performed by: EMERGENCY MEDICINE

## 2021-01-01 PROCEDURE — 87635 SARS-COV-2 COVID-19 AMP PRB: CPT | Performed by: INTERNAL MEDICINE

## 2021-01-01 PROCEDURE — 85025 COMPLETE CBC W/AUTO DIFF WBC: CPT | Performed by: EMERGENCY MEDICINE

## 2021-01-01 PROCEDURE — 25010000002 MAGNESIUM SULFATE 2 GM/50ML SOLUTION: Performed by: INTERNAL MEDICINE

## 2021-01-01 PROCEDURE — 86901 BLOOD TYPING SEROLOGIC RH(D): CPT | Performed by: INTERNAL MEDICINE

## 2021-01-01 PROCEDURE — 83605 ASSAY OF LACTIC ACID: CPT

## 2021-01-01 PROCEDURE — 85027 COMPLETE CBC AUTOMATED: CPT | Performed by: INTERNAL MEDICINE

## 2021-01-01 PROCEDURE — 85610 PROTHROMBIN TIME: CPT | Performed by: INTERNAL MEDICINE

## 2021-01-01 PROCEDURE — 84466 ASSAY OF TRANSFERRIN: CPT | Performed by: INTERNAL MEDICINE

## 2021-01-01 PROCEDURE — 92610 EVALUATE SWALLOWING FUNCTION: CPT

## 2021-01-01 PROCEDURE — 77001 FLUOROGUIDE FOR VEIN DEVICE: CPT | Performed by: OBSTETRICS & GYNECOLOGY

## 2021-01-01 PROCEDURE — 25010000002 IOPAMIDOL 61 % SOLUTION: Performed by: OBSTETRICS & GYNECOLOGY

## 2021-01-01 PROCEDURE — 99283 EMERGENCY DEPT VISIT LOW MDM: CPT

## 2021-01-01 PROCEDURE — 83880 ASSAY OF NATRIURETIC PEPTIDE: CPT

## 2021-01-01 PROCEDURE — 86900 BLOOD TYPING SEROLOGIC ABO: CPT | Performed by: NURSE PRACTITIONER

## 2021-01-01 PROCEDURE — 84132 ASSAY OF SERUM POTASSIUM: CPT | Performed by: INTERNAL MEDICINE

## 2021-01-01 PROCEDURE — 36415 COLL VENOUS BLD VENIPUNCTURE: CPT

## 2021-01-01 PROCEDURE — 86850 RBC ANTIBODY SCREEN: CPT | Performed by: OBSTETRICS & GYNECOLOGY

## 2021-01-01 PROCEDURE — 97162 PT EVAL MOD COMPLEX 30 MIN: CPT

## 2021-01-01 PROCEDURE — 25010000002 PALONOSETRON 0.25 MG/5ML SOLUTION PREFILLED SYRINGE: Performed by: NURSE PRACTITIONER

## 2021-01-01 PROCEDURE — 0 IOPAMIDOL PER 1 ML: Performed by: EMERGENCY MEDICINE

## 2021-01-01 PROCEDURE — 43239 EGD BIOPSY SINGLE/MULTIPLE: CPT | Performed by: INTERNAL MEDICINE

## 2021-01-01 RX ORDER — SODIUM CHLORIDE 9 MG/ML
250 INJECTION, SOLUTION INTRAVENOUS ONCE
Status: COMPLETED | OUTPATIENT
Start: 2021-01-01 | End: 2021-01-01

## 2021-01-01 RX ORDER — FENTANYL CITRATE 50 UG/ML
25 INJECTION, SOLUTION INTRAMUSCULAR; INTRAVENOUS
Status: DISCONTINUED | OUTPATIENT
Start: 2021-01-01 | End: 2021-01-01

## 2021-01-01 RX ORDER — SODIUM CHLORIDE 9 MG/ML
250 INJECTION, SOLUTION INTRAVENOUS AS NEEDED
Status: CANCELLED | OUTPATIENT
Start: 2021-01-01

## 2021-01-01 RX ORDER — SODIUM CHLORIDE 9 MG/ML
1000 INJECTION, SOLUTION INTRAVENOUS CONTINUOUS
Status: CANCELLED | OUTPATIENT
Start: 2021-01-01

## 2021-01-01 RX ORDER — SODIUM CHLORIDE 9 MG/ML
10 INJECTION INTRAVENOUS AS NEEDED
Status: DISCONTINUED | OUTPATIENT
Start: 2021-01-01 | End: 2021-01-01 | Stop reason: HOSPADM

## 2021-01-01 RX ORDER — MAGNESIUM SULFATE HEPTAHYDRATE 40 MG/ML
2 INJECTION, SOLUTION INTRAVENOUS AS NEEDED
Status: DISCONTINUED | OUTPATIENT
Start: 2021-01-01 | End: 2021-01-01 | Stop reason: HOSPADM

## 2021-01-01 RX ORDER — DIPHENHYDRAMINE HYDROCHLORIDE 50 MG/ML
50 INJECTION INTRAMUSCULAR; INTRAVENOUS AS NEEDED
Status: CANCELLED | OUTPATIENT
Start: 2021-01-01

## 2021-01-01 RX ORDER — SODIUM CHLORIDE 9 MG/ML
1000 INJECTION, SOLUTION INTRAVENOUS CONTINUOUS
Status: ACTIVE | OUTPATIENT
Start: 2021-01-01 | End: 2021-01-01

## 2021-01-01 RX ORDER — SODIUM CHLORIDE 0.9 % (FLUSH) 0.9 %
10 SYRINGE (ML) INJECTION AS NEEDED
Status: DISCONTINUED | OUTPATIENT
Start: 2021-01-01 | End: 2021-01-01 | Stop reason: HOSPADM

## 2021-01-01 RX ORDER — HEPARIN SODIUM (PORCINE) LOCK FLUSH IV SOLN 100 UNIT/ML 100 UNIT/ML
500 SOLUTION INTRAVENOUS ONCE
Status: COMPLETED | OUTPATIENT
Start: 2021-01-01 | End: 2021-01-01

## 2021-01-01 RX ORDER — MORPHINE SULFATE 2 MG/ML
1 INJECTION, SOLUTION INTRAMUSCULAR; INTRAVENOUS EVERY 4 HOURS PRN
Status: DISCONTINUED | OUTPATIENT
Start: 2021-01-01 | End: 2021-01-01

## 2021-01-01 RX ORDER — OXYBUTYNIN CHLORIDE 5 MG/1
5 TABLET, EXTENDED RELEASE ORAL DAILY
Status: DISCONTINUED | OUTPATIENT
Start: 2021-01-01 | End: 2021-01-01 | Stop reason: HOSPADM

## 2021-01-01 RX ORDER — AMLODIPINE BESYLATE 5 MG/1
5 TABLET ORAL
Status: DISCONTINUED | OUTPATIENT
Start: 2021-01-01 | End: 2021-01-01 | Stop reason: HOSPADM

## 2021-01-01 RX ORDER — NICOTINE 21 MG/24HR
1 PATCH, TRANSDERMAL 24 HOURS TRANSDERMAL
Status: DISCONTINUED | OUTPATIENT
Start: 2021-01-01 | End: 2021-01-01 | Stop reason: HOSPADM

## 2021-01-01 RX ORDER — BISACODYL 10 MG
10 SUPPOSITORY, RECTAL RECTAL DAILY PRN
Status: DISCONTINUED | OUTPATIENT
Start: 2021-01-01 | End: 2021-01-01 | Stop reason: HOSPADM

## 2021-01-01 RX ORDER — SODIUM CHLORIDE 9 MG/ML
250 INJECTION, SOLUTION INTRAVENOUS ONCE
Status: CANCELLED | OUTPATIENT
Start: 2021-01-01

## 2021-01-01 RX ORDER — DIPHENHYDRAMINE HCL 25 MG
25 CAPSULE ORAL ONCE
Status: COMPLETED | OUTPATIENT
Start: 2021-01-01 | End: 2021-01-01

## 2021-01-01 RX ORDER — BUPIVACAINE HCL/0.9 % NACL/PF 0.125 %
PLASTIC BAG, INJECTION (ML) EPIDURAL AS NEEDED
Status: DISCONTINUED | OUTPATIENT
Start: 2021-01-01 | End: 2021-01-01 | Stop reason: SURG

## 2021-01-01 RX ORDER — HEPARIN SODIUM (PORCINE) LOCK FLUSH IV SOLN 100 UNIT/ML 100 UNIT/ML
500 SOLUTION INTRAVENOUS AS NEEDED
Status: DISCONTINUED | OUTPATIENT
Start: 2021-01-01 | End: 2021-01-01 | Stop reason: HOSPADM

## 2021-01-01 RX ORDER — HEPARIN SODIUM (PORCINE) LOCK FLUSH IV SOLN 100 UNIT/ML 100 UNIT/ML
500 SOLUTION INTRAVENOUS AS NEEDED
Status: CANCELLED | OUTPATIENT
Start: 2021-01-01

## 2021-01-01 RX ORDER — FAMOTIDINE 10 MG/ML
20 INJECTION, SOLUTION INTRAVENOUS ONCE
Status: CANCELLED | OUTPATIENT
Start: 2021-01-01

## 2021-01-01 RX ORDER — DEXTROSE MONOHYDRATE 25 G/50ML
25 INJECTION, SOLUTION INTRAVENOUS
Status: DISCONTINUED | OUTPATIENT
Start: 2021-01-01 | End: 2021-01-01 | Stop reason: HOSPADM

## 2021-01-01 RX ORDER — NICOTINE 21 MG/24HR
1 PATCH, TRANSDERMAL 24 HOURS TRANSDERMAL
Qty: 30 PATCH | Refills: 0 | Status: SHIPPED | OUTPATIENT
Start: 2021-01-01

## 2021-01-01 RX ORDER — CALCIUM CARBONATE 200(500)MG
2 TABLET,CHEWABLE ORAL 3 TIMES DAILY PRN
Qty: 180 TABLET | Refills: 0 | Status: SHIPPED | OUTPATIENT
Start: 2021-01-01

## 2021-01-01 RX ORDER — FAMOTIDINE 10 MG/ML
20 INJECTION, SOLUTION INTRAVENOUS ONCE
Status: COMPLETED | OUTPATIENT
Start: 2021-01-01 | End: 2021-01-01

## 2021-01-01 RX ORDER — ALBUTEROL SULFATE 2.5 MG/3ML
2.5 SOLUTION RESPIRATORY (INHALATION)
Status: DISCONTINUED | OUTPATIENT
Start: 2021-01-01 | End: 2021-01-01 | Stop reason: HOSPADM

## 2021-01-01 RX ORDER — PALONOSETRON 0.05 MG/ML
0.25 INJECTION, SOLUTION INTRAVENOUS ONCE
Status: COMPLETED | OUTPATIENT
Start: 2021-01-01 | End: 2021-01-01

## 2021-01-01 RX ORDER — ACETAMINOPHEN 325 MG/1
650 TABLET ORAL EVERY 4 HOURS PRN
Status: DISCONTINUED | OUTPATIENT
Start: 2021-01-01 | End: 2021-01-01 | Stop reason: HOSPADM

## 2021-01-01 RX ORDER — GABAPENTIN 300 MG/1
300 CAPSULE ORAL EVERY 8 HOURS SCHEDULED
Status: DISCONTINUED | OUTPATIENT
Start: 2021-01-01 | End: 2021-01-01 | Stop reason: HOSPADM

## 2021-01-01 RX ORDER — SULFAMETHOXAZOLE AND TRIMETHOPRIM 800; 160 MG/1; MG/1
1 TABLET ORAL 2 TIMES DAILY
Qty: 28 TABLET | Refills: 0 | Status: SHIPPED | OUTPATIENT
Start: 2021-01-01 | End: 2021-01-01

## 2021-01-01 RX ORDER — POTASSIUM CHLORIDE 1.5 G/1.77G
40 POWDER, FOR SOLUTION ORAL AS NEEDED
Status: DISCONTINUED | OUTPATIENT
Start: 2021-01-01 | End: 2021-01-01 | Stop reason: HOSPADM

## 2021-01-01 RX ORDER — PANTOPRAZOLE SODIUM 40 MG/1
40 TABLET, DELAYED RELEASE ORAL
Status: DISCONTINUED | OUTPATIENT
Start: 2021-01-01 | End: 2021-01-01 | Stop reason: HOSPADM

## 2021-01-01 RX ORDER — PROPOFOL 10 MG/ML
VIAL (ML) INTRAVENOUS AS NEEDED
Status: DISCONTINUED | OUTPATIENT
Start: 2021-01-01 | End: 2021-01-01 | Stop reason: SURG

## 2021-01-01 RX ORDER — SODIUM CHLORIDE 9 MG/ML
30 INJECTION, SOLUTION INTRAVENOUS CONTINUOUS PRN
Status: DISCONTINUED | OUTPATIENT
Start: 2021-01-01 | End: 2021-01-01 | Stop reason: HOSPADM

## 2021-01-01 RX ORDER — ONDANSETRON 4 MG/1
4 TABLET, FILM COATED ORAL EVERY 6 HOURS PRN
Status: DISCONTINUED | OUTPATIENT
Start: 2021-01-01 | End: 2021-01-01 | Stop reason: HOSPADM

## 2021-01-01 RX ORDER — FLUCONAZOLE 150 MG/1
150 TABLET ORAL ONCE
Qty: 1 TABLET | Refills: 0 | Status: SHIPPED | OUTPATIENT
Start: 2021-01-01 | End: 2021-01-01

## 2021-01-01 RX ORDER — SIMVASTATIN 40 MG
40 TABLET ORAL DAILY
COMMUNITY
Start: 2021-01-01

## 2021-01-01 RX ORDER — ATORVASTATIN CALCIUM 20 MG/1
20 TABLET, FILM COATED ORAL NIGHTLY
Status: DISCONTINUED | OUTPATIENT
Start: 2021-01-01 | End: 2021-01-01 | Stop reason: HOSPADM

## 2021-01-01 RX ORDER — SODIUM CHLORIDE 9 MG/ML
100 INJECTION, SOLUTION INTRAVENOUS CONTINUOUS
Status: DISCONTINUED | OUTPATIENT
Start: 2021-01-01 | End: 2021-01-01

## 2021-01-01 RX ORDER — PROMETHAZINE HYDROCHLORIDE 12.5 MG/1
6.25 TABLET ORAL EVERY 6 HOURS PRN
Status: DISCONTINUED | OUTPATIENT
Start: 2021-01-01 | End: 2021-01-01 | Stop reason: HOSPADM

## 2021-01-01 RX ORDER — NYSTATIN 100000 [USP'U]/G
POWDER TOPICAL 3 TIMES DAILY
Status: DISCONTINUED | OUTPATIENT
Start: 2021-01-01 | End: 2021-01-01 | Stop reason: HOSPADM

## 2021-01-01 RX ORDER — DIPHENHYDRAMINE HCL 25 MG
25 TABLET ORAL ONCE
Status: CANCELLED | OUTPATIENT
Start: 2021-01-01 | End: 2021-01-01

## 2021-01-01 RX ORDER — FAMOTIDINE 10 MG/ML
20 INJECTION, SOLUTION INTRAVENOUS AS NEEDED
Status: CANCELLED | OUTPATIENT
Start: 2021-01-01

## 2021-01-01 RX ORDER — SODIUM CHLORIDE 9 MG/ML
250 INJECTION, SOLUTION INTRAVENOUS AS NEEDED
Status: DISCONTINUED | OUTPATIENT
Start: 2021-01-01 | End: 2021-01-01 | Stop reason: HOSPADM

## 2021-01-01 RX ORDER — ACETAMINOPHEN 325 MG/1
650 TABLET ORAL ONCE
Status: COMPLETED | OUTPATIENT
Start: 2021-01-01 | End: 2021-01-01

## 2021-01-01 RX ORDER — CALCIUM CARBONATE 200(500)MG
2 TABLET,CHEWABLE ORAL 3 TIMES DAILY PRN
Status: DISCONTINUED | OUTPATIENT
Start: 2021-01-01 | End: 2021-01-01 | Stop reason: HOSPADM

## 2021-01-01 RX ORDER — ACETAMINOPHEN 325 MG/1
650 TABLET ORAL ONCE
Status: CANCELLED | OUTPATIENT
Start: 2021-01-01 | End: 2021-01-01

## 2021-01-01 RX ORDER — LORAZEPAM 2 MG/ML
1 INJECTION INTRAMUSCULAR EVERY 4 HOURS PRN
Status: DISCONTINUED | OUTPATIENT
Start: 2021-01-01 | End: 2021-01-01

## 2021-01-01 RX ORDER — POTASSIUM CHLORIDE 750 MG/1
40 CAPSULE, EXTENDED RELEASE ORAL AS NEEDED
Status: DISCONTINUED | OUTPATIENT
Start: 2021-01-01 | End: 2021-01-01 | Stop reason: HOSPADM

## 2021-01-01 RX ORDER — ACETAMINOPHEN 160 MG/5ML
650 SOLUTION ORAL EVERY 4 HOURS PRN
Status: DISCONTINUED | OUTPATIENT
Start: 2021-01-01 | End: 2021-01-01 | Stop reason: HOSPADM

## 2021-01-01 RX ORDER — MORPHINE SULFATE 2 MG/ML
1 INJECTION, SOLUTION INTRAMUSCULAR; INTRAVENOUS ONCE
Status: COMPLETED | OUTPATIENT
Start: 2021-01-01 | End: 2021-01-01

## 2021-01-01 RX ORDER — ESOMEPRAZOLE MAGNESIUM 40 MG/1
40 CAPSULE, DELAYED RELEASE ORAL 2 TIMES DAILY
Qty: 60 CAPSULE | Refills: 0 | Status: SHIPPED | OUTPATIENT
Start: 2021-01-01 | End: 2021-01-01 | Stop reason: HOSPADM

## 2021-01-01 RX ORDER — AMOXICILLIN 250 MG
2 CAPSULE ORAL 2 TIMES DAILY
Status: DISCONTINUED | OUTPATIENT
Start: 2021-01-01 | End: 2021-01-01 | Stop reason: HOSPADM

## 2021-01-01 RX ORDER — PALONOSETRON 0.05 MG/ML
0.25 INJECTION, SOLUTION INTRAVENOUS ONCE
Status: CANCELLED | OUTPATIENT
Start: 2021-01-01

## 2021-01-01 RX ORDER — MORPHINE SULFATE 2 MG/ML
1 INJECTION, SOLUTION INTRAMUSCULAR; INTRAVENOUS EVERY 6 HOURS PRN
Status: DISCONTINUED | OUTPATIENT
Start: 2021-01-01 | End: 2021-01-01 | Stop reason: HOSPADM

## 2021-01-01 RX ORDER — ACETAMINOPHEN 325 MG/1
325 TABLET ORAL ONCE
Status: CANCELLED | OUTPATIENT
Start: 2021-01-01 | End: 2021-01-01

## 2021-01-01 RX ORDER — SODIUM CHLORIDE 0.9 % (FLUSH) 0.9 %
1-10 SYRINGE (ML) INJECTION AS NEEDED
Status: DISCONTINUED | OUTPATIENT
Start: 2021-01-01 | End: 2021-01-01 | Stop reason: HOSPADM

## 2021-01-01 RX ORDER — ASPIRIN 81 MG/1
81 TABLET ORAL DAILY
Status: DISCONTINUED | OUTPATIENT
Start: 2021-01-01 | End: 2021-01-01 | Stop reason: HOSPADM

## 2021-01-01 RX ORDER — ALBUTEROL SULFATE 90 UG/1
2 AEROSOL, METERED RESPIRATORY (INHALATION) ONCE
Status: COMPLETED | OUTPATIENT
Start: 2021-01-01 | End: 2021-01-01

## 2021-01-01 RX ORDER — PANTOPRAZOLE SODIUM 40 MG/1
40 TABLET, DELAYED RELEASE ORAL DAILY
Refills: 0 | Status: DISCONTINUED | OUTPATIENT
Start: 2021-01-01 | End: 2021-01-01

## 2021-01-01 RX ORDER — SODIUM CHLORIDE, SODIUM LACTATE, POTASSIUM CHLORIDE, CALCIUM CHLORIDE 600; 310; 30; 20 MG/100ML; MG/100ML; MG/100ML; MG/100ML
75 INJECTION, SOLUTION INTRAVENOUS CONTINUOUS
Status: DISCONTINUED | OUTPATIENT
Start: 2021-01-01 | End: 2021-01-01

## 2021-01-01 RX ORDER — ASPIRIN 81 MG/1
324 TABLET, CHEWABLE ORAL ONCE
Status: DISCONTINUED | OUTPATIENT
Start: 2021-01-01 | End: 2021-01-01

## 2021-01-01 RX ORDER — AMOXICILLIN AND CLAVULANATE POTASSIUM 875; 125 MG/1; MG/1
1 TABLET, FILM COATED ORAL 2 TIMES DAILY
Qty: 14 TABLET | Refills: 0 | Status: SHIPPED | OUTPATIENT
Start: 2021-01-01 | End: 2021-01-01

## 2021-01-01 RX ORDER — SODIUM CHLORIDE 9 MG/ML
1000 INJECTION, SOLUTION INTRAVENOUS CONTINUOUS
Status: CANCELLED
Start: 2021-01-01

## 2021-01-01 RX ORDER — AMLODIPINE BESYLATE 5 MG/1
5 TABLET ORAL
Qty: 30 TABLET | Refills: 0 | Status: SHIPPED | OUTPATIENT
Start: 2021-01-01

## 2021-01-01 RX ORDER — OXYCODONE HYDROCHLORIDE 5 MG/1
5 TABLET ORAL EVERY 4 HOURS PRN
Status: DISCONTINUED | OUTPATIENT
Start: 2021-01-01 | End: 2021-01-01 | Stop reason: HOSPADM

## 2021-01-01 RX ORDER — FERROUS SULFATE 325(65) MG
325 TABLET ORAL
Status: DISCONTINUED | OUTPATIENT
Start: 2021-01-01 | End: 2021-01-01 | Stop reason: HOSPADM

## 2021-01-01 RX ORDER — SODIUM CHLORIDE 9 MG/ML
75 INJECTION, SOLUTION INTRAVENOUS CONTINUOUS
Status: DISCONTINUED | OUTPATIENT
Start: 2021-01-01 | End: 2021-01-01

## 2021-01-01 RX ORDER — DULOXETIN HYDROCHLORIDE 20 MG/1
20 CAPSULE, DELAYED RELEASE ORAL DAILY
Status: DISCONTINUED | OUTPATIENT
Start: 2021-01-01 | End: 2021-01-01 | Stop reason: HOSPADM

## 2021-01-01 RX ORDER — GABAPENTIN 300 MG/1
300 CAPSULE ORAL 3 TIMES DAILY
Status: DISCONTINUED | OUTPATIENT
Start: 2021-01-01 | End: 2021-01-01 | Stop reason: HOSPADM

## 2021-01-01 RX ORDER — HEPARIN SODIUM (PORCINE) LOCK FLUSH IV SOLN 100 UNIT/ML 100 UNIT/ML
500 SOLUTION INTRAVENOUS ONCE AS NEEDED
Status: COMPLETED | OUTPATIENT
Start: 2021-01-01 | End: 2021-01-01

## 2021-01-01 RX ORDER — OXYCODONE HYDROCHLORIDE 5 MG/1
5 TABLET ORAL EVERY 4 HOURS PRN
Qty: 30 TABLET | Refills: 0 | Status: SHIPPED | OUTPATIENT
Start: 2021-01-01 | End: 2021-01-01

## 2021-01-01 RX ORDER — SODIUM CHLORIDE 0.9 % (FLUSH) 0.9 %
10 SYRINGE (ML) INJECTION EVERY 12 HOURS SCHEDULED
Status: DISCONTINUED | OUTPATIENT
Start: 2021-01-01 | End: 2021-01-01 | Stop reason: HOSPADM

## 2021-01-01 RX ORDER — IPRATROPIUM BROMIDE AND ALBUTEROL SULFATE 2.5; .5 MG/3ML; MG/3ML
3 SOLUTION RESPIRATORY (INHALATION) ONCE AS NEEDED
Status: DISCONTINUED | OUTPATIENT
Start: 2021-01-01 | End: 2021-01-01

## 2021-01-01 RX ORDER — POLYETHYLENE GLYCOL 3350 17 G/17G
17 POWDER, FOR SOLUTION ORAL DAILY PRN
Status: DISCONTINUED | OUTPATIENT
Start: 2021-01-01 | End: 2021-01-01 | Stop reason: HOSPADM

## 2021-01-01 RX ORDER — PANTOPRAZOLE SODIUM 40 MG/1
40 TABLET, DELAYED RELEASE ORAL
Qty: 60 TABLET | Refills: 0 | Status: SHIPPED | OUTPATIENT
Start: 2021-01-01

## 2021-01-01 RX ORDER — MAGNESIUM SULFATE HEPTAHYDRATE 40 MG/ML
4 INJECTION, SOLUTION INTRAVENOUS AS NEEDED
Status: DISCONTINUED | OUTPATIENT
Start: 2021-01-01 | End: 2021-01-01 | Stop reason: HOSPADM

## 2021-01-01 RX ORDER — ACETAMINOPHEN 650 MG/1
650 SUPPOSITORY RECTAL EVERY 4 HOURS PRN
Status: DISCONTINUED | OUTPATIENT
Start: 2021-01-01 | End: 2021-01-01 | Stop reason: HOSPADM

## 2021-01-01 RX ORDER — NICOTINE POLACRILEX 4 MG
15 LOZENGE BUCCAL
Status: DISCONTINUED | OUTPATIENT
Start: 2021-01-01 | End: 2021-01-01 | Stop reason: HOSPADM

## 2021-01-01 RX ORDER — LORAZEPAM 0.5 MG/1
0.5 TABLET ORAL NIGHTLY PRN
Status: DISCONTINUED | OUTPATIENT
Start: 2021-01-01 | End: 2021-01-01 | Stop reason: HOSPADM

## 2021-01-01 RX ORDER — POTASSIUM CHLORIDE 7.45 MG/ML
10 INJECTION INTRAVENOUS
Status: DISCONTINUED | OUTPATIENT
Start: 2021-01-01 | End: 2021-01-01 | Stop reason: HOSPADM

## 2021-01-01 RX ORDER — DULOXETIN HYDROCHLORIDE 60 MG/1
60 CAPSULE, DELAYED RELEASE ORAL DAILY
Status: DISCONTINUED | OUTPATIENT
Start: 2021-01-01 | End: 2021-01-01 | Stop reason: HOSPADM

## 2021-01-01 RX ORDER — ACETAMINOPHEN 325 MG/1
325 TABLET ORAL ONCE
Status: COMPLETED | OUTPATIENT
Start: 2021-01-01 | End: 2021-01-01

## 2021-01-01 RX ORDER — ONDANSETRON 2 MG/ML
4 INJECTION INTRAMUSCULAR; INTRAVENOUS ONCE AS NEEDED
Status: DISCONTINUED | OUTPATIENT
Start: 2021-01-01 | End: 2021-01-01

## 2021-01-01 RX ORDER — FUROSEMIDE 10 MG/ML
20 INJECTION INTRAMUSCULAR; INTRAVENOUS ONCE
Status: COMPLETED | OUTPATIENT
Start: 2021-01-01 | End: 2021-01-01

## 2021-01-01 RX ORDER — BISACODYL 5 MG/1
5 TABLET, DELAYED RELEASE ORAL DAILY PRN
Status: DISCONTINUED | OUTPATIENT
Start: 2021-01-01 | End: 2021-01-01 | Stop reason: HOSPADM

## 2021-01-01 RX ORDER — ONDANSETRON 2 MG/ML
4 INJECTION INTRAMUSCULAR; INTRAVENOUS EVERY 6 HOURS PRN
Status: DISCONTINUED | OUTPATIENT
Start: 2021-01-01 | End: 2021-01-01 | Stop reason: HOSPADM

## 2021-01-01 RX ORDER — ALBUTEROL SULFATE 2.5 MG/3ML
2.5 SOLUTION RESPIRATORY (INHALATION) EVERY 4 HOURS PRN
Status: DISCONTINUED | OUTPATIENT
Start: 2021-01-01 | End: 2021-01-01 | Stop reason: HOSPADM

## 2021-01-01 RX ORDER — ONDANSETRON 4 MG/1
8 TABLET, FILM COATED ORAL 3 TIMES DAILY PRN
Status: DISCONTINUED | OUTPATIENT
Start: 2021-01-01 | End: 2021-01-01 | Stop reason: SDUPTHER

## 2021-01-01 RX ORDER — GABAPENTIN 300 MG/1
300 CAPSULE ORAL EVERY 8 HOURS SCHEDULED
Qty: 15 CAPSULE | Refills: 0 | Status: SHIPPED | OUTPATIENT
Start: 2021-01-01

## 2021-01-01 RX ORDER — IPRATROPIUM BROMIDE AND ALBUTEROL SULFATE 2.5; .5 MG/3ML; MG/3ML
3 SOLUTION RESPIRATORY (INHALATION)
Status: DISCONTINUED | OUTPATIENT
Start: 2021-01-01 | End: 2021-01-01 | Stop reason: HOSPADM

## 2021-01-01 RX ORDER — LIDOCAINE HYDROCHLORIDE 10 MG/ML
INJECTION, SOLUTION EPIDURAL; INFILTRATION; INTRACAUDAL; PERINEURAL AS NEEDED
Status: DISCONTINUED | OUTPATIENT
Start: 2021-01-01 | End: 2021-01-01 | Stop reason: SURG

## 2021-01-01 RX ORDER — HYDROCODONE BITARTRATE AND ACETAMINOPHEN 5; 325 MG/1; MG/1
1 TABLET ORAL EVERY 4 HOURS PRN
Status: DISCONTINUED | OUTPATIENT
Start: 2021-01-01 | End: 2021-01-01 | Stop reason: HOSPADM

## 2021-01-01 RX ORDER — PANTOPRAZOLE SODIUM 40 MG/10ML
40 INJECTION, POWDER, LYOPHILIZED, FOR SOLUTION INTRAVENOUS EVERY 12 HOURS SCHEDULED
Status: DISCONTINUED | OUTPATIENT
Start: 2021-01-01 | End: 2021-01-01

## 2021-01-01 RX ORDER — LIDOCAINE AND PRILOCAINE 25; 25 MG/G; MG/G
CREAM TOPICAL AS NEEDED
Qty: 30 G | Refills: 2 | Status: SHIPPED | OUTPATIENT
Start: 2021-01-01

## 2021-01-01 RX ORDER — AMOXICILLIN 500 MG/1
500 CAPSULE ORAL 2 TIMES DAILY
Qty: 10 CAPSULE | Refills: 0 | Status: CANCELLED | OUTPATIENT
Start: 2021-01-01

## 2021-01-01 RX ORDER — ANASTROZOLE 1 MG/1
1 TABLET ORAL DAILY
Qty: 30 TABLET | Refills: 5 | Status: SHIPPED | OUTPATIENT
Start: 2021-01-01 | End: 2021-01-01 | Stop reason: HOSPADM

## 2021-01-01 RX ORDER — SODIUM CHLORIDE 9 MG/ML
INJECTION, SOLUTION INTRAVENOUS CONTINUOUS PRN
Status: DISCONTINUED | OUTPATIENT
Start: 2021-01-01 | End: 2021-01-01 | Stop reason: SURG

## 2021-01-01 RX ADMIN — GABAPENTIN 300 MG: 300 CAPSULE ORAL at 22:09

## 2021-01-01 RX ADMIN — GABAPENTIN 300 MG: 300 CAPSULE ORAL at 22:29

## 2021-01-01 RX ADMIN — IPRATROPIUM BROMIDE AND ALBUTEROL SULFATE 3 ML: 2.5; .5 SOLUTION RESPIRATORY (INHALATION) at 07:38

## 2021-01-01 RX ADMIN — DIPHENHYDRAMINE HYDROCHLORIDE 25 MG: 25 CAPSULE ORAL at 07:52

## 2021-01-01 RX ADMIN — HEPARIN 500 UNITS: 100 SYRINGE at 11:54

## 2021-01-01 RX ADMIN — MORPHINE SULFATE 1 MG: 2 INJECTION, SOLUTION INTRAMUSCULAR; INTRAVENOUS at 08:37

## 2021-01-01 RX ADMIN — DIPHENHYDRAMINE HYDROCHLORIDE 50 MG: 50 INJECTION INTRAMUSCULAR; INTRAVENOUS at 10:24

## 2021-01-01 RX ADMIN — ALBUTEROL SULFATE 2.5 MG: 2.5 SOLUTION RESPIRATORY (INHALATION) at 12:49

## 2021-01-01 RX ADMIN — NYSTATIN 1 APPLICATION: 100000 POWDER TOPICAL at 20:20

## 2021-01-01 RX ADMIN — DULOXETINE HYDROCHLORIDE 60 MG: 60 CAPSULE, DELAYED RELEASE ORAL at 08:38

## 2021-01-01 RX ADMIN — MAGNESIUM SULFATE HEPTAHYDRATE 2 G: 2 INJECTION, SOLUTION INTRAVENOUS at 08:27

## 2021-01-01 RX ADMIN — SODIUM CHLORIDE 250 ML: 9 INJECTION, SOLUTION INTRAVENOUS at 09:02

## 2021-01-01 RX ADMIN — DULOXETINE HYDROCHLORIDE 60 MG: 60 CAPSULE, DELAYED RELEASE ORAL at 09:18

## 2021-01-01 RX ADMIN — SODIUM CHLORIDE 150 MG: 9 INJECTION, SOLUTION INTRAVENOUS at 10:53

## 2021-01-01 RX ADMIN — NYSTATIN: 100000 POWDER TOPICAL at 09:18

## 2021-01-01 RX ADMIN — SODIUM CHLORIDE, PRESERVATIVE FREE 10 ML: 5 INJECTION INTRAVENOUS at 08:39

## 2021-01-01 RX ADMIN — SODIUM CHLORIDE, PRESERVATIVE FREE 10 ML: 5 INJECTION INTRAVENOUS at 09:09

## 2021-01-01 RX ADMIN — PACLITAXEL 265 MG: 6 INJECTION, SOLUTION INTRAVENOUS at 11:00

## 2021-01-01 RX ADMIN — NICOTINE 1 PATCH: 14 PATCH, EXTENDED RELEASE TRANSDERMAL at 08:37

## 2021-01-01 RX ADMIN — MORPHINE SULFATE 1 MG: 2 INJECTION, SOLUTION INTRAMUSCULAR; INTRAVENOUS at 23:00

## 2021-01-01 RX ADMIN — FERROUS SULFATE TAB 325 MG (65 MG ELEMENTAL FE) 325 MG: 325 (65 FE) TAB at 10:10

## 2021-01-01 RX ADMIN — SODIUM CHLORIDE 150 MG: 9 INJECTION, SOLUTION INTRAVENOUS at 10:40

## 2021-01-01 RX ADMIN — IPRATROPIUM BROMIDE AND ALBUTEROL SULFATE 3 ML: 2.5; .5 SOLUTION RESPIRATORY (INHALATION) at 11:44

## 2021-01-01 RX ADMIN — INSULIN LISPRO 2 UNITS: 100 INJECTION, SOLUTION INTRAVENOUS; SUBCUTANEOUS at 11:42

## 2021-01-01 RX ADMIN — DULOXETINE HYDROCHLORIDE 60 MG: 60 CAPSULE, DELAYED RELEASE ORAL at 10:19

## 2021-01-01 RX ADMIN — GABAPENTIN 300 MG: 300 CAPSULE ORAL at 06:17

## 2021-01-01 RX ADMIN — ATORVASTATIN CALCIUM 20 MG: 20 TABLET, FILM COATED ORAL at 20:45

## 2021-01-01 RX ADMIN — IPRATROPIUM BROMIDE AND ALBUTEROL SULFATE 3 ML: 2.5; .5 SOLUTION RESPIRATORY (INHALATION) at 16:49

## 2021-01-01 RX ADMIN — OXYCODONE 5 MG: 5 TABLET ORAL at 13:30

## 2021-01-01 RX ADMIN — CARBOPLATIN 560 MG: 10 INJECTION, SOLUTION INTRAVENOUS at 14:34

## 2021-01-01 RX ADMIN — SODIUM CHLORIDE, PRESERVATIVE FREE 10 ML: 5 INJECTION INTRAVENOUS at 20:47

## 2021-01-01 RX ADMIN — PANTOPRAZOLE SODIUM 40 MG: 40 TABLET, DELAYED RELEASE ORAL at 13:06

## 2021-01-01 RX ADMIN — SODIUM CHLORIDE, PRESERVATIVE FREE 10 ML: 5 INJECTION INTRAVENOUS at 09:18

## 2021-01-01 RX ADMIN — NYSTATIN: 100000 POWDER TOPICAL at 20:29

## 2021-01-01 RX ADMIN — ONDANSETRON HYDROCHLORIDE 4 MG: 4 TABLET, FILM COATED ORAL at 21:02

## 2021-01-01 RX ADMIN — DULOXETINE HYDROCHLORIDE 20 MG: 20 CAPSULE, DELAYED RELEASE ORAL at 08:26

## 2021-01-01 RX ADMIN — GABAPENTIN 300 MG: 300 CAPSULE ORAL at 05:24

## 2021-01-01 RX ADMIN — ASPIRIN 81 MG: 81 TABLET, COATED ORAL at 13:05

## 2021-01-01 RX ADMIN — DEXAMETHASONE SODIUM PHOSPHATE 20 MG: 4 INJECTION, SOLUTION INTRAMUSCULAR; INTRAVENOUS at 11:10

## 2021-01-01 RX ADMIN — MORPHINE SULFATE 1 MG: 2 INJECTION, SOLUTION INTRAMUSCULAR; INTRAVENOUS at 23:44

## 2021-01-01 RX ADMIN — ACETAMINOPHEN 650 MG: 325 TABLET ORAL at 07:52

## 2021-01-01 RX ADMIN — ALBUTEROL SULFATE 2.5 MG: 2.5 SOLUTION RESPIRATORY (INHALATION) at 07:58

## 2021-01-01 RX ADMIN — LIDOCAINE HYDROCHLORIDE 50 MG: 10 INJECTION, SOLUTION EPIDURAL; INFILTRATION; INTRACAUDAL; PERINEURAL at 08:39

## 2021-01-01 RX ADMIN — OXYBUTYNIN CHLORIDE 5 MG: 5 TABLET, EXTENDED RELEASE ORAL at 17:55

## 2021-01-01 RX ADMIN — ALBUTEROL SULFATE 2.5 MG: 2.5 SOLUTION RESPIRATORY (INHALATION) at 19:41

## 2021-01-01 RX ADMIN — FAMOTIDINE 20 MG: 10 INJECTION, SOLUTION INTRAVENOUS at 10:44

## 2021-01-01 RX ADMIN — POLYETHYLENE GLYCOL 3350 17 G: 17 POWDER, FOR SOLUTION ORAL at 17:26

## 2021-01-01 RX ADMIN — ALBUTEROL SULFATE 2.5 MG: 2.5 SOLUTION RESPIRATORY (INHALATION) at 20:19

## 2021-01-01 RX ADMIN — DULOXETINE HYDROCHLORIDE 60 MG: 60 CAPSULE, DELAYED RELEASE ORAL at 08:37

## 2021-01-01 RX ADMIN — FENTANYL CITRATE 25 MCG: 50 INJECTION, SOLUTION INTRAMUSCULAR; INTRAVENOUS at 16:21

## 2021-01-01 RX ADMIN — POTASSIUM CHLORIDE 40 MEQ: 750 CAPSULE, EXTENDED RELEASE ORAL at 08:27

## 2021-01-01 RX ADMIN — NYSTATIN: 100000 POWDER TOPICAL at 17:24

## 2021-01-01 RX ADMIN — OXYBUTYNIN CHLORIDE 5 MG: 5 TABLET, EXTENDED RELEASE ORAL at 10:19

## 2021-01-01 RX ADMIN — NICOTINE 1 PATCH: 14 PATCH, EXTENDED RELEASE TRANSDERMAL at 08:39

## 2021-01-01 RX ADMIN — NICOTINE 1 PATCH: 14 PATCH, EXTENDED RELEASE TRANSDERMAL at 17:55

## 2021-01-01 RX ADMIN — PALONOSETRON 0.25 MG: 0.25 INJECTION, SOLUTION INTRAVENOUS at 10:20

## 2021-01-01 RX ADMIN — SODIUM CHLORIDE 75 ML/HR: 9 INJECTION, SOLUTION INTRAVENOUS at 16:57

## 2021-01-01 RX ADMIN — ALBUTEROL SULFATE 2.5 MG: 2.5 SOLUTION RESPIRATORY (INHALATION) at 06:46

## 2021-01-01 RX ADMIN — PANTOPRAZOLE SODIUM 40 MG: 40 TABLET, DELAYED RELEASE ORAL at 08:37

## 2021-01-01 RX ADMIN — HEPARIN 500 UNITS: 100 SYRINGE at 16:43

## 2021-01-01 RX ADMIN — GABAPENTIN 300 MG: 300 CAPSULE ORAL at 15:17

## 2021-01-01 RX ADMIN — MORPHINE SULFATE 1 MG: 2 INJECTION, SOLUTION INTRAMUSCULAR; INTRAVENOUS at 17:32

## 2021-01-01 RX ADMIN — PANTOPRAZOLE SODIUM 40 MG: 40 TABLET, DELAYED RELEASE ORAL at 17:26

## 2021-01-01 RX ADMIN — SODIUM CHLORIDE, PRESERVATIVE FREE 10 ML: 5 INJECTION INTRAVENOUS at 21:02

## 2021-01-01 RX ADMIN — POTASSIUM CHLORIDE 40 MEQ: 750 CAPSULE, EXTENDED RELEASE ORAL at 01:06

## 2021-01-01 RX ADMIN — SODIUM CHLORIDE 1000 ML/HR: 9 INJECTION, SOLUTION INTRAVENOUS at 15:34

## 2021-01-01 RX ADMIN — HYDROCODONE BITARTRATE AND ACETAMINOPHEN 1 TABLET: 5; 325 TABLET ORAL at 12:07

## 2021-01-01 RX ADMIN — FENTANYL CITRATE 25 MCG: 50 INJECTION, SOLUTION INTRAMUSCULAR; INTRAVENOUS at 08:11

## 2021-01-01 RX ADMIN — MORPHINE SULFATE 1 MG: 2 INJECTION, SOLUTION INTRAMUSCULAR; INTRAVENOUS at 01:01

## 2021-01-01 RX ADMIN — ATORVASTATIN CALCIUM 20 MG: 20 TABLET, FILM COATED ORAL at 20:30

## 2021-01-01 RX ADMIN — PANTOPRAZOLE SODIUM 40 MG: 40 TABLET, DELAYED RELEASE ORAL at 08:40

## 2021-01-01 RX ADMIN — MORPHINE SULFATE 1 MG: 2 INJECTION, SOLUTION INTRAMUSCULAR; INTRAVENOUS at 13:39

## 2021-01-01 RX ADMIN — MORPHINE SULFATE 1 MG: 2 INJECTION, SOLUTION INTRAMUSCULAR; INTRAVENOUS at 04:39

## 2021-01-01 RX ADMIN — INSULIN LISPRO 2 UNITS: 100 INJECTION, SOLUTION INTRAVENOUS; SUBCUTANEOUS at 11:44

## 2021-01-01 RX ADMIN — PALONOSETRON 0.25 MG: 0.25 INJECTION, SOLUTION INTRAVENOUS at 10:57

## 2021-01-01 RX ADMIN — ALBUTEROL SULFATE 2.5 MG: 2.5 SOLUTION RESPIRATORY (INHALATION) at 07:07

## 2021-01-01 RX ADMIN — DEXAMETHASONE SODIUM PHOSPHATE 20 MG: 4 INJECTION, SOLUTION INTRAMUSCULAR; INTRAVENOUS at 10:24

## 2021-01-01 RX ADMIN — SODIUM CHLORIDE, PRESERVATIVE FREE 10 ML: 5 INJECTION INTRAVENOUS at 20:29

## 2021-01-01 RX ADMIN — DULOXETINE HYDROCHLORIDE 60 MG: 60 CAPSULE, DELAYED RELEASE ORAL at 13:06

## 2021-01-01 RX ADMIN — ALBUTEROL SULFATE 2.5 MG: 2.5 SOLUTION RESPIRATORY (INHALATION) at 18:36

## 2021-01-01 RX ADMIN — ASPIRIN 81 MG: 81 TABLET, COATED ORAL at 08:37

## 2021-01-01 RX ADMIN — DEXAMETHASONE SODIUM PHOSPHATE 20 MG: 4 INJECTION, SOLUTION INTRAMUSCULAR; INTRAVENOUS at 10:23

## 2021-01-01 RX ADMIN — POTASSIUM CHLORIDE 40 MEQ: 750 CAPSULE, EXTENDED RELEASE ORAL at 20:47

## 2021-01-01 RX ADMIN — INSULIN LISPRO 7 UNITS: 100 INJECTION, SOLUTION INTRAVENOUS; SUBCUTANEOUS at 20:30

## 2021-01-01 RX ADMIN — DOCUSATE SODIUM 50MG AND SENNOSIDES 8.6MG 2 TABLET: 8.6; 5 TABLET, FILM COATED ORAL at 13:06

## 2021-01-01 RX ADMIN — ALBUTEROL SULFATE 2.5 MG: 2.5 SOLUTION RESPIRATORY (INHALATION) at 12:13

## 2021-01-01 RX ADMIN — SODIUM CHLORIDE 75 ML/HR: 9 INJECTION, SOLUTION INTRAVENOUS at 05:19

## 2021-01-01 RX ADMIN — ATORVASTATIN CALCIUM 20 MG: 20 TABLET, FILM COATED ORAL at 21:01

## 2021-01-01 RX ADMIN — ANTACID TABLETS 2 TABLET: 500 TABLET, CHEWABLE ORAL at 13:07

## 2021-01-01 RX ADMIN — NYSTATIN: 100000 POWDER TOPICAL at 16:44

## 2021-01-01 RX ADMIN — DULOXETINE HYDROCHLORIDE 60 MG: 60 CAPSULE, DELAYED RELEASE ORAL at 17:55

## 2021-01-01 RX ADMIN — FAMOTIDINE 20 MG: 10 INJECTION, SOLUTION INTRAVENOUS at 10:21

## 2021-01-01 RX ADMIN — Medication 80 MCG: at 08:46

## 2021-01-01 RX ADMIN — SODIUM CHLORIDE, PRESERVATIVE FREE 10 ML: 5 INJECTION INTRAVENOUS at 08:22

## 2021-01-01 RX ADMIN — PROMETHAZINE HYDROCHLORIDE 6.25 MG: 12.5 TABLET ORAL at 12:07

## 2021-01-01 RX ADMIN — NYSTATIN: 100000 POWDER TOPICAL at 21:02

## 2021-01-01 RX ADMIN — PANTOPRAZOLE SODIUM 40 MG: 40 TABLET, DELAYED RELEASE ORAL at 08:38

## 2021-01-01 RX ADMIN — DIPHENHYDRAMINE HYDROCHLORIDE 25 MG: 25 CAPSULE ORAL at 08:58

## 2021-01-01 RX ADMIN — ALBUTEROL SULFATE 2 PUFF: 108 AEROSOL, METERED RESPIRATORY (INHALATION) at 20:21

## 2021-01-01 RX ADMIN — AMLODIPINE BESYLATE 5 MG: 5 TABLET ORAL at 08:38

## 2021-01-01 RX ADMIN — AMLODIPINE BESYLATE 5 MG: 5 TABLET ORAL at 10:19

## 2021-01-01 RX ADMIN — ALBUTEROL SULFATE 2.5 MG: 2.5 SOLUTION RESPIRATORY (INHALATION) at 00:54

## 2021-01-01 RX ADMIN — DEXAMETHASONE SODIUM PHOSPHATE 20 MG: 4 INJECTION, SOLUTION INTRAMUSCULAR; INTRAVENOUS at 11:16

## 2021-01-01 RX ADMIN — ATORVASTATIN CALCIUM 20 MG: 20 TABLET, FILM COATED ORAL at 20:26

## 2021-01-01 RX ADMIN — MORPHINE SULFATE 1 MG: 2 INJECTION, SOLUTION INTRAMUSCULAR; INTRAVENOUS at 04:26

## 2021-01-01 RX ADMIN — GABAPENTIN 300 MG: 300 CAPSULE ORAL at 16:56

## 2021-01-01 RX ADMIN — ACETAMINOPHEN 650 MG: 325 TABLET ORAL at 08:38

## 2021-01-01 RX ADMIN — ASPIRIN 81 MG: 81 TABLET, COATED ORAL at 08:38

## 2021-01-01 RX ADMIN — SODIUM CHLORIDE, PRESERVATIVE FREE 10 ML: 5 INJECTION INTRAVENOUS at 20:26

## 2021-01-01 RX ADMIN — MORPHINE SULFATE 1 MG: 2 INJECTION, SOLUTION INTRAMUSCULAR; INTRAVENOUS at 22:58

## 2021-01-01 RX ADMIN — DOCUSATE SODIUM 50MG AND SENNOSIDES 8.6MG 2 TABLET: 8.6; 5 TABLET, FILM COATED ORAL at 15:35

## 2021-01-01 RX ADMIN — NYSTATIN: 100000 POWDER TOPICAL at 17:14

## 2021-01-01 RX ADMIN — INSULIN LISPRO 2 UNITS: 100 INJECTION, SOLUTION INTRAVENOUS; SUBCUTANEOUS at 18:03

## 2021-01-01 RX ADMIN — ALBUTEROL SULFATE 2.5 MG: 2.5 SOLUTION RESPIRATORY (INHALATION) at 19:49

## 2021-01-01 RX ADMIN — SODIUM CHLORIDE 30 ML/HR: 9 INJECTION, SOLUTION INTRAVENOUS at 08:05

## 2021-01-01 RX ADMIN — OXYBUTYNIN CHLORIDE 5 MG: 5 TABLET, EXTENDED RELEASE ORAL at 10:10

## 2021-01-01 RX ADMIN — OXYCODONE 5 MG: 5 TABLET ORAL at 06:31

## 2021-01-01 RX ADMIN — PROMETHAZINE HYDROCHLORIDE 6.25 MG: 12.5 TABLET ORAL at 10:23

## 2021-01-01 RX ADMIN — DULOXETINE HYDROCHLORIDE 20 MG: 20 CAPSULE, DELAYED RELEASE ORAL at 10:10

## 2021-01-01 RX ADMIN — MAGNESIUM SULFATE HEPTAHYDRATE 2 G: 2 INJECTION, SOLUTION INTRAVENOUS at 13:44

## 2021-01-01 RX ADMIN — INSULIN LISPRO 3 UNITS: 100 INJECTION, SOLUTION INTRAVENOUS; SUBCUTANEOUS at 17:13

## 2021-01-01 RX ADMIN — HEPARIN 500 UNITS: 100 SYRINGE at 13:40

## 2021-01-01 RX ADMIN — SODIUM CHLORIDE, PRESERVATIVE FREE 10 ML: 5 INJECTION INTRAVENOUS at 01:23

## 2021-01-01 RX ADMIN — IOPAMIDOL 95 ML: 612 INJECTION, SOLUTION INTRAVENOUS at 11:46

## 2021-01-01 RX ADMIN — MORPHINE SULFATE 1 MG: 2 INJECTION, SOLUTION INTRAMUSCULAR; INTRAVENOUS at 08:39

## 2021-01-01 RX ADMIN — OXYBUTYNIN CHLORIDE 5 MG: 5 TABLET, EXTENDED RELEASE ORAL at 08:38

## 2021-01-01 RX ADMIN — PANTOPRAZOLE SODIUM 40 MG: 40 TABLET, DELAYED RELEASE ORAL at 17:12

## 2021-01-01 RX ADMIN — HYDROCODONE BITARTRATE AND ACETAMINOPHEN 1 TABLET: 5; 325 TABLET ORAL at 10:23

## 2021-01-01 RX ADMIN — HEPARIN 500 UNITS: 100 SYRINGE at 10:05

## 2021-01-01 RX ADMIN — SODIUM CHLORIDE 150 MG: 9 INJECTION, SOLUTION INTRAVENOUS at 10:23

## 2021-01-01 RX ADMIN — MAGNESIUM SULFATE HEPTAHYDRATE 2 G: 2 INJECTION, SOLUTION INTRAVENOUS at 10:53

## 2021-01-01 RX ADMIN — LORAZEPAM 1 MG: 2 INJECTION INTRAMUSCULAR; INTRAVENOUS at 21:53

## 2021-01-01 RX ADMIN — NYSTATIN: 100000 POWDER TOPICAL at 15:18

## 2021-01-01 RX ADMIN — ASPIRIN 81 MG: 81 TABLET, COATED ORAL at 09:18

## 2021-01-01 RX ADMIN — PANTOPRAZOLE SODIUM 40 MG: 40 TABLET, DELAYED RELEASE ORAL at 10:18

## 2021-01-01 RX ADMIN — INSULIN LISPRO 3 UNITS: 100 INJECTION, SOLUTION INTRAVENOUS; SUBCUTANEOUS at 12:15

## 2021-01-01 RX ADMIN — GABAPENTIN 300 MG: 300 CAPSULE ORAL at 21:06

## 2021-01-01 RX ADMIN — SODIUM CHLORIDE, PRESERVATIVE FREE 10 ML: 5 INJECTION INTRAVENOUS at 21:00

## 2021-01-01 RX ADMIN — DOCUSATE SODIUM 50MG AND SENNOSIDES 8.6MG 2 TABLET: 8.6; 5 TABLET, FILM COATED ORAL at 08:37

## 2021-01-01 RX ADMIN — DIPHENHYDRAMINE HYDROCHLORIDE 50 MG: 50 INJECTION INTRAMUSCULAR; INTRAVENOUS at 10:44

## 2021-01-01 RX ADMIN — LORAZEPAM 1 MG: 2 INJECTION INTRAMUSCULAR; INTRAVENOUS at 02:45

## 2021-01-01 RX ADMIN — ALBUTEROL SULFATE 2.5 MG: 2.5 SOLUTION RESPIRATORY (INHALATION) at 00:52

## 2021-01-01 RX ADMIN — HYDROCODONE BITARTRATE AND ACETAMINOPHEN 1 TABLET: 5; 325 TABLET ORAL at 21:02

## 2021-01-01 RX ADMIN — SODIUM CHLORIDE 150 MG: 9 INJECTION, SOLUTION INTRAVENOUS at 11:30

## 2021-01-01 RX ADMIN — NYSTATIN: 100000 POWDER TOPICAL at 20:26

## 2021-01-01 RX ADMIN — GABAPENTIN 300 MG: 300 CAPSULE ORAL at 21:03

## 2021-01-01 RX ADMIN — ACETAMINOPHEN 650 MG: 325 TABLET ORAL at 08:58

## 2021-01-01 RX ADMIN — GABAPENTIN 300 MG: 300 CAPSULE ORAL at 13:05

## 2021-01-01 RX ADMIN — PALONOSETRON HYDROCHLORIDE 0.25 MG: 0.05 INJECTION, SOLUTION INTRAVENOUS at 10:40

## 2021-01-01 RX ADMIN — MORPHINE SULFATE 1 MG: 2 INJECTION, SOLUTION INTRAMUSCULAR; INTRAVENOUS at 08:21

## 2021-01-01 RX ADMIN — LORAZEPAM 1 MG: 2 INJECTION INTRAMUSCULAR; INTRAVENOUS at 20:33

## 2021-01-01 RX ADMIN — FERROUS SULFATE TAB 325 MG (65 MG ELEMENTAL FE) 325 MG: 325 (65 FE) TAB at 08:26

## 2021-01-01 RX ADMIN — DIPHENHYDRAMINE HYDROCHLORIDE 50 MG: 50 INJECTION INTRAMUSCULAR; INTRAVENOUS at 11:17

## 2021-01-01 RX ADMIN — INSULIN LISPRO 2 UNITS: 100 INJECTION, SOLUTION INTRAVENOUS; SUBCUTANEOUS at 17:24

## 2021-01-01 RX ADMIN — ATORVASTATIN CALCIUM 20 MG: 20 TABLET, FILM COATED ORAL at 20:20

## 2021-01-01 RX ADMIN — OXYBUTYNIN CHLORIDE 5 MG: 5 TABLET, EXTENDED RELEASE ORAL at 08:37

## 2021-01-01 RX ADMIN — DOCUSATE SODIUM 50MG AND SENNOSIDES 8.6MG 2 TABLET: 8.6; 5 TABLET, FILM COATED ORAL at 20:29

## 2021-01-01 RX ADMIN — HEPARIN 500 UNITS: 100 SYRINGE at 15:08

## 2021-01-01 RX ADMIN — ACETAMINOPHEN 325 MG: 325 TABLET ORAL at 12:43

## 2021-01-01 RX ADMIN — CARBOPLATIN 450 MG: 10 INJECTION, SOLUTION INTRAVENOUS at 14:35

## 2021-01-01 RX ADMIN — INSULIN LISPRO 3 UNITS: 100 INJECTION, SOLUTION INTRAVENOUS; SUBCUTANEOUS at 17:26

## 2021-01-01 RX ADMIN — PANTOPRAZOLE SODIUM 40 MG: 40 TABLET, DELAYED RELEASE ORAL at 17:27

## 2021-01-01 RX ADMIN — FAMOTIDINE 20 MG: 10 INJECTION, SOLUTION INTRAVENOUS at 11:07

## 2021-01-01 RX ADMIN — ALBUTEROL SULFATE 2.5 MG: 2.5 SOLUTION RESPIRATORY (INHALATION) at 08:00

## 2021-01-01 RX ADMIN — NICOTINE 1 PATCH: 14 PATCH, EXTENDED RELEASE TRANSDERMAL at 09:18

## 2021-01-01 RX ADMIN — GABAPENTIN 300 MG: 300 CAPSULE ORAL at 20:45

## 2021-01-01 RX ADMIN — SODIUM CHLORIDE 250 ML: 9 INJECTION, SOLUTION INTRAVENOUS at 08:00

## 2021-01-01 RX ADMIN — SODIUM CHLORIDE 250 ML: 9 INJECTION, SOLUTION INTRAVENOUS at 11:04

## 2021-01-01 RX ADMIN — OXYBUTYNIN CHLORIDE 5 MG: 5 TABLET, EXTENDED RELEASE ORAL at 13:06

## 2021-01-01 RX ADMIN — ALBUTEROL SULFATE 2.5 MG: 2.5 SOLUTION RESPIRATORY (INHALATION) at 13:12

## 2021-01-01 RX ADMIN — HYDROCODONE BITARTRATE AND ACETAMINOPHEN 1 TABLET: 5; 325 TABLET ORAL at 05:18

## 2021-01-01 RX ADMIN — GABAPENTIN 300 MG: 300 CAPSULE ORAL at 05:36

## 2021-01-01 RX ADMIN — IPRATROPIUM BROMIDE AND ALBUTEROL SULFATE 3 ML: 2.5; .5 SOLUTION RESPIRATORY (INHALATION) at 20:09

## 2021-01-01 RX ADMIN — ALBUTEROL SULFATE 2.5 MG: 2.5 SOLUTION RESPIRATORY (INHALATION) at 00:29

## 2021-01-01 RX ADMIN — PROPOFOL 10 MG: 10 INJECTION, EMULSION INTRAVENOUS at 08:43

## 2021-01-01 RX ADMIN — ANTACID TABLETS 2 TABLET: 500 TABLET, CHEWABLE ORAL at 22:08

## 2021-01-01 RX ADMIN — MORPHINE SULFATE 1 MG: 2 INJECTION, SOLUTION INTRAMUSCULAR; INTRAVENOUS at 13:07

## 2021-01-01 RX ADMIN — GABAPENTIN 300 MG: 300 CAPSULE ORAL at 06:31

## 2021-01-01 RX ADMIN — FAMOTIDINE 20 MG: 10 INJECTION, SOLUTION INTRAVENOUS at 10:24

## 2021-01-01 RX ADMIN — SODIUM CHLORIDE 250 ML: 9 INJECTION, SOLUTION INTRAVENOUS at 10:19

## 2021-01-01 RX ADMIN — ONDANSETRON HYDROCHLORIDE 4 MG: 4 TABLET, FILM COATED ORAL at 02:39

## 2021-01-01 RX ADMIN — PALONOSETRON HYDROCHLORIDE 0.25 MG: 0.05 INJECTION, SOLUTION INTRAVENOUS at 11:04

## 2021-01-01 RX ADMIN — MORPHINE SULFATE 1 MG: 2 INJECTION, SOLUTION INTRAMUSCULAR; INTRAVENOUS at 12:15

## 2021-01-01 RX ADMIN — ALBUTEROL SULFATE 2.5 MG: 2.5 SOLUTION RESPIRATORY (INHALATION) at 07:32

## 2021-01-01 RX ADMIN — GABAPENTIN 300 MG: 300 CAPSULE ORAL at 08:27

## 2021-01-01 RX ADMIN — GABAPENTIN 300 MG: 300 CAPSULE ORAL at 05:59

## 2021-01-01 RX ADMIN — HEPARIN 500 UNITS: 100 SYRINGE at 17:12

## 2021-01-01 RX ADMIN — ASPIRIN 81 MG: 81 TABLET, COATED ORAL at 10:19

## 2021-01-01 RX ADMIN — NYSTATIN: 100000 POWDER TOPICAL at 10:08

## 2021-01-01 RX ADMIN — CARBOPLATIN 420 MG: 10 INJECTION, SOLUTION INTRAVENOUS at 14:36

## 2021-01-01 RX ADMIN — HEPARIN 500 UNITS: 100 SYRINGE at 15:58

## 2021-01-01 RX ADMIN — PANTOPRAZOLE SODIUM 40 MG: 40 TABLET, DELAYED RELEASE ORAL at 17:32

## 2021-01-01 RX ADMIN — PACLITAXEL 265 MG: 6 INJECTION, SOLUTION INTRAVENOUS at 12:15

## 2021-01-01 RX ADMIN — OXYBUTYNIN CHLORIDE 5 MG: 5 TABLET, EXTENDED RELEASE ORAL at 08:26

## 2021-01-01 RX ADMIN — SODIUM CHLORIDE: 9 INJECTION, SOLUTION INTRAVENOUS at 08:30

## 2021-01-01 RX ADMIN — GABAPENTIN 300 MG: 300 CAPSULE ORAL at 14:30

## 2021-01-01 RX ADMIN — AMLODIPINE BESYLATE 5 MG: 5 TABLET ORAL at 13:06

## 2021-01-01 RX ADMIN — IOPAMIDOL 64 ML: 755 INJECTION, SOLUTION INTRAVENOUS at 20:15

## 2021-01-01 RX ADMIN — PANTOPRAZOLE SODIUM 40 MG: 40 INJECTION, POWDER, FOR SOLUTION INTRAVENOUS at 23:37

## 2021-01-01 RX ADMIN — ACETAMINOPHEN 650 MG: 325 TABLET ORAL at 08:27

## 2021-01-01 RX ADMIN — OXYCODONE 5 MG: 5 TABLET ORAL at 00:52

## 2021-01-01 RX ADMIN — OXYCODONE 5 MG: 5 TABLET ORAL at 21:06

## 2021-01-01 RX ADMIN — GABAPENTIN 300 MG: 300 CAPSULE ORAL at 13:39

## 2021-01-01 RX ADMIN — HEPARIN 500 UNITS: 100 SYRINGE at 15:57

## 2021-01-01 RX ADMIN — PANTOPRAZOLE SODIUM 40 MG: 40 TABLET, DELAYED RELEASE ORAL at 10:09

## 2021-01-01 RX ADMIN — ANTACID TABLETS 2 TABLET: 500 TABLET, CHEWABLE ORAL at 10:23

## 2021-01-01 RX ADMIN — DOCUSATE SODIUM 50MG AND SENNOSIDES 8.6MG 2 TABLET: 8.6; 5 TABLET, FILM COATED ORAL at 10:18

## 2021-01-01 RX ADMIN — ALBUTEROL SULFATE 2.5 MG: 2.5 SOLUTION RESPIRATORY (INHALATION) at 19:45

## 2021-01-01 RX ADMIN — PANTOPRAZOLE SODIUM 40 MG: 40 INJECTION, POWDER, FOR SOLUTION INTRAVENOUS at 08:26

## 2021-01-01 RX ADMIN — SODIUM CHLORIDE 250 ML: 9 INJECTION, SOLUTION INTRAVENOUS at 10:22

## 2021-01-01 RX ADMIN — ALBUTEROL SULFATE 2.5 MG: 2.5 SOLUTION RESPIRATORY (INHALATION) at 12:23

## 2021-01-01 RX ADMIN — NICOTINE 1 PATCH: 14 PATCH, EXTENDED RELEASE TRANSDERMAL at 10:19

## 2021-01-01 RX ADMIN — NYSTATIN: 100000 POWDER TOPICAL at 20:47

## 2021-01-01 RX ADMIN — PROPOFOL 30 MG: 10 INJECTION, EMULSION INTRAVENOUS at 08:39

## 2021-01-01 RX ADMIN — DOCUSATE SODIUM 50MG AND SENNOSIDES 8.6MG 2 TABLET: 8.6; 5 TABLET, FILM COATED ORAL at 20:20

## 2021-01-01 RX ADMIN — PANTOPRAZOLE SODIUM 40 MG: 40 TABLET, DELAYED RELEASE ORAL at 09:18

## 2021-01-01 RX ADMIN — ACETAMINOPHEN 650 MG: 325 TABLET ORAL at 02:39

## 2021-01-01 RX ADMIN — SODIUM CHLORIDE 75 ML/HR: 9 INJECTION, SOLUTION INTRAVENOUS at 23:37

## 2021-01-01 RX ADMIN — ATORVASTATIN CALCIUM 20 MG: 20 TABLET, FILM COATED ORAL at 01:55

## 2021-01-01 RX ADMIN — SODIUM CHLORIDE 265 MG: 9 INJECTION, SOLUTION INTRAVENOUS at 11:36

## 2021-01-01 RX ADMIN — NICOTINE 1 PATCH: 14 PATCH, EXTENDED RELEASE TRANSDERMAL at 08:22

## 2021-01-01 RX ADMIN — DIPHENHYDRAMINE HYDROCHLORIDE 25 MG: 25 CAPSULE ORAL at 12:43

## 2021-01-01 RX ADMIN — LORAZEPAM 1 MG: 2 INJECTION INTRAMUSCULAR; INTRAVENOUS at 15:56

## 2021-01-01 RX ADMIN — MORPHINE SULFATE 1 MG: 2 INJECTION, SOLUTION INTRAMUSCULAR; INTRAVENOUS at 02:26

## 2021-01-01 RX ADMIN — ALBUTEROL SULFATE 2.5 MG: 2.5 SOLUTION RESPIRATORY (INHALATION) at 00:36

## 2021-01-01 RX ADMIN — ASPIRIN 81 MG: 81 TABLET, COATED ORAL at 17:55

## 2021-01-01 RX ADMIN — POTASSIUM CHLORIDE 40 MEQ: 750 CAPSULE, EXTENDED RELEASE ORAL at 11:45

## 2021-01-01 RX ADMIN — DEXAMETHASONE SODIUM PHOSPHATE 20 MG: 4 INJECTION, SOLUTION INTRAMUSCULAR; INTRAVENOUS at 10:43

## 2021-01-01 RX ADMIN — GABAPENTIN 300 MG: 300 CAPSULE ORAL at 21:36

## 2021-01-01 RX ADMIN — SODIUM CHLORIDE 150 MG: 9 INJECTION, SOLUTION INTRAVENOUS at 11:03

## 2021-01-01 RX ADMIN — SODIUM CHLORIDE 250 ML: 9 INJECTION, SOLUTION INTRAVENOUS at 10:40

## 2021-01-01 RX ADMIN — BISACODYL 10 MG: 10 SUPPOSITORY RECTAL at 17:27

## 2021-01-01 RX ADMIN — PANTOPRAZOLE SODIUM 40 MG: 40 TABLET, DELAYED RELEASE ORAL at 17:55

## 2021-01-01 RX ADMIN — HEPARIN 500 UNITS: 100 SYRINGE at 15:15

## 2021-01-01 RX ADMIN — GABAPENTIN 300 MG: 300 CAPSULE ORAL at 21:02

## 2021-01-01 RX ADMIN — SODIUM CHLORIDE 250 ML: 9 INJECTION, SOLUTION INTRAVENOUS at 11:14

## 2021-01-01 RX ADMIN — PROMETHAZINE HYDROCHLORIDE 6.25 MG: 12.5 TABLET ORAL at 21:03

## 2021-01-01 RX ADMIN — GABAPENTIN 300 MG: 300 CAPSULE ORAL at 10:09

## 2021-01-01 RX ADMIN — ACETAMINOPHEN 650 MG: 325 TABLET ORAL at 07:55

## 2021-01-01 RX ADMIN — FAMOTIDINE 20 MG: 10 INJECTION INTRAVENOUS at 11:16

## 2021-01-01 RX ADMIN — PALONOSETRON 0.25 MG: 0.25 INJECTION, SOLUTION INTRAVENOUS at 10:19

## 2021-01-01 RX ADMIN — NYSTATIN: 100000 POWDER TOPICAL at 08:39

## 2021-01-01 RX ADMIN — DIPHENHYDRAMINE HYDROCHLORIDE 50 MG: 50 INJECTION INTRAMUSCULAR; INTRAVENOUS at 10:23

## 2021-01-01 RX ADMIN — SODIUM CHLORIDE, PRESERVATIVE FREE 10 ML: 5 INJECTION INTRAVENOUS at 20:19

## 2021-01-01 RX ADMIN — NYSTATIN: 100000 POWDER TOPICAL at 08:37

## 2021-01-01 RX ADMIN — OXYBUTYNIN CHLORIDE 5 MG: 5 TABLET, EXTENDED RELEASE ORAL at 09:18

## 2021-01-01 RX ADMIN — MORPHINE SULFATE 1 MG: 2 INJECTION, SOLUTION INTRAMUSCULAR; INTRAVENOUS at 09:18

## 2021-01-01 RX ADMIN — NYSTATIN: 100000 POWDER TOPICAL at 20:31

## 2021-01-01 RX ADMIN — NYSTATIN: 100000 POWDER TOPICAL at 10:19

## 2021-01-01 RX ADMIN — PANTOPRAZOLE SODIUM 40 MG: 40 TABLET, DELAYED RELEASE ORAL at 17:24

## 2021-01-01 RX ADMIN — SODIUM CHLORIDE 265 MG: 9 INJECTION, SOLUTION INTRAVENOUS at 11:35

## 2021-01-01 RX ADMIN — HEPARIN 500 UNITS: 100 SYRINGE at 15:10

## 2021-01-01 RX ADMIN — MORPHINE SULFATE 1 MG: 2 INJECTION, SOLUTION INTRAMUSCULAR; INTRAVENOUS at 18:38

## 2021-01-01 RX ADMIN — MORPHINE SULFATE 1 MG: 2 INJECTION, SOLUTION INTRAMUSCULAR; INTRAVENOUS at 21:02

## 2021-01-01 RX ADMIN — NYSTATIN: 100000 POWDER TOPICAL at 14:30

## 2021-01-01 RX ADMIN — SODIUM CHLORIDE 290 MG: 9 INJECTION, SOLUTION INTRAVENOUS at 11:03

## 2021-01-01 RX ADMIN — DIPHENHYDRAMINE HYDROCHLORIDE 50 MG: 50 INJECTION INTRAMUSCULAR; INTRAVENOUS at 11:10

## 2021-01-01 RX ADMIN — ALBUTEROL SULFATE 2.5 MG: 2.5 SOLUTION RESPIRATORY (INHALATION) at 13:37

## 2021-01-01 RX ADMIN — ATORVASTATIN CALCIUM 20 MG: 20 TABLET, FILM COATED ORAL at 21:03

## 2021-01-01 RX ADMIN — CARBOPLATIN 450 MG: 10 INJECTION, SOLUTION INTRAVENOUS at 14:10

## 2021-01-01 RX ADMIN — MORPHINE SULFATE 1 MG: 2 INJECTION, SOLUTION INTRAMUSCULAR; INTRAVENOUS at 22:32

## 2021-01-01 RX ADMIN — GABAPENTIN 300 MG: 300 CAPSULE ORAL at 13:30

## 2021-01-01 RX ADMIN — GABAPENTIN 300 MG: 300 CAPSULE ORAL at 11:42

## 2021-01-01 RX ADMIN — CARBOPLATIN 450 MG: 10 INJECTION, SOLUTION INTRAVENOUS at 15:24

## 2021-01-01 RX ADMIN — ATORVASTATIN CALCIUM 20 MG: 20 TABLET, FILM COATED ORAL at 20:29

## 2021-01-01 RX ADMIN — ALBUTEROL SULFATE 2.5 MG: 2.5 SOLUTION RESPIRATORY (INHALATION) at 01:19

## 2021-01-01 RX ADMIN — PROMETHAZINE HYDROCHLORIDE 6.25 MG: 12.5 TABLET ORAL at 05:18

## 2021-01-01 RX ADMIN — SODIUM CHLORIDE, PRESERVATIVE FREE 10 ML: 5 INJECTION INTRAVENOUS at 21:04

## 2021-01-01 RX ADMIN — AMLODIPINE BESYLATE 5 MG: 5 TABLET ORAL at 08:41

## 2021-01-01 RX ADMIN — FUROSEMIDE 20 MG: 10 INJECTION, SOLUTION INTRAMUSCULAR; INTRAVENOUS at 02:54

## 2021-01-01 RX ADMIN — PANTOPRAZOLE SODIUM 40 MG: 40 INJECTION, POWDER, FOR SOLUTION INTRAVENOUS at 21:02

## 2021-01-04 PROBLEM — E86.0 DEHYDRATION: Status: ACTIVE | Noted: 2021-01-01

## 2021-01-04 NOTE — PROGRESS NOTES
Upon assessment, patient c/o cough and sore throat. Patient appears to be weak and pale. Reported these symptoms to GYN/ONC office spoke to Jessenia. Jessenia stated pt will be added to md schedule for assessment. Pt transported to GYN/ONC apt. Pt denied cough and sore throat at the screening table. /58  TEMP 97.06

## 2021-01-04 NOTE — TELEPHONE ENCOUNTER
I spoke with ERIKA.    Patient has always had the symptoms she is describing and these are not new.    I have asked the patient if she has been in contact with anyone who has had covid or suspected covid. She states she has not.        She is coming as fast as as she can to get fluids and labs.

## 2021-01-12 PROBLEM — Z45.2 ENCOUNTER FOR CARE RELATED TO VASCULAR ACCESS PORT: Status: ACTIVE | Noted: 2021-01-01

## 2021-01-14 NOTE — TELEPHONE ENCOUNTER
PT: SELF    PT CALLING TO LEAVE MESSAGE FOR GABI THAT SHE NEEDS TO GO OVER SOME PAPER WORK HER IN PERSON FMLA AND OTHER ISSUES.  SHE ASK THAT YOU PLEASE CALL HER    PT #: [phone]

## 2021-01-14 NOTE — TELEPHONE ENCOUNTER
I called patient and she needed her [facility] update to say 5 days a week up to 40 hours. Instead of 5 days a month.    Needs case number and name.    She also needs a note for an excuse for her surgery on Monday.    She also needs her recent blood work faxed to her cardiologist.     I told her I would take care of these things.

## 2021-01-15 NOTE — PROGRESS NOTES
TELEMEDICINE FOLLOW-UP NOTE    PATIENT:                                                      Ele Strickland  MEDICAL RECORD #:                        [id number]  :                                                          1950  COMPLETION DATE:   3/11/2020  DIAGNOSIS:     Recurrent and metastatic endometroid adenocarcinoma  - Clinical: FIGO Stage I (cT1, cN0, cM0)  - Pathologic: FIGO Stage II (pT2, pN0, cM0)      Time Devoted to Visit: 21 min including time to review her previous imaging and records, with 75% devoted to direct discussion.    Reason for Visit:  I personally contacted Ele Strickland today in an effort to screen for coronavirus symptoms and also to perform her scheduled follow-up visit remotely per patient request/consent in light of the coronavirus pandemic and recent induction of chemotherapy.  With respect to her specific risks for coronavirus, her screen is as follows:    COVID-19 RISK SCREEN     1. Has the patient had close contact without PPE with a lab confirmed COVID-19 (+) person or a person under investigation (PUI) for COVID-19 infection?  -- No     2. Has the patient had respiratory symptoms, worsened/new cough and/or SOA, unexplained fever, or sudden loss of smell and/or taste in the past 7 days? --  No    3. Does the patient have baseline higher exposure risk such as working in healthcare field or currently residing in healthcare facility?  --  No         BRIEF HISTORY:   Ele Strickland is a 70 y.o. female initially diagnosed with stage II endometrial cancer status post hysterectomy, staging, and adjuvant external beam radiation plus vaginal brachytherapy, completing all treatments in early .  She had been doing well and was without evidence of recurrent disease for some time, but unfortunately she developed a new cough, hemoptysis, and weight loss which prompted work-up.  CT chest 10/30/2020 demonstrated numerous bilateral pulmonary nodules and bulky hilar adenopathy.  CT  abdomen/pelvis was essentially negative for metastatic disease.  She underwent CT-guided biopsy of a pleural-based lung lesion, which confirmed recurrent endometrial cancer.  She ultimately decided to undergo treatment with systemic chemotherapy of carbo/paclitaxel which she began almost 1 month ago.  She feels like she is tolerating this well.  She reports chronic dyspnea but slightly improved as of late as well as resolution of hemoptysis x1 week.  She has a history of achalasia treated surgically in 2012 but no progressive swallowing complaints.  She is fatigued and is taking a break from her work at the [employer].  She recently completed a course of Augmentin though notes persistent vaginal itching with no other/new gynecologic or genitourinary complaints.  She has some nausea without vomiting.  She takes prn dulcolax for occasional constipation and no other change in bowels.  Appetite is fair and improved on steroids.  She recently had an epidural injection for chronic left hip/leg pain related to spinal stenosis.  She had port-a-cath placed earlier this week and she is anxious to proceed forward with chemotherapy.   She denies chest pain, edema, fever, or chills.      MEDICATIONS: Medication reconciliation for the patient was reviewed and confirmed in the electronic medical record.    Review of Systems   Constitutional: Positive for fatigue.   Respiratory: Positive for cough and shortness of breath. Negative for hemoptysis (resolved x1 week).    Gastrointestinal: Positive for constipation and nausea.   Genitourinary: Positive for bladder incontinence.         Vaginal itching   Musculoskeletal: Positive for arthralgias and back pain.   Neurological: Positive for numbness (LLE).   Psychiatric/Behavioral: Positive for depression. The patient is nervous/anxious.    All other systems reviewed and are negative.        KPS 80%    Physical Exam  Pulmonary:      Respirations even, unlabored. No audible wheezing or  cough.  Neurological:      A+Ox4, conversant, answers questions appropriately.  Psychiatric:     Judgement, affect, and decision-making WNL.    Limited physical exam as visit was conducted remotely via telephone in light of the COVID-19 pandemic.      The following portions of the patient's history were reviewed and updated as appropriate: allergies, current medications, past family history, past medical history, past social history, past surgical history and problem list.         Diagnoses and all orders for this visit:    1. Endometrial cancer (CMS/HCC) (Primary)    2. Malignant neoplasm metastatic to lung, unspecified laterality (CMS/HCC)    3. Vulvovaginal candidiasis  -     fluconazole (Diflucan) 150 MG tablet; Take 1 tablet by mouth 1 (One) Time for 1 dose.  Dispense: 1 tablet; Refill: 0         IMPRESSION:  Ele Strickland is a 70 y.o. female with a known diagnosis of endometrial cancer, status post hysterectomy and adjuvant pelvic irradiation and vaginal brachytherapy which she completed in March 2020.   She now has biopsy-proven recurrent and metastatic endometrial cancer with bilateral symptomatic lung metastases and mediastinal adenopathy.  She has begun systemic treatment per Dr. Evans and states her presenting respiratory symptoms have actually improved some, whether that is related to early treatment response or course of antibiotics.  Hopefully she will demonstrate continued response but we did discuss the role of palliative radiotherapy to the chest should she develop/continue with symptoms such as hemoptysis or threat to the airway/swallowing.  Rx for Diflucan sent to her pharmacy for empiric treatment of vaginal yeast infection following course of antibiotics.  We discussed that we are happy to remain available as needed should symptomology/imaging dictate potential benefit of further radiation.  She continues close oncologic management and follow-up in the GYN oncology clinic  currently.    RECOMMENDATIONS:  Continues under the care of Dr. Evans with systemic treatment.  We are happy to remain available as needed per GYN oncology recommendations should radiation therapy be of benefit.    Return if symptoms worsen or fail to improve, for Office Visit.    Adrien Barrera, APRN

## 2021-02-01 NOTE — PROGRESS NOTES
"Ele Strickland  [id number]  1950      Reason for visit: History of endometrial cancer, now with biopsy-proven lung metastasis  Original referring provider:  Kaylen Taylor DO     History of present illness:  The patient is a 70 y.o. year old female who presents today for treatment and evaluation of the above issues.    In review: Patient underwent definitive radiation for her stage II grade 2 endometrioid adenocarcinoma.  Patient was seen on 11/20/2020 by Dr. Yogi Boyd due to complaints of hemoptysis.  She had undergone chest x-ray as a part of her preop evaluation in 2019 without lesions noted.  She had a subsequent CT scan which showed likely inflammatory changes.  She has longstanding history of tobacco abuse.  She underwent CT scan 10/30/2020 and imaging of the abdomen and pelvis was essentially negative for metastatic disease.  However, CT scan of the chest showed multiple lesions and hilar adenopathy.  Patient underwent CT-guided biopsy of lung lesion and pathology showed recurrent endometrial cancer.    She elected to undergo chemotherapy with cycle 1 of Carbo/Taxol on 12/22 with subsequent fatigue and decreased PO intake requiring IV fluids.  She had port placement on 1/11 and cycle 2 of chemo on 1/12.    Today, she has several complaints.  Her spinal stenosis is causing her to have left leg and hip pain that shoots down the back.  She has been getting steroid injections from Dr. Hou in [facility] that seem to help.  Most recent injection in December.  She has notable weight loss of 9lb since last appointment.  She reports poor appetite with \"nothing tasting good\" and dry mouth.  She admits that she does not drink much water.  She is taking about 1 boost shake per day but also dislikes their taste.  She continues to smoke about 1 pack per day.  She believes her weight loss may be related to her Diabetes.  She has a new glucometer that has not been working properly to allow her to check sugars at home.  " She continues to have a cough that is stable and non-bloody.  She endorses some congestion and nasal drainage irritating her cough but improved with Claritin use.  She is due for Cycle #3 of chemo today but forgot to take her pre-chemo steroid injection last night.      OBGYN History:  She is a .  She does not currently use HRT, but was briefly on progesterone around the time that she went through menopause. She does not have a history of abnormal pap smears.      Oncologic History:  Oncology/Hematology History   Endometrial cancer (CMS/HCC)   10/23/2019 Initial Diagnosis    Patient referred to Gyn Oncology due to postmenopausal bleeding x 10 months and pap smear showing atypical glandular cells with negative HPV.   Endometrial biopsy obtained. Pathology revealed grade 2 endometrioid adenocarcinoma.      2019 Imaging    Pre-op CT abdomen pelvis showed lobular hypoenhancing mass in the uterus and the cervix, presumably known neoplasm. Incidental right lobe liver hemangioma noted, unchanged from previous imaging. No evidence of metastatic disease.      11/15/2019 Surgery    Type 1 radical RTLH/BSO, lysis of adhesions, and bilateral pelvic lymph node dissection.     Final pathology showed 9.5 x 6.0 x 2.5 cm grade 2 EAC with focal mucinous and secretory features, invasive into >90% of the myometrium. Tumor invades cervical stroma. No lymphvascular invasion and nodes negative.  MSI by IHC showed absent MLH1 and PMS2, reflex hypermethylation positive = sporadic tumor.  Stage II grade 2     2020 - 3/11/2020 Radiation    Adjuvant external beam radiation plus vaginal brachytherapy.  The pelvis received a dose of 45 Gy in 25 fractions, followed by a boost to the apex of the vagina with 12 Gy in 2 fractions.  Treatments tolerated well.      2020 Survivorship    Survivorship Care Plan completed and discussed with patient.  Copy of Survivorship Care Plan provided to patient and primary care provider.      12/14/2020 Molecular Testing    CARIS results:  MMR deficient/MSI high--level 1 for pembrolizumab  ER positive 2+, 85%; DC positive 2+, 65%  ARID1A pathogenic variant, PTEN pathogenic variant  BRCA 1/2 negative, PD-L1 negative     12/22/2020 -  Chemotherapy    OP ENDOMETRIAL PACLitaxel / CARBOplatin (Q21D)           Past Medical History:   Diagnosis Date   • Achalasia, esophageal    • Anxiety and depression    • Arthritis    • Chronic back pain    • Chronic bronchitis (CMS/HCC)    • COPD (chronic obstructive pulmonary disease) (CMS/HCC)     no home O2   • Diabetes (CMS/HCC)    • Endometrial cancer (CMS/HCC) 11/2019    Stage II   • Essential hypertension 7/5/2019   • Frequent urination    • Gall stones    • GERD (gastroesophageal reflux disease)    • Heart murmur    • High blood pressure    • History of brachytherapy 03/11/2020    vagina   • History of prolapse of bladder    • History of radiation therapy 03/03/2020    Pelvis   • Pinched nerve     back   • Wears dentures    • Wears glasses        Past Surgical History:   Procedure Laterality Date   • COLONOSCOPY  01/2020   • ENDOSCOPY     • EPIDURAL  02/2020   • HELLER MYOTOMY LAPAROSCOPIC WITH ENDOSCOPY  2012   • TOTAL LAPAROSCOPIC HYSTERECTOMY SALPINGO OOPHORECTOMY Bilateral 11/15/2019    Procedure: TYPE 1 RADICAL TOTAL LAPAROSCOPIC HYSTERECTOMY BILATERAL SALPINGOOPHORECTOMY, LYSIS OF ADHESTIONS, BILATERAL PELVIC LYMPH NODE DISSECTION WITH DAVINCI ROBOT;  Surgeon: Zuly Evans MD;  Location: [facility];  Service: DaVinci   • UPPER GASTROINTESTINAL ENDOSCOPY  01/2020       MEDICATIONS: The current medication list was reviewed with the patient and updated in the EMR this date per the Medical Assistant. Medication dosages and frequencies were confirmed to be accurate.      Allergies:  is allergic to augmentin [amoxicillin-pot clavulanate] and biaxin [clarithromycin].    Social History:   Social History     Socioeconomic History   • Marital status:       Spouse name: Not on file   • Number of children: 2   • Years of education: Not on file   • Highest education level: Not on file   Tobacco Use   • Smoking status: Current Every Day Smoker     Packs/day: 1.00     Years: 30.00     Pack years: 30.00     Types: Cigarettes   • Smokeless tobacco: Never Used   Substance and Sexual Activity   • Alcohol use: No     Frequency: Never   • Drug use: No   • Sexual activity: Defer   Social History Narrative        Has two children    Works as a  at [employer]    Continues to smoke at least 1 pack a day as she has her adult life    Denies regular alcohol use       Family History:    Family History   Problem Relation Age of Onset   • Heart attack Mother    • Uterine cancer Mother 84   • Lung cancer Brother    • Colon cancer Half-Sister 50   • Colon cancer Paternal Aunt    • Colon cancer Cousin    • Heart attack Brother    • Hypertension Daughter    • Hypertension Son        Health Maintenance:    Health Maintenance   Topic Date Due   • MAMMOGRAM  1950   • URINE MICROALBUMIN  1950   • ANNUAL PHYSICAL  04/02/1953   • TDAP/TD VACCINES (1 - Tdap) 04/02/1969   • Pneumococcal Vaccine 65+ (1 of 1 - PPSV23) 04/02/2015   • ZOSTER VACCINE (2 of 3) 07/25/2016   • HEPATITIS C SCREENING  10/23/2019   • DIABETIC FOOT EXAM  10/23/2019   • DIABETIC EYE EXAM  10/23/2019   • HEMOGLOBIN A1C  05/14/2020   • INFLUENZA VACCINE  08/01/2020   • LUNG CANCER SCREENING  10/30/2021   • COLONOSCOPY  01/21/2030   • MENINGOCOCCAL VACCINE  Aged Out       Review of Systems   Constitutional: Positive for appetite change (poor appetite), fatigue and unexpected weight change. Negative for chills and fever.        9lb weight loss since 1/12   HENT: Positive for congestion, postnasal drip and sinus pressure. Negative for ear discharge, ear pain, hearing loss, mouth sores, nosebleeds, rhinorrhea, sinus pain, sore throat, tinnitus and trouble swallowing.    Eyes: Positive for itching and  "visual disturbance (blurred vision). Negative for pain.   Respiratory: Positive for cough and shortness of breath. Negative for wheezing.    Cardiovascular: Negative for chest pain and palpitations.        Heart murmur   Gastrointestinal: Negative for abdominal pain, blood in stool, constipation, diarrhea, nausea and vomiting.        Heartburn   Endocrine: Negative for heat intolerance (no hot flashes).   Genitourinary: Negative for difficulty urinating, flank pain, frequency, hematuria and urgency.        History of urinary and fecal incontinence   Musculoskeletal: Positive for arthralgias (bilateral shoulders), back pain (pinched nerve), gait problem (limping), joint swelling and myalgias.   Skin: Negative for color change, rash and wound.   Allergic/Immunologic: Negative for immunocompromised state.   Neurological: Positive for numbness. Negative for dizziness, seizures, syncope, weakness and headaches.   Hematological: Negative for adenopathy. Bruises/bleeds easily.   Psychiatric/Behavioral: Positive for dysphoric mood. Negative for agitation, confusion and sleep disturbance. The patient is not nervous/anxious.    All other systems reviewed and are negative.      Physical Exam    Vitals:    02/03/21 0926   BP: 135/61   Pulse: 78   Resp: 16   Temp: 97.8 °F (36.6 °C)   TempSrc: Temporal   Weight: 57.1 kg (125 lb 12.8 oz)   Height: 162.6 cm (64.02\")   PainSc:   8     Body mass index is 21.58 kg/m².    Wt Readings from Last 3 Encounters:   02/03/21 57.1 kg (125 lb 12.8 oz)   01/12/21 60.8 kg (134 lb)   01/04/21 59.9 kg (132 lb)         GENERAL: Alert, tired-appearing thin female appearing her stated age who is in no apparent distress.   HEENT: Sclera anicteric. Head normocephalic, atraumatic. Mucus membranes moist.   NECK: Trachea midline, supple, without masses.  No thyromegaly.   BREASTS: Deferred  CARDIOVASCULAR: Normal rate, regular rhythm,+ systolic ejection murmur, no rubs, or gallops.  No peripheral " edema.  RESPIRATORY: Clear to auscultation bilaterally, normal respiratory effort with occasional cough  BACK:  No CVA tenderness, no vertebral tenderness on palpation  GASTROINTESTINAL:  Abdomen is soft, non-tender, non-distended, no rebound or guarding, no masses, or hernias. No HSM.  SKIN:  Warm, dry, well-perfused.  All visible areas intact.  Ecchymosis of the forearms.  Mild venous stasis bilateral lower extremities. Left sided port with some scabbing but healing well  PSYCHIATRIC: AO x3, with appropriate affect, normal thought processes.  NEUROLOGIC: No focal deficits.  Moves extremities well.   MUSCULOSKELETAL: Hesitant gait, shifting station. Walks with limp favoring left leg  EXTREMITIES:   No cyanosis, clubbing, symmetric.  LYMPHATICS:  No cervical or inguinal adenopathy noted.     PELVIC exam: Deferred.    ECOG PS 1    PROCEDURES: None      Diagnostic Data:     Lab Results   Component Value Date    WBC 4.40 02/03/2021    HGB 8.5 (L) 02/03/2021    HCT 27.0 (L) 02/03/2021    MCV 84.4 02/03/2021     02/03/2021    NEUTROABS 3.40 02/03/2021    GLUCOSE 87 02/03/2021    BUN 21 02/03/2021    CREATININE 0.70 02/03/2021    EGFRIFNONA 93 02/03/2021     02/03/2021    K 3.6 02/03/2021     02/03/2021    CO2 31.0 (H) 02/03/2021    CALCIUM 9.6 02/03/2021    ALBUMIN 3.90 02/03/2021    AST 15 02/03/2021    ALT 9 02/03/2021    BILITOT <0.2 02/03/2021     No results found for:         Assessment/Plan   This is a 70 y.o. woman with biopsy-proven recurrent endometrial cancer with lung metastasis and mediastinal adenopathy.  Encounter Diagnoses   Name Primary?   • Endometrial cancer (CMS/HCC) Yes   • Malignant neoplasm metastatic to lung, unspecified laterality (CMS/HCC)    • Chemotherapy-induced fatigue    • Weight loss    • Tobacco abuse    • Cigarette nicotine dependence with nicotine-induced disorder    • Dehydration        History of endometrial cancer as detailed above, now with recurrence  -  Initial diagnosis in October 2019, s/p Type 1 radical RTLH/BSO in November with radiation tx in 2020.  Recurrence diagnosis in November 2020  - Tumor has been sent for genetic profiling with EMILIANA results: MMR deficient/MSI high--level 1 for pembrolizumab, ER positive 2+, 85%; WI positive 2+, 65%.  ARID1A pathogenic variant, PTEN pathogenic variant, BRCA 1/2 negative, PD-L1 negative  - Treatment options and side effects discussed last appointment and patient elected to proceed with chemotherapy: combined carboplatin at AUC of 6+ paclitaxel at 175 mg/m² IV q. 21 days x 6  - Cycle 1 received 12/22 with subsequent fatigue, mouth sores and poor PO intake requiring IV fluids.  S/p Port-a-cath placement on 1/11. Cycle 2 received 1/12  - Missed pre-chemo steroid yesterday.  Patient counseled on risks and plan to continue with Cycle #3 today   -Dose modification of both carboplatin and paclitaxel today related to weight loss, neuropathy    Weight loss  - Lost 9lb since last visit on 1/12.  Encouraged Boost use or making home protein shakes for better taste.  - Nexium use to decrease GERD symptoms and increased water intake for dry mouth symptoms  - Discussed smoking cessation to promote appetite  - Nutrition consult placed to be counseled during chemo    Tobacco use  - currently about 1pack per day  - counseled on cessation as stated above     Diabetes Mellitis  - Managed by Dr. Acosta, currently on Metformin  - glucometer not functioning and instructed patient to call for assistance so she can monitor her sugars     Spinal Stenosis with left leg pain, bilateral foot neuropathy-patient states neuropathy not related to chemotherapy  - Managed by Dragan Hou in Ortho with cortisone injections.  Most recent injection in December  -Difficulty walking at today's visit with significant limp.  -Despite patient saying that her chemotherapy is not affecting her neuropathy, I have concerns as her mobility is significantly impaired  today. We will decrease paclitaxel.    Pain assessment was performed today as a part of patient’s care.  For patients with pain related to surgery, gynecologic malignancy or cancer treatment, the plan is as noted in the assessment/plan.  For patients with pain not related to these issues, they are to seek any further needed care from a more appropriate provider, such as PCP.    Orders Placed This Encounter   Procedures   • CT Chest With Contrast     Standing Status:   Future     Standing Expiration Date:   2/3/2022   • Comprehensive metabolic panel     Standing Status:   Future     Number of Occurrences:   1     Standing Expiration Date:   2/3/2022   • Ambulatory Referral to OP ONC Nutrition Services     Referral Priority:   Urgent     Referral Type:   Clinical Pathway     Referral Reason:   Specialty Services Required     Number of Visits Requested:   1   • CBC and Differential     Standing Status:   Future     Number of Occurrences:   1     Standing Expiration Date:   2/3/2022     Order Specific Question:   Manual Differential     Answer:   No     FOLLOW UP: 3weeks    Patient was seen and examined with Dr. Catherine,  resident, who performed portions of the examination and documentation for this patient's care under my direct supervision.  I agree with the above documentation and plan.    Zuly Evans MD  02/03/21  12:56 EST

## 2021-02-02 PROBLEM — T45.1X5A CHEMOTHERAPY-INDUCED FATIGUE: Status: ACTIVE | Noted: 2021-01-01

## 2021-02-02 PROBLEM — R53.83 CHEMOTHERAPY-INDUCED FATIGUE: Status: ACTIVE | Noted: 2021-01-01

## 2021-02-03 NOTE — PROGRESS NOTES
"Onc Nutrition    Patient: Ele Strickland  YOB: 1950    Cancer Dx: recurrent endometrial cancer with new lung metastasis   Surgery: Hysterectomy, bilateral salpingo-oophorectomy and lymph node dissection (11/15/19)  Chemotherapy: Carbo / Taxol - every 21 days x 6 (cycle 3 today)  Radiation: 45 Gray to the pelvis and 12 Gy in 2 fractions utilizing iridium 192 and a vaginal cylinder to boost the apex of the vagina (1/24/20 - 3/11/20)    Weight: 125# / 9# weight loss x 6 weeks    Nutrition Diagnosis      Problem Severe chronic disease related malnutrition   Inadequate oral intake  Weight loss   Etiology Diagnosis of recurrent cancer  Nutritional impact symptoms as above   Signs / Symptoms Muscle wasting  ~50# (28.6%) weight loss x ~6 months (total)      Follow up with patient during her infusion appointment.  She continues to complain of early satiety, taste changes and decreased appetite.  She reports trying the ONS samples provided at our previous visit and liked / tolerated the Ensure Plus better but did not purchase any.  She reports she has been drinking Glucerna or Ensure High Protein ~2 (total) per day and is eating 1 meal per day.  She has not tried the baking soda / salt water mouth rinses as recommended at our previous visit.    Recommended she switch to Ensure Plus as it is more calorically dense than the ones she is currently drinking.  Provided coupons for Ensure products.  Strongly encouraged her to be eating smaller more frequent snacks (5-6) throughout the day to aid with oral intake and early satiety symptoms.  Advised her to be choosing high calorie / protein and nutrient dense foods and offered several ideas she may find more appealing at this time.  Also strongly encouraged her to be using the baking soda / salt mouth rinse to aid with taste changes.  Provided and reviewed several written diet materials to reinforce information discussed (\"Soft and Moist High Protein Menu Ideas\", \"Taste " "and Smell Changes\" and \"Making the Most of Each Bite\").    Answered her questions and she voiced understanding of information discussed.  Encouraged to call RD with questions.  Will monitor as needed during her treatment course.    Kalani Adame RD  02/03/21          "

## 2021-02-22 NOTE — PROGRESS NOTES
Ele Strickland  [id number]  1950      Reason for visit: History of endometrial cancer, now with biopsy-proven lung metastasis  Original referring provider:  Kaylen Taylor DO     History of present illness:  The patient is a 70 y.o. year old female who presents today for treatment and evaluation of the above issues.    In review: Patient underwent definitive radiation for her stage II grade 2 endometrioid adenocarcinoma.  Patient was seen on 2020 by Dr. Yogi Boyd due to complaints of hemoptysis.  She had undergone chest x-ray as a part of her preop evaluation in  without lesions noted.  She had a subsequent CT scan which showed likely inflammatory changes.  She has longstanding history of tobacco abuse.  She underwent CT scan 10/30/2020 and imaging of the abdomen and pelvis was essentially negative for metastatic disease.  However, CT scan of the chest showed multiple lesions and hilar adenopathy.  Patient underwent CT-guided biopsy of lung lesion and pathology showed recurrent endometrial cancer.    She elected to undergo chemotherapy and is now status post cycle 3 of dose reduced Carbo/Taxol on 2/3.  She was scheduled to get a CT scan of the chest, but had to reschedule due to inclement weather.  CT is now scheduled for 3/4/2021.  Today, she continues to have left leg pain related to her spinal stenosis. She last saw orthopedics in December when she got a steroid injection that has since worn off.  She is not taking her gabapen three times per day as instrcuted and is only using it once at night.  She has gained weight since last appointment and has improved appetite. She continues to smoke about 1 pack per day.   She has a new glucometer and claims she does not know how to program it, so she has not been checking her sugars.  She continues to have a cough that is stable and non-bloody. She is due for Cycle #4 of chemo today.     OBGYN History:  She is a .  She does not currently use HRT,  but was briefly on progesterone around the time that she went through menopause. She does not have a history of abnormal pap smears.      Oncologic History:  Oncology/Hematology History   Endometrial cancer (CMS/HCC)   10/23/2019 Initial Diagnosis    Patient referred to Gyn Oncology due to postmenopausal bleeding x 10 months and pap smear showing atypical glandular cells with negative HPV.   Endometrial biopsy obtained. Pathology revealed grade 2 endometrioid adenocarcinoma.      11/11/2019 Imaging    Pre-op CT abdomen pelvis showed lobular hypoenhancing mass in the uterus and the cervix, presumably known neoplasm. Incidental right lobe liver hemangioma noted, unchanged from previous imaging. No evidence of metastatic disease.      11/15/2019 Surgery    Type 1 radical RTLH/BSO, lysis of adhesions, and bilateral pelvic lymph node dissection.     Final pathology showed 9.5 x 6.0 x 2.5 cm grade 2 EAC with focal mucinous and secretory features, invasive into >90% of the myometrium. Tumor invades cervical stroma. No lymphvascular invasion and nodes negative.  MSI by IHC showed absent MLH1 and PMS2, reflex hypermethylation positive = sporadic tumor.  Stage II grade 2     1/24/2020 - 3/11/2020 Radiation    Adjuvant external beam radiation plus vaginal brachytherapy.  The pelvis received a dose of 45 Gy in 25 fractions, followed by a boost to the apex of the vagina with 12 Gy in 2 fractions.  Treatments tolerated well.      5/7/2020 Survivorship    Survivorship Care Plan completed and discussed with patient.  Copy of Survivorship Care Plan provided to patient and primary care provider.     12/14/2020 Molecular Testing    CARIS results:  MMR deficient/MSI high--level 1 for pembrolizumab  ER positive 2+, 85%; NE positive 2+, 65%  ARID1A pathogenic variant, PTEN pathogenic variant  BRCA 1/2 negative, PD-L1 negative     12/22/2020 -  Chemotherapy    OP ENDOMETRIAL PACLitaxel / CARBOplatin (Q21D)           Past Medical History:    Diagnosis Date   • Achalasia, esophageal    • Anxiety and depression    • Arthritis    • Chronic back pain    • Chronic bronchitis (CMS/[facility])    • COPD (chronic obstructive pulmonary disease) (CMS/[facility])     no home O2   • Diabetes (CMS/[facility])    • Endometrial cancer (CMS/[facility]) 11/2019    Stage II   • Essential hypertension 7/5/2019   • Frequent urination    • Gall stones    • GERD (gastroesophageal reflux disease)    • Heart murmur    • High blood pressure    • History of brachytherapy 03/11/2020    vagina   • History of prolapse of bladder    • History of radiation therapy 03/03/2020    Pelvis   • Pinched nerve     back   • Spinal stenosis    • Wears dentures    • Wears glasses        Past Surgical History:   Procedure Laterality Date   • COLONOSCOPY  01/2020   • ENDOSCOPY     • EPIDURAL  02/2020   • HELLER MYOTOMY LAPAROSCOPIC WITH ENDOSCOPY  2012   • TOTAL LAPAROSCOPIC HYSTERECTOMY SALPINGO OOPHORECTOMY Bilateral 11/15/2019    Procedure: TYPE 1 RADICAL TOTAL LAPAROSCOPIC HYSTERECTOMY BILATERAL SALPINGOOPHORECTOMY, LYSIS OF ADHESTIONS, BILATERAL PELVIC LYMPH NODE DISSECTION WITH DAVINCI ROBOT;  Surgeon: Zuly Evans MD;  Location: [facility];  Service: DaVinci   • UPPER GASTROINTESTINAL ENDOSCOPY  01/2020       MEDICATIONS: The current medication list was reviewed with the patient and updated in the EMR this date per the Medical Assistant. Medication dosages and frequencies were confirmed to be accurate.      Allergies:  is allergic to augmentin [amoxicillin-pot clavulanate] and biaxin [clarithromycin].    Social History:   Social History     Socioeconomic History   • Marital status:      Spouse name: Not on file   • Number of children: 2   • Years of education: Not on file   • Highest education level: Not on file   Tobacco Use   • Smoking status: Current Every Day Smoker     Packs/day: 1.00     Years: 30.00     Pack years: 30.00     Types: Cigarettes   • Smokeless tobacco: Never Used   Substance and  Sexual Activity   • Alcohol use: No     Frequency: Never   • Drug use: No   • Sexual activity: Defer   Social History Narrative        Has two children    Works as a  at [employer]    Continues to smoke at least 1 pack a day as she has her adult life    Denies regular alcohol use       Family History:    Family History   Problem Relation Age of Onset   • Heart attack Mother    • Uterine cancer Mother 84   • Lung cancer Brother    • Colon cancer Half-Sister 50   • Colon cancer Paternal Aunt    • Colon cancer Cousin    • Heart attack Brother    • Hypertension Daughter    • Hypertension Son        Health Maintenance:    Health Maintenance   Topic Date Due   • MAMMOGRAM  1950   • URINE MICROALBUMIN  1950   • DXA SCAN  1950   • ANNUAL PHYSICAL  04/02/1953   • TDAP/TD VACCINES (1 - Tdap) 04/02/1969   • Pneumococcal Vaccine 65+ (1 of 1 - PPSV23) 04/02/2015   • ZOSTER VACCINE (2 of 3) 07/25/2016   • HEPATITIS C SCREENING  10/23/2019   • DIABETIC FOOT EXAM  10/23/2019   • DIABETIC EYE EXAM  10/23/2019   • HEMOGLOBIN A1C  05/14/2020   • INFLUENZA VACCINE  08/01/2020   • LUNG CANCER SCREENING  10/30/2021   • COLONOSCOPY  01/21/2030   • MENINGOCOCCAL VACCINE  Aged Out       Review of Systems   Constitutional: Positive for appetite change (poor appetite), fatigue and unexpected weight change. Negative for chills and fever.        9lb weight loss since 1/12   HENT: Positive for congestion, postnasal drip and sinus pressure. Negative for ear discharge, ear pain, hearing loss, mouth sores, nosebleeds, rhinorrhea, sinus pain, sore throat, tinnitus and trouble swallowing.    Eyes: Positive for itching and visual disturbance (blurred vision). Negative for pain.   Respiratory: Positive for cough and shortness of breath. Negative for wheezing.    Cardiovascular: Negative for chest pain and palpitations.        Heart murmur   Gastrointestinal: Negative for abdominal pain, blood in stool,  "constipation, diarrhea, nausea and vomiting.        Heartburn   Endocrine: Negative for heat intolerance (no hot flashes).   Genitourinary: Negative for difficulty urinating, flank pain, frequency, hematuria and urgency.        History of urinary and fecal incontinence   Musculoskeletal: Positive for arthralgias (bilateral shoulders), back pain (pinched nerve), gait problem (limping), joint swelling and myalgias.   Skin: Negative for color change, rash and wound.   Allergic/Immunologic: Negative for immunocompromised state.   Neurological: Positive for numbness. Negative for dizziness, seizures, syncope, weakness and headaches.   Hematological: Negative for adenopathy. Bruises/bleeds easily.   Psychiatric/Behavioral: Positive for dysphoric mood. Negative for agitation, confusion and sleep disturbance. The patient is not nervous/anxious.    All other systems reviewed and are negative.      Physical Exam    Vitals:    02/24/21 0854   BP: 124/65  Comment: RUE   Pulse: 102   Resp: 20   Temp: 97.3 °F (36.3 °C)   TempSrc: Infrared   SpO2: 94%  Comment: RA   Weight: 58.1 kg (128 lb)   Height: 162.6 cm (64\")   PainSc:   8   PainLoc: Leg  Comment: Left     Body mass index is 21.97 kg/m².    Wt Readings from Last 3 Encounters:   02/24/21 58.1 kg (128 lb)   02/03/21 57.1 kg (125 lb 12.8 oz)   01/12/21 60.8 kg (134 lb)         GENERAL: Alert, tired-appearing thin female appearing her stated age who is in no apparent distress.   HEENT: Sclera anicteric. Head normocephalic, atraumatic. Mucus membranes moist.   NECK: Trachea midline, supple, without masses.  No thyromegaly.   BREASTS: Deferred  CARDIOVASCULAR: Normal rate, regular rhythm,+ systolic ejection murmur, no rubs, or gallops.  No peripheral edema.  RESPIRATORY: Clear to auscultation bilaterally, normal respiratory effort with occasional cough  BACK:  No CVA tenderness, no vertebral tenderness on palpation  GASTROINTESTINAL:  Abdomen is soft, non-tender, non-distended, no " rebound or guarding, no masses, or hernias. No HSM.  SKIN:  Warm, dry, well-perfused.  All visible areas intact.  Ecchymosis of the forearms.  Mild venous stasis bilateral lower extremities. Left sided port with erythema concerning for infection   PSYCHIATRIC: AO x3, with appropriate affect, normal thought processes.  NEUROLOGIC: No focal deficits.  Moves extremities well.   MUSCULOSKELETAL: Hesitant gait, shifting station. Walks with limp favoring left leg  EXTREMITIES:   No cyanosis, clubbing, symmetric.  LYMPHATICS:  No cervical or inguinal adenopathy noted.     PELVIC exam: Deferred.    ECOG PS 1    PROCEDURES: None      Diagnostic Data:     Lab Results   Component Value Date    WBC 2.20 (L) 02/24/2021    HGB 8.3 (L) 02/24/2021    HCT 26.0 (L) 02/24/2021    MCV 84.8 02/24/2021     (L) 02/24/2021    NEUTROABS 1.80 02/24/2021    GLUCOSE 218 (H) 02/24/2021    BUN 25 (H) 02/24/2021    CREATININE 0.80 02/24/2021    CREATININE 0.80 02/24/2021    EGFRIFNONA 71 02/24/2021     02/24/2021    K 4.3 02/24/2021     02/24/2021    CO2 24.0 02/24/2021    CALCIUM 9.2 02/24/2021    ALBUMIN 3.80 02/24/2021    AST 14 02/24/2021    ALT 9 02/24/2021    BILITOT <0.2 02/24/2021     No results found for:         Assessment/Plan   This is a 70 y.o. woman with biopsy-proven recurrent endometrial cancer with lung metastasis and mediastinal adenopathy.  Encounter Diagnoses   Name Primary?   • Endometrial cancer (CMS/HCC) Yes   • Malignant neoplasm metastatic to lung, unspecified laterality (CMS/HCC)    • Chemotherapy-induced fatigue    • Type 2 diabetes mellitus without complication, without long-term current use of insulin (CMS/HCC)    • Tobacco abuse    • Dehydration        History of endometrial cancer as detailed above, now with recurrence  - Initial diagnosis in October 2019, s/p Type 1 radical RTLH/BSO in November with radiation tx in 2020.  Recurrence diagnosis in November 2020  - Tumor has been sent for  genetic profiling with CARIS results: MMR deficient/MSI high--level 1 for pembrolizumab, ER positive 2+, 85%; NH positive 2+, 65%.  ARID1A pathogenic variant, PTEN pathogenic variant, BRCA 1/2 negative, PD-L1 negative  - Treatment options and side effects discussed last appointment and patient elected to proceed with chemotherapy: combined carboplatin at AUC of 6+ paclitaxel at 175 mg/m² IV q. 21 days x 6  - Cycle 1 received 12/22 with subsequent fatigue, mouth sores and poor PO intake requiring IV fluids.  S/p Port-a-cath placement on 1/11.   - Dose modification of both carboplatin and paclitaxel following cycle #2 related to weight loss, neuropathy  - continue with Cycle #4 today, pending labs  - Due for imaging before next visit as noted above    Chemotherapy-induced anemia  -Blood transfusion 2/25/2021 as patient is symptomatic    Port site cellulitis  - Erythema overlying port site. Bactrim Rx provided for possible early infection     Weight loss, improved, dehydration  -PRN IV fluids  - Gained 3lb since last visit  - Nexium use to decrease GERD symptoms and increased water intake for dry mouth symptoms  - Discussed smoking cessation to promote appetite  - Nutrition consult previously placed     Tobacco use  - currently about 1pack per day  - counseled on cessation as stated above     Diabetes Mellitis  - Managed by Dr. Acosta, currently on Metformin  - glucometer not functioning and patient unable to program it.  Diabetes education consulted and will call patient to discuss.     Spinal Stenosis with left leg pain, bilateral foot neuropathy-patient states neuropathy not related to chemotherapy  - Managed by Dragan Hou in Ortho with cortisone injections.  Last injection in December  - Difficulty walking at today's visit with significant limp.  - Despite patient saying that her chemotherapy is not affecting her neuropathy, I have concerns as her mobility is significantly impaired. Dose reduced paclitaxel following  Cycle #2.  - Counseled on Gabapentin use more than once per day for pain     Pain assessment was performed today as a part of patient’s care.  For patients with pain related to surgery, gynecologic malignancy or cancer treatment, the plan is as noted in the assessment/plan.  For patients with pain not related to these issues, they are to seek any further needed care from a more appropriate provider, such as PCP.    Orders Placed This Encounter   Procedures   • Comprehensive metabolic panel     Standing Status:   Future     Number of Occurrences:   1     Standing Expiration Date:   2/24/2022   • Ambulatory Referral to Nutrition Services     Referral Priority:   Routine     Referral Type:   Health Education     Referral Reason:   Specialty Services Required     Number of Visits Requested:   1   • CBC and Differential     Standing Status:   Future     Number of Occurrences:   1     Standing Expiration Date:   2/24/2022     Order Specific Question:   Manual Differential     Answer:   No     FOLLOW UP: 3weeks      Patient was seen and examined with Dr. Catherine,  resident, who performed portions of the examination and documentation for this patient's care under my direct supervision.  I agree with the above documentation and plan.    Zuly Evans MD  02/24/21  10:02 EST

## 2021-02-24 PROBLEM — D64.81 ANTINEOPLASTIC CHEMOTHERAPY INDUCED ANEMIA: Status: ACTIVE | Noted: 2021-01-01

## 2021-02-24 PROBLEM — T45.1X5A ANTINEOPLASTIC CHEMOTHERAPY INDUCED ANEMIA: Status: ACTIVE | Noted: 2021-01-01

## 2021-02-25 NOTE — PROGRESS NOTES
"Onc Nutrition    Patient: Ele Strickland  YOB: 1950    Cancer Dx: recurrent endometrial cancer with new lung metastasis   Surgery: Hysterectomy, bilateral salpingo-oophorectomy and lymph node dissection (11/15/19)  Chemotherapy: Carbo / Taxol - every 21 days x 6 (cycle 4 today)  Radiation: 45 Gray to the pelvis and 12 Gy in 2 fractions utilizing iridium 192 and a vaginal cylinder to boost the apex of the vagina (1/24/20 - 3/11/20)    Weight - 128# / 3# weight gain x 3 weeks     Nutrition Diagnosis      Problem Severe chronic disease related malnutrition   Inadequate oral intake  Weight loss   Etiology Diagnosis of recurrent cancer  Nutritional impact symptoms as above   Signs / Symptoms Muscle wasting  ~50# (28.6%) weight loss x ~6 months (total)      Follow up with patient during her infusion appointment.  Patient continues to complain of decreased appetite and taste changes.  She states she is trying to eat higher calorie / protein foods and is also trying to eat more often.  She continues to drink ~2 Boost and/or Ensure daily and is choosing \"plus\" when those are available.  She is only using the baking soda / salt mouth rinse occasionally.    Encouraged her to continue choosing high calorie / protein foods and to be eating smaller portions more frequently throughout the day.  Also encouraged her to continue to purchase Boost Plus and Ensure Plus when available and to drink 2 daily to aid with oral intake.  She denies the need for coupons at this time.  Recommended she use the baking soda / salt mouth rinse before each time she eats to improve sense of taste.      Answered her questions and she voiced understanding of information discussed.  Encouraged to call RD with questions.  Will monitor as needed during her treatment course.  Note referral has been sent to diabetes educator for assistance with her new glucometer.    Kalani Adame RD  02/24/21          "

## 2021-03-03 NOTE — TELEPHONE ENCOUNTER
----- Message from Porsche Borden MD sent at 3/2/2021  3:18 PM EST -----    ----- Message -----  From: Interface, Rad Results [place] [facility]  Sent: 3/2/2021   3:10 PM EST  To: Zuly Evans MD

## 2021-03-16 PROBLEM — L03.90 CELLULITIS: Status: ACTIVE | Noted: 2021-01-01

## 2021-03-16 NOTE — PROGRESS NOTES
Ele Strickland  [id number]  1950      Reason for visit: History of endometrial cancer, now with biopsy-proven lung metastasis  Original referring provider:  Kaylen Taylor DO     History of present illness:  The patient is a 70 y.o. year old female who presents today for treatment and evaluation of the above issues.    In review: Patient underwent definitive radiation for her stage II grade 2 endometrioid adenocarcinoma.  Patient was seen on 2020 by Dr. Yogi Boyd due to complaints of hemoptysis.  She had undergone chest x-ray as a part of her preop evaluation in 2019 without lesions noted.  She had a subsequent CT scan which showed likely inflammatory changes.  She has longstanding history of tobacco abuse.  She underwent CT scan 10/30/2020 and imaging of the abdomen and pelvis was essentially negative for metastatic disease.  However, CT scan of the chest showed multiple lesions and hilar adenopathy.  Patient underwent CT-guided biopsy of lung lesion and pathology showed recurrent endometrial cancer.    She elected to undergo chemotherapy and is now status post cycle 4 of dose reduced Carbo/Taxol.  She had a CT chest on 3/4/2021 which showed a treatment response.  Today, she continues to have left leg pain related to her spinal stenosis. She reports she will be seeing Orthopedics this coming Friday for another epidural injection. She has lost weight since last appointment and reports that nothing tastes good but is eating pasta, ice cream, and Ensure/Glucerna. She continues to smoke about 1 pack per day.   She has a new glucometer and claims she does not know how to program it, so she has not been checking her sugars.  She reports some blood-tinged mucous when she blows her nose. She is due for Cycle #5 of chemo today.     OBGYN History:  She is a .  She does not currently use HRT, but was briefly on progesterone around the time that she went through menopause. She does not have a history of  abnormal pap smears.      Oncologic History:  Oncology/Hematology History   Endometrial cancer (CMS/HCC)   10/23/2019 Initial Diagnosis    Patient referred to Gyn Oncology due to postmenopausal bleeding x 10 months and pap smear showing atypical glandular cells with negative HPV.   Endometrial biopsy obtained. Pathology revealed grade 2 endometrioid adenocarcinoma.      11/11/2019 Imaging    Pre-op CT abdomen pelvis showed lobular hypoenhancing mass in the uterus and the cervix, presumably known neoplasm. Incidental right lobe liver hemangioma noted, unchanged from previous imaging. No evidence of metastatic disease.      11/15/2019 Surgery    Type 1 radical RTLH/BSO, lysis of adhesions, and bilateral pelvic lymph node dissection.     Final pathology showed 9.5 x 6.0 x 2.5 cm grade 2 EAC with focal mucinous and secretory features, invasive into >90% of the myometrium. Tumor invades cervical stroma. No lymphvascular invasion and nodes negative.  MSI by IHC showed absent MLH1 and PMS2, reflex hypermethylation positive = sporadic tumor.  Stage II grade 2     1/24/2020 - 3/11/2020 Radiation    Adjuvant external beam radiation plus vaginal brachytherapy.  The pelvis received a dose of 45 Gy in 25 fractions, followed by a boost to the apex of the vagina with 12 Gy in 2 fractions.  Treatments tolerated well.      5/7/2020 Survivorship    Survivorship Care Plan completed and discussed with patient.  Copy of Survivorship Care Plan provided to patient and primary care provider.     12/14/2020 Molecular Testing    CARIS results:  MMR deficient/MSI high--level 1 for pembrolizumab  ER positive 2+, 85%; CA positive 2+, 65%  ARID1A pathogenic variant, PTEN pathogenic variant  BRCA 1/2 negative, PD-L1 negative     12/22/2020 -  Chemotherapy    Dose modification of both carboplatin and paclitaxel following cycle #2 related to weight loss, neuropathy  CT after cycle #4 showed partial response in chest  OP ENDOMETRIAL PACLitaxel /  CARBOplatin (Q21D)           Past Medical History:   Diagnosis Date   • Achalasia, esophageal    • Anxiety and depression    • Arthritis    • Chronic back pain    • Chronic bronchitis (CMS/HCC)    • COPD (chronic obstructive pulmonary disease) (CMS/HCC)     no home O2   • Diabetes (CMS/HCC)    • Endometrial cancer (CMS/HCC) 11/2019    Stage II   • Essential hypertension 7/5/2019   • Frequent urination    • Gall stones    • GERD (gastroesophageal reflux disease)    • Heart murmur    • High blood pressure    • History of brachytherapy 03/11/2020    vagina   • History of prolapse of bladder    • History of radiation therapy 03/03/2020    Pelvis   • Pinched nerve     back   • Spinal stenosis    • Wears dentures    • Wears glasses        Past Surgical History:   Procedure Laterality Date   • COLONOSCOPY  01/2020   • ENDOSCOPY     • EPIDURAL  02/2020   • HELLER MYOTOMY LAPAROSCOPIC WITH ENDOSCOPY  2012   • TOTAL LAPAROSCOPIC HYSTERECTOMY SALPINGO OOPHORECTOMY Bilateral 11/15/2019    Procedure: TYPE 1 RADICAL TOTAL LAPAROSCOPIC HYSTERECTOMY BILATERAL SALPINGOOPHORECTOMY, LYSIS OF ADHESTIONS, BILATERAL PELVIC LYMPH NODE DISSECTION WITH DAVINCI ROBOT;  Surgeon: Zuly Evans MD;  Location: [facility];  Service: DaVinci   • UPPER GASTROINTESTINAL ENDOSCOPY  01/2020       MEDICATIONS: The current medication list was reviewed with the patient and updated in the EMR this date per the Medical Assistant. Medication dosages and frequencies were confirmed to be accurate.      Allergies:  is allergic to augmentin [amoxicillin-pot clavulanate] and biaxin [clarithromycin].    Social History:   Social History     Socioeconomic History   • Marital status:      Spouse name: Not on file   • Number of children: 2   • Years of education: Not on file   • Highest education level: Not on file   Tobacco Use   • Smoking status: Current Every Day Smoker     Packs/day: 1.00     Years: 30.00     Pack years: 30.00     Types: Cigarettes    • Smokeless tobacco: Never Used   Substance and Sexual Activity   • Alcohol use: No   • Drug use: No   • Sexual activity: Defer       Family History:    Family History   Problem Relation Age of Onset   • Heart attack Mother    • Uterine cancer Mother 84   • Lung cancer Brother    • Colon cancer Half-Sister 50   • Colon cancer Paternal Aunt    • Colon cancer Cousin    • Heart attack Brother    • Hypertension Daughter    • Hypertension Son        Health Maintenance:    Health Maintenance   Topic Date Due   • MAMMOGRAM  Never done   • URINE MICROALBUMIN  Never done   • DXA SCAN  Never done   • ANNUAL PHYSICAL  Never done   • COVID-19 Vaccine (1) Never done   • TDAP/TD VACCINES (1 - Tdap) Never done   • Pneumococcal Vaccine 65+ (1 of 1 - PPSV23) Never done   • ZOSTER VACCINE (2 of 3) 07/25/2016   • HEPATITIS C SCREENING  Never done   • DIABETIC FOOT EXAM  Never done   • DIABETIC EYE EXAM  Never done   • HEMOGLOBIN A1C  05/14/2020   • INFLUENZA VACCINE  Never done   • LUNG CANCER SCREENING  03/02/2022   • COLONOSCOPY  01/21/2030   • MENINGOCOCCAL VACCINE  Aged Out       Review of Systems   Constitutional: Positive for appetite change (poor appetite), fatigue and unexpected weight change. Negative for chills and fever.        9lb weight loss since 1/12   HENT: Positive for congestion, postnasal drip and sinus pressure. Negative for ear discharge, ear pain, hearing loss, mouth sores, nosebleeds, rhinorrhea, sinus pain, sore throat, tinnitus and trouble swallowing.    Eyes: Positive for itching and visual disturbance (blurred vision). Negative for pain.   Respiratory: Positive for cough and shortness of breath. Negative for wheezing.    Cardiovascular: Negative for chest pain and palpitations.        Heart murmur   Gastrointestinal: Negative for abdominal pain, blood in stool, constipation, diarrhea, nausea and vomiting.        Heartburn   Endocrine: Negative for heat intolerance (no hot flashes).   Genitourinary:  "Negative for difficulty urinating, flank pain, frequency, hematuria and urgency.        History of urinary and fecal incontinence   Musculoskeletal: Positive for arthralgias (bilateral shoulders), back pain (pinched nerve), gait problem (limping), joint swelling and myalgias.   Skin: Negative for color change, rash and wound.   Allergic/Immunologic: Negative for immunocompromised state.   Neurological: Positive for numbness. Negative for dizziness, seizures, syncope, weakness and headaches.   Hematological: Negative for adenopathy. Bruises/bleeds easily.   Psychiatric/Behavioral: Positive for dysphoric mood. Negative for agitation, confusion and sleep disturbance. The patient is not nervous/anxious.    All other systems reviewed and are negative.      Physical Exam    Vitals:    03/17/21 0907 03/17/21 0910   BP: 177/81 175/93   Pulse: 107 111   Resp: 15    Temp: 96.9 °F (36.1 °C)    TempSrc: Temporal    SpO2: 94%    Weight: 57.6 kg (127 lb)    Height: 162.6 cm (64.02\")    PainSc:   8      Body mass index is 21.79 kg/m².    Wt Readings from Last 3 Encounters:   03/17/21 57.6 kg (127 lb)   02/25/21 59 kg (130 lb)   02/24/21 58.1 kg (128 lb)         GENERAL: Alert, tired-appearing thin female appearing her stated age who is in no apparent distress.   HEENT: Sclera anicteric. Head normocephalic, atraumatic. Mucus membranes moist.   NECK: Trachea midline, supple, without masses.  No thyromegaly.   BREASTS: Deferred  CARDIOVASCULAR: Normal rate, regular rhythm,+ systolic ejection murmur, no rubs, or gallops.  No peripheral edema.  RESPIRATORY: Clear to auscultation bilaterally, normal respiratory effort with occasional cough  BACK:  No CVA tenderness, no vertebral tenderness on palpation  GASTROINTESTINAL:  Abdomen is soft, non-tender, non-distended, no rebound or guarding, no masses, or hernias. No HSM.  SKIN:  Warm, dry, well-perfused.  All visible areas intact.  Ecchymosis of the forearms.  Mild venous stasis bilateral " lower extremities. Left sided port with small area of overlying erythema without fluctuance   PSYCHIATRIC: AO x3, with appropriate affect, normal thought processes.  NEUROLOGIC: No focal deficits.  Moves extremities well.   MUSCULOSKELETAL: Hesitant gait, shifting station. Walks with limp favoring left leg  EXTREMITIES:   No cyanosis, clubbing, symmetric.  LYMPHATICS:  No cervical or inguinal adenopathy noted.     PELVIC exam: Deferred.    ECOG PS 1    PROCEDURES: None      Diagnostic Data:     Lab Results   Component Value Date    WBC 2.70 (L) 03/17/2021    HGB 8.1 (L) 03/17/2021    HCT 24.1 (L) 03/17/2021    MCV 88.5 03/17/2021     (L) 03/17/2021    NEUTROABS 2.20 03/17/2021    GLUCOSE 168 (H) 03/17/2021    BUN 28 (H) 03/17/2021    CREATININE 0.70 03/17/2021    EGFRIFNONA 86 03/17/2021     03/17/2021    K 4.0 03/17/2021     03/17/2021    CO2 26.0 03/17/2021    CALCIUM 9.9 03/17/2021    ALBUMIN 4.00 03/17/2021    AST 12 03/17/2021    ALT 9 03/17/2021    BILITOT <0.2 03/17/2021     No results found for:         Assessment/Plan   This is a 70 y.o. woman with biopsy-proven recurrent endometrial cancer with lung metastasis and mediastinal adenopathy.  Encounter Diagnoses   Name Primary?   • Endometrial cancer (CMS/HCC) Yes   • Malignant neoplasm metastatic to lung, unspecified laterality (CMS/HCC)    • Tobacco abuse    • Chemotherapy-induced fatigue    • Neuropathy    • Cellulitis of chest wall    • Antineoplastic chemotherapy induced anemia    • Dehydration        History of endometrial cancer as detailed above, now with recurrence  - Initial diagnosis in October 2019, s/p Type 1 radical RTLH/BSO in November with radiation tx in 2020.  Recurrence diagnosis in November 2020  - Tumor sent for genetic profiling with CARIS results: MMR deficient/MSI high--level 1 for pembrolizumab, ER positive 2+, 85%; [place] positive 2+, 65%.  ARID1A pathogenic variant, PTEN pathogenic variant, BRCA 1/2 negative,  PD-L1 negative  - Treatment options and side effects discussed and patient elected to proceed with chemotherapy: combined carboplatin at AUC of 6+ paclitaxel at 175 mg/m² IV q. 21 days x 6  - Cycle 1 received 12/22 with subsequent fatigue, mouth sores and poor PO intake requiring IV fluids.  S/p Port-a-cath placement on 1/11.   - Dose modification of both carboplatin and paclitaxel following cycle #2 related to weight loss, neuropathy  - continue with Cycle #5 today, pending labs  - CT imaging from 3/4 shows significant interval treatment response.  - Consider hormone blockade (letrazole, arimidex, etc) post chemo as maintenance     Chemotherapy-induced anemia  -Blood transfusion 2/25/2021 as patient is symptomatic  -Labs prior to chemo today; transfuse if symptomatic    Port site cellulitis  - S/P Bactrim; appears much improved today with no overt signs of infection    Weight loss, dehydration  - Lost 3lb since last visit  - Nexium use to decrease GERD symptoms and increased water intake for dry mouth symptoms  - Discussed smoking cessation to promote appetite multiple times  - Nutrition consult previously placed     Tobacco use  - currently about 1 pack per day  - counseled on cessation as stated above     Diabetes Mellitis  - Managed by Dr. Acosta, currently on Metformin  - Going to bring glucometer to office for MA to show her to use    Spinal Stenosis with left leg pain, bilateral foot neuropathy-patient states neuropathy not related to chemotherapy  - Managed by Dragan Hou in Ortho with cortisone injections. Next injection next Friday; will get tay labs prior to injection appointment (3/25/2021) to make sure not TCP or neutropenic  - Difficulty walking at today's visit with significant limp.  - Dose reduced paclitaxel following Cycle #2.  - Counseled on Gabapentin use more than once per day for pain     Pain assessment was performed today as a part of patient’s care.  For patients with pain related to  surgery, gynecologic malignancy or cancer treatment, the plan is as noted in the assessment/plan.  For patients with pain not related to these issues, they are to seek any further needed care from a more appropriate provider, such as PCP.    Orders Placed This Encounter   Procedures   • Comprehensive metabolic panel     Standing Status:   Future     Number of Occurrences:   1     Standing Expiration Date:   3/17/2022   • CBC and Differential     Standing Status:   Future     Number of Occurrences:   1     Standing Expiration Date:   3/17/2022     Order Specific Question:   Manual Differential     Answer:   No   • CBC & Differential     Standing Status:   Future     Standing Expiration Date:   3/17/2022     Order Specific Question:   Manual Differential     Answer:   No     FOLLOW UP: 3 weeks    Patient was seen and examined with Dr. Burgess,  resident, who performed portions of the examination and documentation for this patient's care under my direct supervision.  I agree with the above documentation and plan.    Zuly Evans MD  03/17/21  09:46 EDT

## 2021-03-17 NOTE — TELEPHONE ENCOUNTER
At checkout, patient informed Dr. Evans that she would be having a spinal injection on Friday 3/26/21. Dr. Evans informed her that she would need to get labs drawn the day prior to make sure lab levels were OK. I phoned [facility] and spoke with Ranjan to inform them that patient would have labs drawn on 3/25/21 at  930. Labs will be sent to them. They noted in patients appointment that they have to review labs prior to injection.

## 2021-03-17 NOTE — PROGRESS NOTES
Onc Nutrition    Patient: Ele Strickland  YOB: 1950     Cancer Dx: recurrent endometrial cancer with new lung metastasis   Surgery: Hysterectomy, bilateral salpingo-oophorectomy and lymph node dissection (11/15/19)  Chemotherapy: Carbo / Taxol - every 21 days (cycle 5/6 today)  Radiation: 45 Gray to the pelvis and 12 Gy in 2 fractions utilizing iridium 192 and a vaginal cylinder to boost the apex of the vagina (1/24/20 - 3/11/20)    Weight - 127# / fairly stable since starting chemo - fluctuating between 125-134#  Weight Change - ~40# (23.5%) weight loss over the past ~ 5 months (prior to starting chemo)     Follow up with patient during her infusion appointment.  Patient continues to complain of taste changes, stating food just doesn't taste right.  She reports drinking 2 ONS daily (either Glucerna or Boost).  She denies new nutritional complaints at this time.    Strongly encouraged for her to be doing good oral care regularly.  Advised her to brush her teeth, gums, tongue at least twice daily.  Recommended she use the baking soda and salt mouth rinse before she eats and throughout the day to aid with taste changes and oral care.  Offered other tips to aid with taste changes such as using plastic utensils to aid with metallic taste and adding sweeteners to foods to aid with salty/bitter taste.  Also advised her to continue drinking at least 2 ONS daily to aid with calorie/protein intake and weight maintenance/gain.  She denied the need for coupons at this time.  Also encouraged her to be choosing high calorie/protein foods and to be drinking hydrating fluids throughout the day.    Answered her questions and she voiced understanding of information discussed.  Encouraged to call RD with questions.  Will monitor as needed during her treatment course.    Kalani Adame, DOROTHY  03/17/21

## 2021-03-25 NOTE — PROGRESS NOTES
Patient was identified by name and .    She was educated on use of Glucometer and was able to teach back.     She was given handout information and instructed to call if she needs additional information.

## 2021-04-06 NOTE — PROGRESS NOTES
Ele Strickland  [id number]  1950      Reason for visit: History of endometrial cancer, now with biopsy-proven lung metastasis  Original referring provider:  Kaylen Taylor DO     History of present illness:  The patient is a 71 y.o. year old female who presents today for treatment and evaluation of the above issues.    In review: Patient underwent definitive radiation for her stage II grade 2 endometrioid adenocarcinoma.  Patient was seen on 2020 by Dr. Yogi Boyd due to complaints of hemoptysis.  She had undergone chest x-ray as a part of her preop evaluation in 2019 without lesions noted.  She had a subsequent CT scan which showed likely inflammatory changes.  She has longstanding history of tobacco abuse.  She underwent CT scan 10/30/2020 and imaging of the abdomen and pelvis was essentially negative for metastatic disease.  However, CT scan of the chest showed multiple lesions and hilar adenopathy.  Patient underwent CT-guided biopsy of lung lesion and pathology showed recurrent endometrial cancer.    She elected to undergo chemotherapy and is now s/p cycle 5 of dose reduced Carbo/Taxol.  She had a CT chest on 3/4/2021 which showed a treatment response.  Today, she reports not feeling well due to fatigue, back pain, and chest congestion. Has felt this way since most recent transfusion of 2U pRBC 3/25 for hct 20.6%. However, does feel well enough for chemotherapy today. Smoking 1/2-1 ppd. Denies cough or hemoptysis.  She is due for Cycle #6 of chemo today.     OBGYN History:  She is a .  She does not currently use HRT, but was briefly on progesterone around the time that she went through menopause. She does not have a history of abnormal pap smears.    Oncologic History:  Oncology/Hematology History   Endometrial cancer (CMS/HCC)   10/23/2019 Initial Diagnosis    Patient referred to Gyn Oncology due to postmenopausal bleeding x 10 months and pap smear showing atypical glandular cells with  negative HPV.   Endometrial biopsy obtained. Pathology revealed grade 2 endometrioid adenocarcinoma.      11/11/2019 Imaging    Pre-op CT abdomen pelvis showed lobular hypoenhancing mass in the uterus and the cervix, presumably known neoplasm. Incidental right lobe liver hemangioma noted, unchanged from previous imaging. No evidence of metastatic disease.      11/15/2019 Surgery    Type 1 radical RTLH/BSO, lysis of adhesions, and bilateral pelvic lymph node dissection.     Final pathology showed 9.5 x 6.0 x 2.5 cm grade 2 EAC with focal mucinous and secretory features, invasive into >90% of the myometrium. Tumor invades cervical stroma. No lymphvascular invasion and nodes negative.  MSI by IHC showed absent MLH1 and PMS2, reflex hypermethylation positive = sporadic tumor.  Stage II grade 2     1/24/2020 - 3/11/2020 Radiation    Adjuvant external beam radiation plus vaginal brachytherapy.  The pelvis received a dose of 45 Gy in 25 fractions, followed by a boost to the apex of the vagina with 12 Gy in 2 fractions.  Treatments tolerated well.      5/7/2020 Survivorship    Survivorship Care Plan completed and discussed with patient.  Copy of Survivorship Care Plan provided to patient and primary care provider.     12/14/2020 Molecular Testing    CARIS results:  MMR deficient/MSI high--level 1 for pembrolizumab  ER positive 2+, 85%; AR positive 2+, 65%  ARID1A pathogenic variant, PTEN pathogenic variant  BRCA 1/2 negative, PD-L1 negative     12/22/2020 -  Chemotherapy    Dose modification of both carboplatin and paclitaxel following cycle #2 related to weight loss, neuropathy  CT after cycle #4 showed partial response in chest  OP ENDOMETRIAL PACLitaxel / CARBOplatin (Q21D)           Past Medical History:   Diagnosis Date   • Achalasia, esophageal    • Anxiety and depression    • Arthritis    • Chronic back pain    • Chronic bronchitis (CMS/HCC)    • COPD (chronic obstructive pulmonary disease) (CMS/HCC)     no home O2    • Diabetes (CMS/[facility])    • Endometrial cancer (CMS/[facility]) 11/2019    Stage II   • Essential hypertension 7/5/2019   • Frequent urination    • Gall stones    • GERD (gastroesophageal reflux disease)    • Heart murmur    • High blood pressure    • History of brachytherapy 03/11/2020    vagina   • History of prolapse of bladder    • History of radiation therapy 03/03/2020    Pelvis   • Pinched nerve     back   • Spinal stenosis    • Wears dentures    • Wears glasses        Past Surgical History:   Procedure Laterality Date   • COLONOSCOPY  01/2020   • ENDOSCOPY     • EPIDURAL  02/2020   • HELLER MYOTOMY LAPAROSCOPIC WITH ENDOSCOPY  2012   • TOTAL LAPAROSCOPIC HYSTERECTOMY SALPINGO OOPHORECTOMY Bilateral 11/15/2019    Procedure: TYPE 1 RADICAL TOTAL LAPAROSCOPIC HYSTERECTOMY BILATERAL SALPINGOOPHORECTOMY, LYSIS OF ADHESTIONS, BILATERAL PELVIC LYMPH NODE DISSECTION WITH DAVINCI ROBOT;  Surgeon: Zuly Evans MD;  Location: [facility];  Service: DaVinci   • UPPER GASTROINTESTINAL ENDOSCOPY  01/2020       MEDICATIONS: The current medication list was reviewed with the patient and updated in the EMR this date per the Medical Assistant. Medication dosages and frequencies were confirmed to be accurate.      Allergies:  is allergic to augmentin [amoxicillin-pot clavulanate] and biaxin [clarithromycin].    Social History:   Social History     Socioeconomic History   • Marital status:      Spouse name: Not on file   • Number of children: 2   • Years of education: Not on file   • Highest education level: Not on file   Tobacco Use   • Smoking status: Current Every Day Smoker     Packs/day: 1.00     Years: 30.00     Pack years: 30.00     Types: Cigarettes   • Smokeless tobacco: Never Used   Vaping Use   • Vaping Use: Former   • Substances: Nicotine, Flavoring   • Devices: Pre-filled or refillable cartridge, Pre-filled pod   Substance and Sexual Activity   • Alcohol use: No   • Drug use: No   • Sexual activity: Defer        Family History:    Family History   Problem Relation Age of Onset   • Heart attack Mother    • Uterine cancer Mother 84   • Lung cancer Brother    • Colon cancer Half-Sister 50   • Colon cancer Paternal Aunt    • Colon cancer Cousin    • Heart attack Brother    • Hypertension Daughter    • Hypertension Son        Health Maintenance:    Health Maintenance   Topic Date Due   • MAMMOGRAM  Never done   • URINE MICROALBUMIN  Never done   • DXA SCAN  Never done   • ANNUAL PHYSICAL  Never done   • COVID-19 Vaccine (1) Never done   • TDAP/TD VACCINES (1 - Tdap) Never done   • Pneumococcal Vaccine 65+ (1 of 1 - PPSV23) Never done   • ZOSTER VACCINE (2 of 3) 07/25/2016   • HEPATITIS C SCREENING  Never done   • DIABETIC FOOT EXAM  Never done   • DIABETIC EYE EXAM  Never done   • HEMOGLOBIN A1C  05/14/2020   • INFLUENZA VACCINE  08/01/2021   • LUNG CANCER SCREENING  03/02/2022   • COLONOSCOPY  01/21/2030   • MENINGOCOCCAL VACCINE  Aged Out       Review of Systems   Constitutional: Positive for appetite change (poor appetite), fatigue and unexpected weight change. Negative for chills and fever.          HENT: Positive for congestion, postnasal drip. Negative for ear discharge, ear pain, hearing loss, mouth sores, nosebleeds, rhinorrhea, sinus pain, sore throat, tinnitus and trouble swallowing.    Eyes: Positive for itching and visual disturbance (blurred vision). Negative for pain.   Respiratory: Positive for cough and shortness of breath. Negative for wheezing.    Cardiovascular: Negative for chest pain and palpitations.   Gastrointestinal: Negative for abdominal pain, blood in stool, constipation, diarrhea, nausea and vomiting.        Heartburn   Endocrine: Negative for heat intolerance (no hot flashes).   Genitourinary: Negative for difficulty urinating, flank pain, frequency, hematuria and urgency.        History of urinary and fecal incontinence   Musculoskeletal: Positive for arthralgias (bilateral shoulders), back  "pain (pinched nerve), gait problem (limping), joint swelling and myalgias.   Skin: Negative for color change, rash and wound.   Allergic/Immunologic: Negative for immunocompromised state.   Neurological: Positive for numbness. Negative for dizziness, seizures, syncope, weakness and headaches.   Hematological: Negative for adenopathy. Bruises/bleeds easily.   Psychiatric/Behavioral: Positive for dysphoric mood. Negative for agitation, confusion and sleep disturbance. The patient is not nervous/anxious.    All other systems reviewed and are negative.    Physical Exam    Vitals:    04/07/21 0924   BP: 168/77  Comment: LUE   Pulse: 91   Resp: 20   Temp: 96 °F (35.6 °C)   TempSrc: Infrared   SpO2: 98%  Comment: RA   Weight: 55.8 kg (123 lb)   Height: 162.6 cm (64\")   PainSc:   7   PainLoc: Back  Comment: Pinched nerve     Body mass index is 21.11 kg/m².    Wt Readings from Last 3 Encounters:   04/07/21 55.8 kg (123 lb)   03/25/21 56.7 kg (125 lb)   03/17/21 57.6 kg (127 lb)     GENERAL: Alert, tired-appearing thin female appearing her stated age who is in no apparent distress.   HEENT: Sclera anicteric. Head normocephalic, atraumatic. Mucus membranes moist.   NECK: Trachea midline, supple, without masses.   CARDIOVASCULAR: Normal rate, regular rhythm,+ systolic ejection murmur, no rubs, or gallops.  No peripheral edema.  RESPIRATORY: Clear to auscultation bilaterally, normal respiratory effort with occasional cough  BACK:  No CVA tenderness, no vertebral tenderness on palpation  GASTROINTESTINAL:  Abdomen is soft, non-tender, non-distended, no rebound or guarding, no masses, or hernias. No HSM.  SKIN:  Warm, dry, well-perfused.  All visible areas intact.  Ecchymosis of the forearms.  Mild venous stasis bilateral lower extremities. Left sided port normal in appearance..  PSYCHIATRIC: AO x3, with appropriate affect, normal thought processes.  NEUROLOGIC: No focal deficits.  Moves extremities well.   MUSCULOSKELETAL: " Hesitant gait, shifting station. Walks with limp favoring left leg  EXTREMITIES:   No cyanosis, clubbing, symmetric.  LYMPHATICS:  No cervical or inguinal adenopathy noted.     PELVIC exam: Atrophic but otherwise normal external genitalia. No nodules, masses, lesions within vagina or vaginal bleeding. Cuff intact.    ECOG PS 1    PROCEDURES: None      Diagnostic Data:     Lab Results   Component Value Date    WBC 4.90 04/07/2021    HGB 9.8 (L) 04/07/2021    HCT 30.1 (L) 04/07/2021    MCV 91.0 04/07/2021     (L) 04/07/2021    NEUTROABS 4.20 04/07/2021    GLUCOSE 148 (H) 04/07/2021    BUN 28 (H) 04/07/2021    CREATININE 0.90 04/07/2021    EGFRIFNONA 52 (L) 04/07/2021     04/07/2021    K 4.0 04/07/2021     04/07/2021    CO2 22.0 04/07/2021    CALCIUM 9.2 04/07/2021    ALBUMIN 3.90 04/07/2021    AST 17 04/07/2021    ALT 10 04/07/2021    BILITOT 0.2 04/07/2021     No results found for:         Assessment/Plan   This is a 71 y.o. woman with biopsy-proven recurrent endometrial cancer with lung metastasis and mediastinal adenopathy.  Encounter Diagnoses   Name Primary?   • Endometrial cancer (CMS/HCC) Yes   • Chemotherapy-induced fatigue    • Malignant neoplasm metastatic to lung, unspecified laterality (CMS/HCC)    • Neuropathy    • Weight loss    • Dehydration    • Antineoplastic chemotherapy induced anemia        History of endometrial cancer as detailed above, now with recurrence  - Initial diagnosis in October 2019, s/p Type 1 radical RTLH/BSO in November with radiation tx in 2020.  Recurrence diagnosis in November 2020  - Tumor sent for genetic profiling with CARIS results: MMR deficient/MSI high--level 1 for pembrolizumab, ER positive 2+, 85%; PA positive 2+, 65%.  ARID1A pathogenic variant, PTEN pathogenic variant, BRCA 1/2 negative, PD-L1 negative  - Treatment options and side effects discussed and patient elected to proceed with chemotherapy: combined carboplatin at AUC of 6+ paclitaxel at  175 mg/m² IV q. 21 days x 6  - Cycle 1 received 12/22 with subsequent fatigue, mouth sores and poor PO intake requiring IV fluids.  S/p Port-a-cath placement on 1/11.   - Dose modification of both carboplatin and paclitaxel following cycle #2 related to weight loss, neuropathy  - Continue with further dose reduced Cycle #6 today, pending labs  - Plan for CT C/A/P in 1 month -posttreatment imaging for new baseline.  - Plan to start hormone blockade with Arimidex in 1 month as maintenance; pharmacy to see patient today while in chemo suite to provide education.  Patient would also be a candidate for single agent pembrolizumab should she have a symptomatic recurrence.  However, I would not treat her with this at this point as she may have ongoing response with hormone blockade and could do well on this anywhere from months to years.  She verbalized understanding of this discussion.    Chemotherapy-induced anemia  - Prior blood transfusion 2/25/2021   - Labs prior to chemo 3/25 were significant for hct 20.6% and patient received 2U pRBC  - Dose reduce further carboplatin cycle 6 today as patient has needed transfusions with the two prior cycles    Port site cellulitis  - S/P Bactrim; completely resolved    Weight loss, dehydration  -Encourage p.o., nutrition consult previously placed    Tobacco use  - currently 1/2-1 ppd  - counseled on cessation as stated above     Diabetes Mellitis  - Managed by Dr. Acosta, currently on Metformin    Covid vaccination  -Patient hesitant to proceed with Covid vaccination.  This was extensively discussed and she was educated regarding her risk factors, short-term risk of serious issues related to Covid infection, death related to Covid infection, and unknown long-term sequela of infection.  She promised she would consider this further.    Spinal Stenosis with left leg pain, bilateral foot neuropathy-patient states neuropathy not related to chemotherapy  - Managed by Dragan Hou in Ortho  with cortisone injections.   - Difficulty walking at today's visit with significant limp.  - Dose reduced paclitaxel following Cycle #2.  - Counseled on Gabapentin use more than once per day for pain     Pain assessment was performed today as a part of patient’s care.  For patients with pain related to surgery, gynecologic malignancy or cancer treatment, the plan is as noted in the assessment/plan.  For patients with pain not related to these issues, they are to seek any further needed care from a more appropriate provider, such as PCP.       Orders Placed This Encounter   Procedures   • CT Abdomen Pelvis With Contrast     Standing Status:   Future     Standing Expiration Date:   4/7/2022     Order Specific Question:   Will Oral Contrast be needed for this procedure?     Answer:   Yes     Order Specific Question:   Release to patient     Answer:   Immediate   • CT Chest With Contrast Diagnostic     Standing Status:   Future     Standing Expiration Date:   4/7/2022   • Comprehensive metabolic panel     Standing Status:   Future     Number of Occurrences:   1     Standing Expiration Date:   4/7/2022   • CBC and Differential     Standing Status:   Future     Number of Occurrences:   1     Standing Expiration Date:   4/7/2022     Order Specific Question:   Manual Differential     Answer:   No     FOLLOW UP: 3 weeks    Patient was seen and examined with Dr. Orantes,  resident, who performed portions of the examination and documentation for this patient's care under my direct supervision.  I agree with the above documentation and plan.    Zuly Evans MD  04/07/21  15:34 EDT

## 2021-04-07 NOTE — PROGRESS NOTES
Onc Nutrition    Patient: Ele Strickland  YOB: 1950    Cancer Dx: recurrent endometrial cancer with new lung metastasis   Surgery: Hysterectomy, bilateral salpingo-oophorectomy and lymph node dissection (11/15/19)  Chemotherapy: Carbo / Taxol - every 21 days (cycle 6/6 today)  Radiation: 45 Gray to the pelvis and 12 Gy in 2 fractions utilizing iridium 192 and a vaginal cylinder to boost the apex of the vagina (1/24/20 - 3/11/20)     Weight - 123# / 4# weight loss x 3 weeks - fluctuating between 123-134# since start of chemo  Weight Change - ~40# (23.5%) weight loss over the past ~ 5 months (prior to starting chemo)    Follow up with patient during her infusion appointment.  She continues to complain of taste changes and decreased appetite.  Note patient continues to smoke but states she is smoking less.  She reports she continues to drink Boost Plus and Glucerna daily.      Encouraged her to continue drinking ONS daily to aid with calorie and protein intake.  She denies the need for coupons at this time.  Advised her to be eating smaller more frequent snacks throughout the day focusing on high protein/calorie foods and suggestions offered.  Again strongly recommended good oral care including baking soda / salt mouth rinse before / after eating and after smoking.  She states her glucometer is now working and her blood sugars have been pretty good (138 gm/dl) last time she checked.  Encouraged her to follow up with her primary care physician.    Answered her questions and she voiced understanding of information discussed.  Encouraged to call RD with questions.  Will follow up as indicated.    Kalani Adame RD  04/07/21

## 2021-04-07 NOTE — ADDENDUM NOTE
Encounter addended by: Yahaira Velásquez RN on: 4/7/2021 3:27 PM   Actions taken: Flowsheet accepted

## 2021-04-07 NOTE — PLAN OF CARE
Oral Chemotherapy Teaching      Patient Name/:  Ele Strickland   1950  Oral Chemotherapy Regimen:  Anastrozole PO       Additional Notes:  I spoke with the patient today about Arimidex. I explained that she won't be starting this medication until May and she expressed understanding. I went over how to take this medication (once a day, with or without food), what to do if she forgets a dose, proper handling (caregivers wearing gloves) of the medication and body fluids, and how to store this medicine. I went over potential side effects (hot flashes, joint/muscle pain, lower bone density) and ways to manage them. I told her to call us if she starts new medications or supplements to make sure that they don't interact with the medication. The patient had no questions or concerns.    Addie Martinez  PharmD Candidate

## 2021-04-13 NOTE — TELEPHONE ENCOUNTER
Been feeling  awful, usually after treatment by Saturday she is feeling good.     She is SOB, weak, has a weird feeling in her throat.  Patient has not been able to eat or drink well since Saturday.    I have spoken with MD . She wants her to come in and have labs and one liter of fluids today. A type and screen because she may need blood tomorrow.     MD/APRN will follow up with blood work and instructions on what patient should do if labs are normal or abnormal.       I had the patient on hold and let her know that I called infusion and she can come in today.

## 2021-04-17 PROBLEM — D69.6 THROMBOCYTOPENIA (HCC): Status: ACTIVE | Noted: 2021-01-01

## 2021-04-17 PROBLEM — R06.02 SHORTNESS OF BREATH: Status: ACTIVE | Noted: 2021-01-01

## 2021-04-17 NOTE — PROGRESS NOTES
Patient called to say she is short of breath and having chest pain.  Chemo for endometrial cancer recurrent to lung just completed and complicated by BM suppression.  Pt sounds very hoarse.  Advised she go to ER - comorbidity includes long time tobacco abuse.  Patient V/U - stated she may have problems getting to ER.  Advised ambulance if she can't get there any other way.  Not covid vaccinated- suspicious of vaccine, has been working.

## 2021-04-18 NOTE — PLAN OF CARE
Problem: Adult Inpatient Plan of Care  Goal: Plan of Care Review  Plan of Care Reviewed With: patient  Progress: improving  Outcome Summary: PT eval completed at bed level due to medical status.  Patient presents w/ decreased strength, decreased functional endurance, and functional decline from baseline.  Pt. able to perform bed mobility independently w/ increased time/effort; transfers and ambulation deferred d/t low Hgb/Hct.  Skilled IPPT indicated to improve pt's safety and independence w/ mobility.  Recommend home w/ assist and HHPT at D/C, pending progress.

## 2021-04-18 NOTE — PLAN OF CARE
Goal Outcome Evaluation:  Plan of Care Reviewed With: patient  Progress: improving  Outcome Summary: PT eval completed at bed level due to medical status.  Patient presents w/ decreased strength, decreased functional endurance, and functional decline from baseline.  Pt. able to perform bed mobility independently w/ increased time/effort; transfers and ambulation deferred d/t low Hgb/Hct.  Skilled IPPT indicated to improve pt's safety and independence w/ mobility.  Recommend home w/ assist and HHPT at D/C, pending progress.

## 2021-04-18 NOTE — PLAN OF CARE
Goal Outcome Evaluation:  Plan of Care Reviewed With: patient        SLP dysphagia evaluation completed. Pt reports hx esophageal achalasia that was addressed in 2012 though now returning/worsening. Not current regurgitation, reports sticking/retention. Pt prefers to address and manage as OP with likely repeat EGD. No further SLP needs. Will sign-off. Please see note for further details and recommendations.

## 2021-04-18 NOTE — PLAN OF CARE
Goal Outcome Evaluation:  Plan of Care Reviewed With: patient     Outcome Summary: OT initial eval and brief chart review completed. Pt presents with multiple comorbidities and decreased independence with ADL's and mobility. Pt may benefit from HH at d/c, but will likely need assist at home as well.

## 2021-04-18 NOTE — CONSULTS
Ele Strickland  [id number]  1950      Reason for consultation: History of endometrial cancer, now with biopsy-proven lung metastasis, hospital admission for pancytopenia, weakness, dyspnea with chest pain, dehydration  Original referring provider:  Kaylen Taylor DO     History of present illness:  The patient is a 71 y.o. year old female who was admitted to the hospital 2021 after emergency room visit.  She had called me and was complaining of chest pain and shortness of breath.  On presentation, she is noted to have significant thrombocytopenia with platelet counts of 11.  She was admitted and underwent transfusion.  She has had a good initial response and her platelets although worsening anemia related to chemotherapy and correction of dehydration.  Today, patient notes she hadn't felt well for a couple of weeks. Notes bump on head last week.  Denies fall.  Thinks it happened in her sleep.  Nausea better with antiemetics.  Last BM 2-3 days ago.  Normal for her.  No melena/BRBPR.  Was SOB and having right sided CP - better now.  Some reflux.      OBGYN History:  She is a .  She does not currently use HRT, but was briefly on progesterone around the time that she went through menopause. She does not have a history of abnormal pap smears.    Oncologic History:  Oncology/Hematology History   Endometrial cancer (CMS/HCC)   10/23/2019 Initial Diagnosis    Patient referred to Gyn Oncology due to postmenopausal bleeding x 10 months and pap smear showing atypical glandular cells with negative HPV.   Endometrial biopsy obtained. Pathology revealed grade 2 endometrioid adenocarcinoma.      2019 Imaging    Pre-op CT abdomen pelvis showed lobular hypoenhancing mass in the uterus and the cervix, presumably known neoplasm. Incidental right lobe liver hemangioma noted, unchanged from previous imaging. No evidence of metastatic disease.      11/15/2019 Surgery    Type 1 radical RTLH/BSO, lysis of adhesions, and  bilateral pelvic lymph node dissection.     Final pathology showed 9.5 x 6.0 x 2.5 cm grade 2 EAC with focal mucinous and secretory features, invasive into >90% of the myometrium. Tumor invades cervical stroma. No lymphvascular invasion and nodes negative.  MSI by IHC showed absent MLH1 and PMS2, reflex hypermethylation positive = sporadic tumor.  Stage II grade 2     1/24/2020 - 3/11/2020 Radiation    Adjuvant external beam radiation plus vaginal brachytherapy.  The pelvis received a dose of 45 Gy in 25 fractions, followed by a boost to the apex of the vagina with 12 Gy in 2 fractions.  Treatments tolerated well.      5/7/2020 Survivorship    Survivorship Care Plan completed and discussed with patient.  Copy of Survivorship Care Plan provided to patient and primary care provider.     12/14/2020 Molecular Testing    CARIS results:  MMR deficient/MSI high--level 1 for pembrolizumab  ER positive 2+, 85%; NJ positive 2+, 65%  ARID1A pathogenic variant, PTEN pathogenic variant  BRCA 1/2 negative, PD-L1 negative     12/22/2020 -  Chemotherapy    Dose modification of both carboplatin and paclitaxel following cycle #2 related to weight loss, neuropathy  CT after cycle #4 showed partial response in chest  OP ENDOMETRIAL PACLitaxel / CARBOplatin (Q21D)           Past Medical History:   Diagnosis Date   • Achalasia, esophageal    • Anxiety and depression    • Arthritis    • Chronic back pain    • Chronic bronchitis (CMS/HCC)    • COPD (chronic obstructive pulmonary disease) (CMS/HCC)     no home O2   • Diabetes (CMS/HCC)    • Endometrial cancer (CMS/HCC) 11/2019    Stage II   • Essential hypertension 7/5/2019   • Frequent urination    • Gall stones    • GERD (gastroesophageal reflux disease)    • Heart murmur    • High blood pressure    • History of brachytherapy 03/11/2020    vagina   • History of prolapse of bladder    • History of radiation therapy 03/03/2020    Pelvis   • Pinched nerve     back   • Spinal stenosis    •  Wears dentures    • Wears glasses        Past Surgical History:   Procedure Laterality Date   • COLONOSCOPY  01/2020   • ENDOSCOPY     • EPIDURAL  02/2020   • HELLER MYOTOMY LAPAROSCOPIC WITH ENDOSCOPY  2012   • TOTAL LAPAROSCOPIC HYSTERECTOMY SALPINGO OOPHORECTOMY Bilateral 11/15/2019    Procedure: TYPE 1 RADICAL TOTAL LAPAROSCOPIC HYSTERECTOMY BILATERAL SALPINGOOPHORECTOMY, LYSIS OF ADHESTIONS, BILATERAL PELVIC LYMPH NODE DISSECTION WITH DAVINCI ROBOT;  Surgeon: Zuly Evans MD;  Location: [facility] [place];  Service: DaVinci   • UPPER GASTROINTESTINAL ENDOSCOPY  01/2020       MEDICATIONS: Hospital medication list reviewed    Allergies:  is allergic to augmentin [amoxicillin-pot clavulanate] and biaxin [clarithromycin].    Social History:   Social History     Socioeconomic History   • Marital status:      Spouse name: Not on file   • Number of children: 2   • Years of education: Not on file   • Highest education level: Not on file   Tobacco Use   • Smoking status: Current Every Day Smoker     Packs/day: 1.00     Years: 30.00     Pack years: 30.00     Types: Cigarettes   • Smokeless tobacco: Never Used   Vaping Use   • Vaping Use: Former   • Substances: Nicotine, Flavoring   • Devices: Pre-filled or refillable cartridge, Pre-filled pod   Substance and Sexual Activity   • Alcohol use: No   • Drug use: No   • Sexual activity: Defer       Family History:    Family History   Problem Relation Age of Onset   • Heart attack Mother    • Uterine cancer Mother 84   • Lung cancer Brother    • Colon cancer Half-Sister 50   • Colon cancer Paternal Aunt    • Colon cancer Cousin    • Heart attack Brother    • Hypertension Daughter    • Hypertension Son        Health Maintenance:    Health Maintenance   Topic Date Due   • MAMMOGRAM  Never done   • URINE MICROALBUMIN  Never done   • DXA SCAN  Never done   • ANNUAL PHYSICAL  Never done   • COVID-19 Vaccine (1) Never done   • TDAP/TD VACCINES (1 - Tdap) Never done   •  Pneumococcal Vaccine 65+ (1 of 1 - PPSV23) Never done   • ZOSTER VACCINE (2 of 3) 07/25/2016   • HEPATITIS C SCREENING  Never done   • DIABETIC FOOT EXAM  Never done   • DIABETIC EYE EXAM  Never done   • HEMOGLOBIN A1C  05/14/2020   • INFLUENZA VACCINE  08/01/2021   • LUNG CANCER SCREENING  04/17/2022   • COLONOSCOPY  01/21/2030       Review of Systems   Constitutional: Positive for appetite change (poor appetite), fatigue. Negative for chills and fever.          HENT: Positive for congestion, postnasal drip. Negative for ear discharge, ear pain, hearing loss, mouth sores, nosebleeds, rhinorrhea, sinus pain, sore throat, tinnitus and trouble swallowing.    Eyes: Positive for itching and visual disturbance (blurred vision). Negative for pain.   Respiratory: Positive for cough and shortness of breath. Negative for wheezing.    Cardiovascular: Positive for chest pain on admission, resolved now  Gastrointestinal: Negative for abdominal pain, blood in stool, constipation, diarrhea, nausea and vomiting.        Heartburn   Endocrine: Negative for heat intolerance   Genitourinary: Negative for difficulty urinating, flank pain, frequency, hematuria and urgency.        History of urinary and fecal incontinence   Musculoskeletal: Positive for arthralgias (bilateral shoulders), back pain (pinched nerve), gait problem (limping), joint swelling and myalgias.   Skin: Negative for color change, rash and wound.   Allergic/Immunologic: Negative for immunocompromised state.   Neurological: Positive for numbness. Negative for dizziness, seizures, syncope, weakness and headaches.   Hematological: Negative for adenopathy. Bruises/bleeds easily.   Psychiatric/Behavioral: Positive for dysphoric mood. Negative for agitation, confusion and sleep disturbance. The patient is not nervous/anxious.    All other systems reviewed and are negative.    Physical Exam    Vitals:    04/18/21 0946 04/18/21 1006 04/18/21 1018 04/18/21 1119   BP: 106/54  113/53 108/53 110/62   BP Location:    Left arm   Patient Position:    Lying   Pulse:  78  82   Resp: 16 16 18 18   Temp: 97.5 °F (36.4 °C) 98.5 °F (36.9 °C) 98.1 °F (36.7 °C) 97.9 °F (36.6 °C)   TempSrc: Oral Oral Oral Oral   SpO2:       Weight:       Height:         Body mass index is 20.43 kg/m².    Wt Readings from Last 3 Encounters:   04/18/21 54 kg (119 lb)   04/14/21 54.4 kg (120 lb)   04/13/21 53.5 kg (118 lb)     GENERAL: Alert, thin female appearing her stated age who is in no apparent distress.   HEENT: Sclera anicteric. Head normocephalic, atraumatic. Mucus membranes moist.   NECK: Trachea midline, supple, without masses.   CARDIOVASCULAR: Normal rate, regular rhythm,+ systolic ejection murmur, no rubs, or gallops.  No peripheral edema.  RESPIRATORY: Clear to auscultation bilaterally, normal respiratory effort with occasional cough  BACK:  No CVA tenderness, no vertebral tenderness on palpation  GASTROINTESTINAL:  Abdomen is soft, non-tender, non-distended, no rebound or guarding, no masses, or hernias. No HSM.  SKIN:  Warm, dry, well-perfused.  All visible areas intact.  Ecchymosis left scalp.  Left sided port.  PSYCHIATRIC: AO x3, with appropriate affect, normal thought processes.  NEUROLOGIC: No focal deficits.   MUSCULOSKELETAL: Examined while in bed.  EXTREMITIES:   No cyanosis, clubbing, symmetric.  LYMPHATICS:  No cervical or inguinal adenopathy noted.  Mild supraclavicular fullness on the left when compared to right.     PELVIC exam: Deferred    ECOG PS 2    PROCEDURES: None      Diagnostic Data:   XR Chest 1 View    Result Date: 4/18/2021  Interval decrease seen in size of the masslike densities within the lung fields bilaterally, left greater than right. Portacatheter identified on the left with tip in the SVC. No superimposed infectious process present.  DICTATED:   04/17/2021 EDITED/ls :   04/18/2021  This report was finalized on 4/18/2021 11:53 AM by Dr. Kalani Laurent MD.      CT  Angiogram Chest    Result Date: 4/17/2021  There is marked progression of metastatic disease in the chest since the previous examination. The segmental pulmonary artery to the right middle lobe is encased by tumor and narrowed as a result. This appears similar to the previous examination. No evidence of pulmonary embolism. Signer Name: Austin Sheets MD  Signed: 4/17/2021 8:30 PM  Workstation Name: [id number]  [facility]        Lab Results   Component Value Date    WBC 1.77 (C) 04/18/2021    HGB 6.1 (C) 04/18/2021    HCT 19.3 (C) 04/18/2021    MCV 93.2 04/18/2021     (L) 04/18/2021    NEUTROABS 1.06 (L) 04/18/2021    GLUCOSE 77 04/18/2021    BUN 37 (H) 04/18/2021    CREATININE 0.65 04/18/2021    EGFRIFNONA 90 04/18/2021     04/18/2021    K 3.4 (L) 04/18/2021     04/18/2021    CO2 27.0 04/18/2021    MG 1.5 (L) 04/18/2021    PHOS 3.0 04/18/2021    CALCIUM 8.8 04/18/2021    ALBUMIN 3.20 (L) 04/18/2021    AST 17 04/18/2021    ALT 12 04/18/2021    BILITOT 0.3 04/18/2021     No results found for:         Assessment/Plan   This is a 71 y.o. woman with biopsy-proven recurrent endometrial cancer with lung metastasis and mediastinal adenopathy now admitted for pancytopenia, dyspnea with chest pain    History of endometrial cancer as detailed above, now hospitalized with pancytopenia, significant progression on chest despite initial response to chemotherapy  Agree with transfusion support for thrombocytopenia and anemia with continued hospitalization until counts stabilize, symptoms improved.  Patient's chemotherapy is complete.  We discussed the possibility of Keytruda versus Keytruda plus lenvatinib for ongoing treatment.  At this point, patient does not want to undergo palliative endeavors.  I think it is reasonable given her overall performance status that she resumes treatment watch this acute illness has improved.  Prior to this episode, she had been working.  Will not  pursue Arimidex at this point.  -Status post platelet and blood transfusion.  No G-CSF indicated at this point.  We discussed the importance of fall precautions with significant thrombocytopenia.    Dyspnea with chest pain  Covid negative  Symptoms improving with supportive management, blood transfusion, reflux treatment    Nausea, dehydration  Improved with supportive measures    Zuly Evans MD  04/18/21  15:34 EDT

## 2021-04-18 NOTE — ED PROVIDER NOTES
Subjective   Ms. Strickland is a 71-year-old female comes the emergency department today complaining of having chest pain and increasing shortness of breath.  She reports that she has endometrial cancer with metastasis to her lungs.  She reports that over the past few days she been having increasing shortness of breath she sees Dr. Zuly Crowell and has been receiving chemotherapy.  She reports that elastic CT scan revealed some may be possible mild improvement definitely nothing getting worse.  She reports that she had no productive cough no fevers no chills.  She had chest pain that is been sharp and stabbing the anterior chest wall area.  She had no falls no trauma.  She denies loss of smell or taste.  She has had known exposure to the Covid virus.      History provided by:  Patient   used: No    Shortness of Breath  Severity:  Moderate  Onset quality:  Gradual  Duration:  2 days  Timing:  Constant  Progression:  Worsening  Chronicity: Chronic shortness of breath of COPD but worsening over the past 2 days.  Context: activity    Context: not fumes, not smoke exposure and not URI    Context comment:  Known mets from endometrial cancer.  Currently receiving chemotherapy.  Relieved by:  Rest  Worsened by:  Activity and exertion  Ineffective treatments:  None tried  Associated symptoms: cough and wheezing    Associated symptoms: no abdominal pain, no chest pain, no claudication, no diaphoresis, no ear pain, no fever, no hemoptysis, no neck pain, no PND, no rash and no sputum production    Risk factors: hx of cancer    Risk factors: no recent alcohol use, no family hx of DVT and no hx of PE/DVT        Review of Systems   Constitutional: Negative for diaphoresis and fever.   HENT: Negative for ear pain.    Respiratory: Positive for cough, shortness of breath and wheezing. Negative for hemoptysis and sputum production.    Cardiovascular: Negative for chest pain, palpitations, claudication and PND.    Gastrointestinal: Negative for abdominal pain.   Genitourinary: Negative for dysuria, frequency and urgency.   Musculoskeletal: Negative for back pain and neck pain.   Skin: Negative for rash.   Psychiatric/Behavioral: Negative.    All other systems reviewed and are negative.      Past Medical History:   Diagnosis Date   • Achalasia, esophageal    • Anxiety and depression    • Arthritis    • Chronic back pain    • Chronic bronchitis (CMS/HCC)    • COPD (chronic obstructive pulmonary disease) (CMS/[facility])     no home O2   • Diabetes (CMS/HCC)    • Endometrial cancer (CMS/HCC) 11/2019    Stage II   • Essential hypertension 7/5/2019   • Frequent urination    • Gall stones    • GERD (gastroesophageal reflux disease)    • Heart murmur    • High blood pressure    • History of brachytherapy 03/11/2020    vagina   • History of prolapse of bladder    • History of radiation therapy 03/03/2020    Pelvis   • Pinched nerve     back   • Spinal stenosis    • Wears dentures    • Wears glasses        Allergies   Allergen Reactions   • Augmentin [Amoxicillin-Pot Clavulanate] Nausea And Vomiting   • Biaxin [Clarithromycin] Hallucinations       Past Surgical History:   Procedure Laterality Date   • COLONOSCOPY  01/2020   • ENDOSCOPY     • EPIDURAL  02/2020   • HELLER MYOTOMY LAPAROSCOPIC WITH ENDOSCOPY  2012   • TOTAL LAPAROSCOPIC HYSTERECTOMY SALPINGO OOPHORECTOMY Bilateral 11/15/2019    Procedure: TYPE 1 RADICAL TOTAL LAPAROSCOPIC HYSTERECTOMY BILATERAL SALPINGOOPHORECTOMY, LYSIS OF ADHESTIONS, BILATERAL PELVIC LYMPH NODE DISSECTION WITH DAVINCI ROBOT;  Surgeon: Zuly Evans MD;  Location: [facility];  Service: DaVinci   • UPPER GASTROINTESTINAL ENDOSCOPY  01/2020       Family History   Problem Relation Age of Onset   • Heart attack Mother    • Uterine cancer Mother 84   • Lung cancer Brother    • Colon cancer Half-Sister 50   • Colon cancer Paternal Aunt    • Colon cancer Cousin    • Heart attack Brother    • Hypertension  Daughter    • Hypertension Son        Social History     Socioeconomic History   • Marital status:      Spouse name: Not on file   • Number of children: 2   • Years of education: Not on file   • Highest education level: Not on file   Tobacco Use   • Smoking status: Current Every Day Smoker     Packs/day: 1.00     Years: 30.00     Pack years: 30.00     Types: Cigarettes   • Smokeless tobacco: Never Used   Vaping Use   • Vaping Use: Former   • Substances: Nicotine, Flavoring   • Devices: Pre-filled or refillable cartridge, Pre-filled pod   Substance and Sexual Activity   • Alcohol use: No   • Drug use: No   • Sexual activity: Defer           Objective   Physical Exam  Vitals and nursing note reviewed.   Constitutional:       General: She is not in acute distress.     Appearance: She is well-developed. She is not diaphoretic.      Comments: Cachectic smiling nontoxic   HENT:      Head: Normocephalic and atraumatic.      Nose: Nose normal.   Eyes:      General: No scleral icterus.     Conjunctiva/sclera: Conjunctivae normal.   Cardiovascular:      Rate and Rhythm: Normal rate and regular rhythm.      Heart sounds: Normal heart sounds. No murmur heard.     Pulmonary:      Effort: Pulmonary effort is normal. No respiratory distress.      Breath sounds: Examination of the right-lower field reveals wheezing. Examination of the left-lower field reveals wheezing. Decreased breath sounds and wheezing present.   Abdominal:      General: Bowel sounds are normal.      Palpations: Abdomen is soft.      Tenderness: There is no abdominal tenderness.   Musculoskeletal:         General: Normal range of motion.      Cervical back: Normal range of motion and neck supple.   Skin:     General: Skin is warm and dry.   Neurological:      Mental Status: She is alert and oriented to person, place, and time.   Psychiatric:         Behavior: Behavior normal.         Procedures           ED Course  ED Course as of Apr 18 2341   Sat Apr  17, 2021 2054 Spoke to Dr. Crowell who is managing her cancer.  She would like the hospitalist to admit her she will see her in the morning.  She is concerned about the low platelet count and the progression of her tumors.    [AURA]   2200 Discussed the findings with the patient and Dr. Webber she is agreed to admit the patient.    [AURA]      ED Course User Index  [AURA] David Young PA                                 Recent Results (from the past 24 hour(s))   Prepare Platelet Pheresis, 2 Units    Collection Time: 04/18/21  3:36 AM   Result Value Ref Range    Product Code G6288W34     Unit Number [id number]     UNIT  ABO A     UNIT  RH POS     Dispense Status IS     Blood Expiration Date 202104202359     Blood Type Barcode 6200     Product Code V6039M50     Unit Number [id number]     UNIT  ABO AB     UNIT  RH POS     Dispense Status IS     Blood Expiration Date 202104202359     Blood Type Barcode 8400    Comprehensive Metabolic Panel    Collection Time: 04/18/21  6:26 AM    Specimen: Blood   Result Value Ref Range    Glucose 77 65 - 99 mg/dL    BUN 37 (H) 8 - 23 mg/dL    Creatinine 0.65 0.57 - 1.00 mg/dL    Sodium 140 136 - 145 mmol/L    Potassium 3.4 (L) 3.5 - 5.2 mmol/L    Chloride 104 98 - 107 mmol/L    CO2 27.0 22.0 - 29.0 mmol/L    Calcium 8.8 8.6 - 10.5 mg/dL    Total Protein 5.6 (L) 6.0 - 8.5 g/dL    Albumin 3.20 (L) 3.50 - 5.20 g/dL    ALT (SGPT) 12 1 - 33 U/L    AST (SGOT) 17 1 - 32 U/L    Alkaline Phosphatase 72 39 - 117 U/L    Total Bilirubin 0.3 0.0 - 1.2 mg/dL    eGFR Non African Amer 90 >60 mL/min/1.73    Globulin 2.4 gm/dL    A/G Ratio 1.3 g/dL    BUN/Creatinine Ratio 56.9 (H) 7.0 - 25.0    Anion Gap 9.0 5.0 - 15.0 mmol/L   Protime-INR    Collection Time: 04/18/21  6:26 AM    Specimen: Blood   Result Value Ref Range    Protime 12.7 11.4 - 14.4 Seconds    INR 0.98 0.85 - 1.16   Ferritin    Collection Time: 04/18/21  6:26 AM    Specimen: Blood   Result Value Ref Range    Ferritin 499.80  (H) 13.00 - 150.00 ng/mL   Iron Profile    Collection Time: 04/18/21  6:26 AM    Specimen: Blood   Result Value Ref Range    Iron 94 37 - 145 mcg/dL    Iron Saturation 44 20 - 50 %    Transferrin 143 (L) 200 - 360 mg/dL    TIBC 213 (L) 298 - 536 mcg/dL   Magnesium    Collection Time: 04/18/21  6:26 AM    Specimen: Blood   Result Value Ref Range    Magnesium 1.5 (L) 1.6 - 2.4 mg/dL   Phosphorus    Collection Time: 04/18/21  6:26 AM    Specimen: Blood   Result Value Ref Range    Phosphorus 3.0 2.5 - 4.5 mg/dL   CBC Auto Differential    Collection Time: 04/18/21  6:26 AM    Specimen: Blood   Result Value Ref Range    WBC 1.77 (C) 3.40 - 10.80 10*3/mm3    RBC 2.07 (L) 3.77 - 5.28 10*6/mm3    Hemoglobin 6.1 (C) 12.0 - 15.9 g/dL    Hematocrit 19.3 (C) 34.0 - 46.6 %    MCV 93.2 79.0 - 97.0 fL    MCH 29.5 26.6 - 33.0 pg    MCHC 31.6 31.5 - 35.7 g/dL    RDW 17.2 (H) 12.3 - 15.4 %    RDW-SD 57.7 (H) 37.0 - 54.0 fl    MPV 9.9 6.0 - 12.0 fL    Platelets 113 (L) 140 - 450 10*3/mm3    Neutrophil % 59.9 42.7 - 76.0 %    Lymphocyte % 29.9 19.6 - 45.3 %    Monocyte % 9.6 5.0 - 12.0 %    Eosinophil % 0.0 (L) 0.3 - 6.2 %    Basophil % 0.0 0.0 - 1.5 %    Immature Grans % 0.6 (H) 0.0 - 0.5 %    Neutrophils, Absolute 1.06 (L) 1.70 - 7.00 10*3/mm3    Lymphocytes, Absolute 0.53 (L) 0.70 - 3.10 10*3/mm3    Monocytes, Absolute 0.17 0.10 - 0.90 10*3/mm3    Eosinophils, Absolute 0.00 0.00 - 0.40 10*3/mm3    Basophils, Absolute 0.00 0.00 - 0.20 10*3/mm3    Immature Grans, Absolute 0.01 0.00 - 0.05 10*3/mm3    nRBC 0.0 0.0 - 0.2 /100 WBC   aPTT    Collection Time: 04/18/21  6:26 AM    Specimen: Blood   Result Value Ref Range    PTT 31.1 22.0 - 39.0 seconds   Scan Slide    Collection Time: 04/18/21  6:26 AM    Specimen: Blood   Result Value Ref Range    Hypochromia Slight/1+ None Seen    WBC Morphology Normal Normal    Platelet Morphology Normal Normal   POC Glucose Once    Collection Time: 04/18/21  7:36 AM    Specimen: Blood   Result Value Ref  Range    Glucose 92 70 - 130 mg/dL   POC Glucose Once    Collection Time: 04/18/21 11:18 AM    Specimen: Blood   Result Value Ref Range    Glucose 199 (H) 70 - 130 mg/dL   Prepare RBC, 2 Units    Collection Time: 04/18/21  1:49 PM   Result Value Ref Range    Product Code E8997X03     Unit Number [id number]     UNIT  ABO A     UNIT  RH NEG     Crossmatch Interpretation Compatible     Dispense Status IS     Blood Expiration Date 202104272359     Blood Type Barcode 0600     Product Code D6764U12     Unit Number [id number]     UNIT  ABO A     UNIT  RH NEG     Crossmatch Interpretation Compatible     Dispense Status IS     Blood Expiration Date 202104272359     Blood Type Barcode 0600    Potassium    Collection Time: 04/18/21  3:51 PM    Specimen: Blood   Result Value Ref Range    Potassium 4.7 3.5 - 5.2 mmol/L   POC Glucose Once    Collection Time: 04/18/21  5:54 PM    Specimen: Blood   Result Value Ref Range    Glucose 160 (H) 70 - 130 mg/dL   POC Glucose Once    Collection Time: 04/18/21  8:09 PM    Specimen: Blood   Result Value Ref Range    Glucose 172 (H) 70 - 130 mg/dL     Note: In addition to lab results from this visit, the labs listed above may include labs taken at another facility or during a different encounter within the last 24 hours. Please correlate lab times with ED admission and discharge times for further clarification of the services performed during this visit.    CT Angiogram Chest   Final Result   There is marked progression of metastatic disease in the chest since the previous examination. The segmental pulmonary artery to the right middle lobe is encased by tumor and narrowed as a result. This appears similar to the previous examination. No   evidence of pulmonary embolism.      Signer Name: Austin Sheets MD    Signed: 4/17/2021 8:30 PM    Workstation Name: [id number]     [facility]      XR Chest 1 View   Final Result   Interval decrease seen in size of the  masslike densities   within the lung fields bilaterally, left greater than right.   Portacatheter identified on the left with tip in the SVC. No   superimposed infectious process present.       DICTATED:   04/17/2021   EDITED/ls :   04/18/2021       This report was finalized on 4/18/2021 11:53 AM by Dr. Kalani Laurent MD.            Vitals:    04/18/21 2009 04/18/21 2050 04/18/21 2100 04/18/21 2300   BP:       BP Location:       Patient Position:       Pulse: 76 82 83 77   Resp: 16      Temp:       TempSrc:       SpO2: 99%      Weight:       Height:         Medications   sodium chloride 0.9 % flush 10 mL (has no administration in time range)   sodium chloride 0.9 % flush 10 mL (has no administration in time range)   albuterol (PROVENTIL) nebulizer solution 0.083% 2.5 mg/3mL (has no administration in time range)   gabapentin (NEURONTIN) capsule 300 mg (300 mg Oral Given 4/18/21 2103)   DULoxetine (CYMBALTA) DR capsule 20 mg (20 mg Oral Given 4/18/21 0826)   atorvastatin (LIPITOR) tablet 20 mg (20 mg Oral Given 4/18/21 2103)   ferrous sulfate tablet 325 mg (325 mg Oral Given 4/18/21 0826)   oxybutynin XL (DITROPAN-XL) 24 hr tablet 5 mg (5 mg Oral Given 4/18/21 0826)   sodium chloride 0.9 % flush 10 mL (10 mL Intravenous Given 4/18/21 2104)   sodium chloride 0.9 % flush 1-10 mL (has no administration in time range)   acetaminophen (TYLENOL) tablet 650 mg (650 mg Oral Given 4/18/21 0827)     Or   acetaminophen (TYLENOL) 160 MG/5ML solution 650 mg ( Oral Not Given:  See Alt 4/18/21 0827)     Or   acetaminophen (TYLENOL) suppository 650 mg ( Rectal Not Given:  See Alt 4/18/21 0827)   ondansetron (ZOFRAN) tablet 4 mg (4 mg Oral Given 4/18/21 0239)     Or   ondansetron (ZOFRAN) injection 4 mg ( Intravenous Not Given:  See Alt 4/18/21 0239)   dextrose (GLUTOSE) oral gel 15 g (has no administration in time range)   dextrose (D50W) 25 g/ 50mL Intravenous Solution 25 g (has no administration in time range)   glucagon  (human recombinant) (GLUCAGEN DIAGNOSTIC) injection 1 mg (has no administration in time range)   HYDROcodone-acetaminophen (NORCO) 5-325 MG per tablet 1 tablet (1 tablet Oral Given 4/18/21 2102)   insulin lispro (humaLOG) injection 0-7 Units (2 Units Subcutaneous Given 4/18/21 1803)   pantoprazole (PROTONIX) injection 40 mg (40 mg Intravenous Given 4/18/21 2102)   sodium chloride 0.9 % infusion (75 mL/hr Intravenous New Bag 4/18/21 1657)   potassium chloride (MICRO-K) CR capsule 40 mEq (40 mEq Oral Given 4/18/21 1145)     Or   potassium chloride (KLOR-CON) packet 40 mEq ( Oral Not Given:  See Alt 4/18/21 1145)     Or   potassium chloride 10 mEq in 100 mL IVPB ( Intravenous Not Given:  See Alt 4/18/21 1145)   Magnesium Sulfate 2 gram Bolus, followed by 8 gram infusion (total Mg dose 10 grams)- Mg less than or equal to 1mg/dL ( Intravenous Not Given:  See Alt 4/18/21 1344)     Or   Magnesium Sulfate 2 gram / 50mL Infusion (GIVE X 3 BAGS TO EQUAL 6GM TOTAL DOSE) - Mg 1.1 - 1.5 mg/dl (2 g Intravenous New Bag 4/18/21 1344)     Or   Magnesium Sulfate 4 gram infusion- Mg 1.6-1.9 mg/dL ( Intravenous Not Given:  See Alt 4/18/21 1344)   ipratropium-albuterol (DUO-NEB) nebulizer solution 3 mL (3 mL Nebulization Given 4/18/21 2009)   calcium carbonate (TUMS) chewable tablet 500 mg (200 mg elemental) (has no administration in time range)   promethazine (PHENERGAN) tablet 6.25 mg (6.25 mg Oral Given 4/18/21 2103)   albuterol sulfate HFA (PROVENTIL HFA;VENTOLIN HFA;PROAIR HFA) inhaler 2 puff (2 puffs Inhalation Given 4/17/21 2021)   iopamidol (ISOVUE-370) 76 % injection 100 mL (64 mL Intravenous Given 4/17/21 2015)     ECG/EMG Results (last 24 hours)     Procedure Component Value Units Date/Time    ECG 12 Lead [[id number]] Collected: 04/17/21 1735     Updated: 04/17/21 1736        ECG 12 Lead   Final Result   Test Reason : chest pain   Blood Pressure : **/** mmHG   Vent. Rate : 100 BPM     Atrial Rate : 100 BPM      P-R Int : 122  ms          QRS Dur : 072 ms       QT Int : 336 ms       P-R-T Axes : 063 -01 057 degrees      QTc Int : 433 ms      Normal sinus rhythm   Normal ECG   When compared with ECG of 16-JAN-2012 13:47,   Vent. rate has increased BY  38 BPM   Confirmed by MATTHEW INGRAM ([id number]) on 4/18/2021 2:25:40 AM      Referred By:  EDMD           Confirmed By:MATTHEW INGRAM                    MDM  Number of Diagnoses or Management Options  Malignant neoplasm metastatic to lung, unspecified laterality (CMS/HCC): established and worsening  Shortness of breath: established and worsening  Thrombocytopenia (CMS/HCC): new and requires workup     Amount and/or Complexity of Data Reviewed  Clinical lab tests: ordered and reviewed  Tests in the radiology section of CPT®: ordered and reviewed  Tests in the medicine section of CPT®: reviewed and ordered  Decide to obtain previous medical records or to obtain history from someone other than the patient: yes  Discuss the patient with other providers: yes    Patient Progress  Patient progress: stable      Final diagnoses:   Shortness of breath   Malignant neoplasm metastatic to lung, unspecified laterality (CMS/HCC)   Thrombocytopenia (CMS/HCC)       ED Disposition  ED Disposition     ED Disposition Condition Comment    Decision to Admit  Level of Care: Telemetry [5]   Diagnosis: Shortness of breath [786.05.ICD-9-CM]   Admitting Physician: PADMINI WALLACE [[id number]]   Attending Physician: PADMINI WALLACE [[id number]]   Isolate for COVID?: No [0]   Bed Request Comments: tele   Certification: I Certify That Inpatient Hospital Services Are Medically Necessary For Greater Than 2 Midnights            No follow-up provider specified.       Medication List      No changes were made to your prescriptions during this visit.          David Young PA  04/18/21 6242

## 2021-04-18 NOTE — CONSULTS
"Clinical Nutrition   Reason For Visit: Identified at risk by screening criteria, Difficulty chewing/swallowing, MST score 2+, Unintentional weight loss, Reduced oral intake    Patient Name: Ele Strickland  YOB: 1950  MRN: [id number]  Date of Encounter: 04/18/21 10:30 EDT  Admission date: 4/17/2021      Nutrition Assessment     Admission Problem List:    Shortness of breath    Thrombocytopenia (CMS/HCC)  Generalized weakness  Endometrial cancer with mets  Last chemo (4/7/2021)      PMH: She  has a past medical history of Achalasia, esophageal, Anxiety and depression, Arthritis, Chronic back pain, Chronic bronchitis (CMS/HCC), COPD (chronic obstructive pulmonary disease) (CMS/[facility]), Diabetes (CMS/[facility]), Endometrial cancer (CMS/HCC) (11/2019), Essential hypertension (7/5/2019), Frequent urination, Gall stones, GERD (gastroesophageal reflux disease), Heart murmur, High blood pressure, History of brachytherapy (03/11/2020), History of prolapse of bladder, History of radiation therapy (03/03/2020), Pinched nerve, Spinal stenosis, Wears dentures, and Wears glasses.   PSxH: She  has a past surgical history that includes Esophagogastroduodenoscopy; Heller Myotomy (2012); total laparoscopic hysterectomy salpingo oophorectomy (Bilateral, 11/15/2019); Colonoscopy (01/2020); Upper gastrointestinal endoscopy (01/2020); and Epidural (02/2020).        Reported/Observed/Food/Nutrition Related History     Pt resting in bed at time of RD visit. Pt reports poor appetite/intake for the past 2 weeks, states that her appetite is better this AM and that she was able to eat breakfast this morning. Reports that she does not have any food allergies. States that she sometimes feels like she gets food stuck in her throat or \"strangled\" on foods, but that she dos not have issues chewing and wants to stay on a regular textured diet. Reports that she drinks chocolate flavored oral nutrition supplements. Likes yogurt, but otherwise no " other food preferences at this time.       Anthropometrics   Height: 64 in  Weight: 117 lb per bed scale (4/17)  BMI: 20.1  BMI classification: Normal: 18.5-24.9kg/m2     Weight change: Pt reports that her zahira fluctuates, but over all she has been losing weight over the past year. EMR weight hx reviewed, appears pt has lost ~46 lb unintentionally over the past 13 months.     Date Weight (kg) Weight (lbs) Weight Method   4/18/2021 53.978 kg 119 lb -   4/17/2021 53.162 kg 117 lb 3.2 oz Bed scale   4/17/2021 54.432 kg 120 lb Stated   4/14/2021 54.432 kg 120 lb -   4/13/2021 53.524 kg 118 lb -   4/7/2021 55.792 kg 123 lb -   3/25/2021 56.7 kg 125 lb -   3/17/2021 57.607 kg 127 lb -   2/25/2021 58.968 kg 130 lb -   2/24/2021 58.06 kg 128 lb -   2/3/2021 57.063 kg 125 lb 12.8 oz -   1/12/2021 60.782 kg 134 lb -   1/4/2021 62.143 kg 137 lb Stated   1/4/2021 59.875 kg 132 lb -   12/22/2020 58.968 kg 130 lb -   3/3/2020= 163 lb per outpatient RD note      Nutrition Focused Physical Exam     Unable to perform exam due to: Patient/family declined-- 2/2 pain    Subjective data: Pt reports that she can tell she has lost muscle mass in her arms and legs.       Labs reviewed   Labs reviewed: Yes    Results from last 7 days   Lab Units 04/18/21  0626 04/17/21  1805 04/13/21  1517   SODIUM mmol/L 140 137 136   POTASSIUM mmol/L 3.4* 4.2 4.1   CHLORIDE mmol/L 104 100 98   CO2 mmol/L 27.0 26.0 22.0   BUN mg/dL 37* 42* 35*   CREATININE mg/dL 0.65 0.76 0.89   GLUCOSE mg/dL 77 133* 112*   CALCIUM mg/dL 8.8 9.5 9.3   PHOSPHORUS mg/dL 3.0  --   --    MAGNESIUM mg/dL 1.5*  --   --      Results from last 7 days   Lab Units 04/18/21  0626 04/17/21  1805 04/13/21  1517   WBC 10*3/mm3 1.77* 3.81 4.00   ALBUMIN g/dL 3.20* 3.80 4.00     Results from last 7 days   Lab Units 04/18/21  0736 04/17/21  2216   GLUCOSE mg/dL 92 117     Lab Results   Lab Value Date/Time    HGBA1C 6.50 (H) 11/14/2019 1110     Medications reviewed   Medications reviewed:  Yes  Pertinent: ferrous sulfate, neurontin, insulin, protonix  GTT: NS at 75 ml/hr  PRN: tylenol, norco, zofran, phenergan      Current Nutrition Prescription   PO: Diet Regular; Low Sodium, Consistent Carbohydrate      Evaluation of Received Nutrient/Fluid Intake-PO:  Insufficient data       Nutrition Diagnosis   4/18  Problem Inadequate oral intake   Etiology Decreased appetite   Signs/Symptoms Pt reports decreased appetite, better today       4/18  Problem Unintended weight loss   Etiology Decreased PO intake   Signs/Symptoms Per pt report of decreased intake, ~46 lb weight loss over the past 13 months       Intervention   Intervention: Follow treatment progress, Care plan reviewed, Interview for preferences, Encourage intake, Supplement provided   -Will send chocolate Boost Glucose Control 3x/day  -If pt has any signs/symptoms of aspiration, then rec SLP eval to check pt's swallow function  -Food preferences noted and sent to the diet office      Goal:   General: Nutrition support treatment  PO: Establish PO, Tolerate PO       Monitoring/Evaluation:   Monitoring/Evaluation: Per protocol, PO intake, Supplement intake, Pertinent labs, Weight, Symptoms, Swallow function    Carissa Jauregui, MS RD/LD CNSC  Time Spent: 35 minutes

## 2021-04-18 NOTE — PLAN OF CARE
Goal Outcome Evaluation:        Outcome Summary: Ms. Ele Strickland arrived to the unit from the ED with a plt count of 11. She recieved 2 units of aphresis platelets. Morning labs have not yet been collected so an updated plt count is unavailable at this time. The pt. has remained on bed rest due to the increased risk for a bleed. She remains complaint and pleasant, however her home medication compliance seemed to be questionable. She wasn't sure of the medications she is taking, when she last took it, and whether or not she is supposed to take it. Education provided to pt of the importance of her medication regime. Will continue to educate and monitor.      Ramírez Tse RN

## 2021-04-18 NOTE — PLAN OF CARE
Goal Outcome Evaluation:     Progress: no change  Outcome Summary: Pt VSS on RA, got to units of PRBCs transfused today without complication.  She complained of some pain and nausea and was given phenergan and a norco with relief.  Will continue to monitor.

## 2021-04-18 NOTE — H&P
[facility] Medicine Services  HISTORY AND PHYSICAL    Patient Name: Ele Strickland  : 1950  MRN: [id number]  Primary Care Physician: Sharon Acosta DO  Date of admission: 2021      Subjective   Subjective     Chief Complaint:  SOB    HPI:  Ele Strickland is a 71 y.o. female with history of known endometrial cancer, last chemo on 2021-since chemo patient has been feeling more weak-occ lightheadedness  , complaining of shortness of breath-with exertion , chronic no pedal edema, chronic cough, chills but no fever, wheezing,    chest pain-burning sensation mid sternal area, running out of her Nexium, also L.side MSK like achy pain, worse with movement.  ,  Voice very hoarse-also has episodes of worsening hoarseness on off, coincides with her worsening reflux , no co of sore throat,     Denies bleeding per GI/Gu.  No N/V/D.  No new skin rash.  Chronic LLE-pain RX with excedrin and Advil prn.      Current COVID Risks are:  [] Fever []  Cough [] Shortness of breath [] Fatigue [] Change in taste or smell    [] Exposure to COVID positive patient  [] High risk facility   []  NONE  COVID VACCINE status  -not taken.      Review of Systems   Complete ROS done, which is negative except as above and HPI.  Old records reviewed and summarized in PM hx      Personal History     Past Medical History:   Endometrial carcinoma, diagnosed in 2019, with mets to chest, hilar adenopathy, -follows up with Dr. Crowell -s/p surgery, brachytherapy, s/p chemo-carbo/Taxol-last dose on 2021., Arimidex 1 mg daily, Decadron    CAD-aspirin 81 mg  GERD-Nexium 40 mg  Anemia-iron sulfate 325 mg daily  Neuropathy-Neurontin 300 mg every 8  DM 2-metformin 1000 mg every 12  Bladder dysfunction-Ditropan 5 mg p.o. daily  Hyperlipidemia-Zocor 40 mg p.o. daily  Hypertension-triamterene/HCTZ 37.5 mg daily.  Smoker  Anxiety/depression  Chronic back pain    Past Surgical History:   Procedure Laterality Date   •  COLONOSCOPY  01/2020   • ENDOSCOPY     • EPIDURAL  02/2020   • HELLER MYOTOMY LAPAROSCOPIC WITH ENDOSCOPY  2012   • TOTAL LAPAROSCOPIC HYSTERECTOMY SALPINGO OOPHORECTOMY Bilateral 11/15/2019    Procedure: TYPE 1 RADICAL TOTAL LAPAROSCOPIC HYSTERECTOMY BILATERAL SALPINGOOPHORECTOMY, LYSIS OF ADHESTIONS, BILATERAL PELVIC LYMPH NODE DISSECTION WITH DAVINCI ROBOT;  Surgeon: Zuly Evans MD;  Location: [facility];  Service: DaVinci   • UPPER GASTROINTESTINAL ENDOSCOPY  01/2020       Family History: family history includes Colon cancer in her cousin and paternal aunt; Colon cancer (age of onset: 50) in her half-sister; Heart attack in her brother and mother; Hypertension in her daughter and son; Lung cancer in her brother; Uterine cancer (age of onset: 84) in her mother. Otherwise pertinent FHx was reviewed and unremarkable.     Social History:  reports that she has been smoking cigarettes. She has a 30.00 pack-year smoking history. She has never used smokeless tobacco. She reports that she does not drink alcohol and does not use drugs.      Medications:  Available home medication information reviewed.  Medications Prior to Admission   Medication Sig Dispense Refill Last Dose   • albuterol sulfate  (90 Base) MCG/ACT inhaler Inhale 2 puffs Every 4 (Four) Hours As Needed for Wheezing.      • [START ON 5/5/2021] anastrozole (ARIMIDEX) 1 MG tablet Take 1 tablet by mouth Daily. 30 tablet 5    • aspirin 81 MG EC tablet Take 81 mg by mouth Daily.      • Aspirin-Acetaminophen-Caffeine (EXCEDRIN PO) Take  by mouth As Needed.      • dexamethasone (DECADRON) 4 MG tablet Take 2 tablets in the morning daily on days 2, 3 & 4.  Take with food. 6 tablet 5    • dexamethasone (DECADRON) 4 MG tablet Take 5 tablets by mouth the night before chemo 30 tablet 0    • DULoxetine (CYMBALTA) 60 MG capsule TAKE 1 CAPSULE BY MOUTH ONCE DAILY FOR 90 DAYS  1    • esomeprazole (nexIUM) 40 MG capsule Take 1 capsule by mouth Every Morning  Before Breakfast. 30 capsule 3    • ferrous sulfate 324 (65 Fe) MG tablet delayed-release EC tablet Take 324 mg by mouth Daily With Breakfast.      • gabapentin (NEURONTIN) 300 MG capsule Take 300 mg by mouth 3 (Three) Times a Day.  0    • lidocaine-prilocaine (EMLA) 2.5-2.5 % cream Apply  topically to the appropriate area as directed As Needed for Mild Pain  (prior to port access). 30 g 2    • loratadine (CLARITIN) 10 MG tablet Take 1 tablet by mouth Daily. 30 tablet 3    • metFORMIN (GLUCOPHAGE) 1000 MG tablet Take 1,000 mg by mouth 2 (Two) Times a Day.  1    • nystatin (MYCOSTATIN) 922579 UNIT/GM powder Apply  topically to the appropriate area as directed 3 (Three) Times a Day. 30 g 1    • ondansetron (ZOFRAN) 8 MG tablet Take 1 tablet by mouth 3 (Three) Times a Day As Needed for Nausea or Vomiting. 30 tablet 5    • oxybutynin XL (DITROPAN-XL) 5 MG 24 hr tablet Take 1 tablet by mouth Daily. 30 tablet 11    • promethazine (PHENERGAN) 25 MG tablet Take 1 tablet by mouth Every 6 (Six) Hours As Needed for Nausea or Vomiting. 30 tablet 5    • simvastatin (ZOCOR) 40 MG tablet Take 40 mg by mouth Daily.      • triamterene-hydrochlorothiazide (DYAZIDE) 37.5-25 MG per capsule Take 1 capsule by mouth Every Morning.  1        Allergies   Allergen Reactions   • Augmentin [Amoxicillin-Pot Clavulanate] Nausea And Vomiting   • Biaxin [Clarithromycin] Hallucinations       Objective   Objective     Vital Signs:   Temp:  [98.4 °F (36.9 °C)] 98.4 °F (36.9 °C)  Heart Rate:  [] 88  Resp:  [16] 16-96% RA  BP: (109-129)/(58-91) 118/74        Physical Exam     GENERAL- Not distressed, poorly nourished.  [id number] insp crackles.  CVS- s1s2 Regular,loud murmur-heard in all areas.  ABD- soft, nontender, not distended, no organomegaly.  EXT- no edema.  NEURO- AAO-3, power 5/5 in all ext, no gross sensory deficit, cranial nerves intact.  EYES- Conjunctivae are normal. Pupils are equal, round, and reactive to light. No scleral  icterus.   ENT- no external ear nose lesions, mucosa moist.  NECK- No JVD present. No tracheal deviation present. No thyromegaly present,No cervical lymphadenopathy.  JOINTS/MSK- no deformity, no swelling.  SKIN- no rash , warm to touch.  PSYCHIATRIC- Normal mood and affect. Behavior is normal. Thought content normal.     Results Reviewed:  I have personally reviewed current lab and radiology data.    Results from last 7 days   Lab Units 04/17/21  1805   WBC 10*3/mm3 3.81   HEMOGLOBIN g/dL 8.7*   HEMATOCRIT % 26.8*   PLATELETS 10*3/mm3 11*     Results from last 7 days   Lab Units 04/17/21 1957 04/17/21  1805   SODIUM mmol/L  --  137   POTASSIUM mmol/L  --  4.2   CHLORIDE mmol/L  --  100   CO2 mmol/L  --  26.0   BUN mg/dL  --  42*   CREATININE mg/dL  --  0.76   GLUCOSE mg/dL  --  133*   CALCIUM mg/dL  --  9.5   ALT (SGPT) U/L  --  11   AST (SGOT) U/L  --  20   TROPONIN T ng/mL <0.010 <0.010   PROBNP pg/mL  --  609.3     Estimated Creatinine Clearance: 54.2 mL/min (by C-G formula based on SCr of 0.76 mg/dL).  Brief Urine Lab Results  (Last result in the past 365 days)      Color   Clarity   Blood   Leuk Est   Nitrite   Protein   CREAT   Urine HCG        01/04/21 1610 Yellow Clear Negative Negative Negative Negative             Imaging Results (Last 24 Hours)     Procedure Component Value Units Date/Time    CT Angiogram Chest [[id number]] Collected: 04/17/21 2030     Updated: 04/17/21 2032    Narrative:      CTA Chest    INDICATION:   Metastatic disease in the lungs. Shortness of breath prior to arrival.    TECHNIQUE:   CT angiogram of the chest with 100 cc of Isovue-300 IV contrast. 3-D reconstructions were obtained and reviewed.   Radiation dose reduction techniques included automated exposure control or exposure modulation based on body size. Count of known CT and  cardiac nuc med studies performed in previous 12 months: 3.     COMPARISON:   3-21    FINDINGS:   Chest images at mediastinal window show no pulmonary  artery filling defects to suggest emboli. There is extensive mediastinal and hilar adenopathy especially the right hilum. The lymph nodes at the right hilum partially encase and narrow the segmental  pulmonary artery branch to the right middle lobe. No pleural or pericardial fluid is seen. There has been marked progression of metastatic disease in the chest since the previous examination. As a typical example, the diameter of the nodes at the right  hilum measures 3.5 cm compared with 2.8 cm on the previous exam. A node in the aortopulmonic window on the left measures 2.4 x 3 cm in transverse diameter compared with 0.8 x 2.8 cm on the previous exam.    Chest images at lung window show multiple lung masses, all of which are significantly larger since previous examination. As a typical example, there is a mass posteriorly in the left lower lobe measuring 2.3 x 2.7 cm. On the previous scan it measured 1.6  x 1.7 cm. Other mass lesions show similar progression.      Impression:      There is marked progression of metastatic disease in the chest since the previous examination. The segmental pulmonary artery to the right middle lobe is encased by tumor and narrowed as a result. This appears similar to the previous examination. No  evidence of pulmonary embolism.    Signer Name: Austin Sheets MD   Signed: 4/17/2021 8:30 PM   Workstation Name: [id number]    [facility]    XR Chest 1 View [[id number]] Collected: 04/17/21 1847     Updated: 04/17/21 1849    Narrative:         EXAMINATION: XR CHEST 1 VW-      INDICATION: Chest Pain triage protocol      COMPARISON: 01/11/2021     FINDINGS: Portable chest reveals cardiac and mediastinal silhouettes  within normal limits. Lung fields reveal interval improvement seen in  the appearance of the masslike densities within the lung fields  bilaterally. The heart is upper limits of normal in size. There is no  pleural effusion or pneumothorax. Degenerative changes  seen within the  spine. There are identified within the left tip in the SVC.       Impression:      Interval decrease seen in size of the masslike densities  within the lung fields bilaterally left greater than right.  Portacatheter identified on the left tip in the SVC. No superimposed  infectious process present.              Results for orders placed during the hospital encounter of 10/30/19    Adult Transthoracic Echo Complete W/ Cont if Necessary Per Protocol    Interpretation Summary  · Left ventricular systolic function is normal. Calculated EF = 58.0%. Estimated EF appears to be in the range of 56 - 60%.  · There is moderate aortic valve calcification. There is moderate aortic stenosis. Peak gradient 59 mmHg, mean gradient 32 mmHg. Dimensionless index 0.4. Calculated aortic valve area 1.13 cm²  · Mild to moderate aortic regurgitation with a pressure half-time of 577 ms. Vena contract of 0.4 cm  · Sigmoid-shaped ventricular septum is present  · Left ventricular diastolic (grade I) consistent with impaired relaxation.  · Mild tricuspid valve regurgitation is present. Estimated right ventricular systolic pressure from tricuspid regurgitation is mildly elevated (35-45 mmHg).    Troponin-less than 0.01, proBNP 600  Sodium 137, BUN/creatinine 42/0.7, LFTs-WNL, lipase-30,  WBC-4, hemoglobin of 9 (hemoglobin-9.5), platelets 11,  Ekg-  Cxr-increased masslike density bilateral lungs, CT angio chest  Extensive mediastinal/hilar adenopathy encasing the segmental pulmonary artery branch to right middle lobe with increased metastatic disease,.  EKG sinus rhythm 100 bpm,  no acute ST-T wave changes.    Assessment/Plan   Active Hospital Problems    Diagnosis  POA   • Shortness of breath [R06.02]  Yes   • Thrombocytopenia (CMS/HCC) [D69.6]  Unknown       Assessment & Plan   Gen weakness, pancytopenia-likely 2nd to recent chemo.  -transfuse plts, monitor h/h closely  -iv fluids for dehydration      Endometrial  carcinoma, diagnosed in 2019, with mets to chest, hilar adenopathy, -follows up with Dr. Crowell -s/p surgery, brachytherapy, s/p chemo-carbo/Taxol-last dose on 4/7/2021., Arimidex 1 mg daily, Decadron  -worsening metastatic dis in Lungs-consult Dr. High in am-for further wup.      GERD/achlasia-Nexium 40 mg-Protonix bid, since pt is co of worsening reflux sx.    Anemia/thrombocytopenia, likely from chemo -iron sulfate 325 mg daily-as abouve.    Neuropathy-Neurontin 300 mg every 8-ct.    DM 2-metformin 1000 mg every 12-f's coverage.    Bladder dysfunction-Ditropan 5 mg p.o. daily    Hyperlipidemia-Zocor 40 mg p.o. daily    Hypertension-triamterene/HCTZ 37.5 mg daily.-currently hypotensive, therefore hold.    Smoker-nicotine, smoking cessation advice given. Dry cough, but not wheezing, ct nebs, COVID test still pending.    Aortic Stenosis- may need to repeat echo, if pt still fills poorly after hydration      DVT prophylaxis:    -TEDs/SCDs  -Lovenox-none 2nd to thrombocytopenia.    CODE STATUS:    Code Status and Medical Interventions:   Ordered at: 04/17/21 4871     Level Of Support Discussed With:    Patient     Code Status:    CPR     Medical Interventions (Level of Support Prior to Arrest):    Full         Admission Status:  I believe this patient meets observation criteria.

## 2021-04-18 NOTE — THERAPY EVALUATION
Patient Name: Ele Strickland  : 1950    MRN: [id number]                              Today's Date: 2021       Admit Date: 2021    Visit Dx:     ICD-10-CM ICD-9-CM   1. Shortness of breath  R06.02 786.05   2. Malignant neoplasm metastatic to lung, unspecified laterality (CMS/[facility])  C78.00 197.0   3. Thrombocytopenia (CMS/[facility])  D69.6 287.5   4. Other dysphagia  R13.19 787.29     Patient Active Problem List   Diagnosis   • Heart murmur   • Dyspnea on exertion   • Cardiomegaly   • Lower extremity edema   • Tobacco abuse   • Chronic GERD   • Chronic obstructive lung disease (CMS/[facility])   • Neuropathy   • Nicotine dependence   • Systolic murmur   • Type 2 diabetes mellitus without complication (CMS/[facility])   • Achalasia of esophagus   • Endometrial cancer (CMS/[facility])   • Anxiety and depression   • Dyslipidemia   • Metastasis to lung (CMS/[facility])   • Submassive hemoptysis   • Weight loss   • Disturbance in emotion   • Dehydration   • Encounter for care related to vascular access port   • Chemotherapy-induced fatigue   • Antineoplastic chemotherapy induced anemia   • Cellulitis   • Shortness of breath   • Thrombocytopenia (CMS/HCC)     Past Medical History:   Diagnosis Date   • Achalasia, esophageal    • Anxiety and depression    • Arthritis    • Chronic back pain    • Chronic bronchitis (CMS/[facility])    • COPD (chronic obstructive pulmonary disease) (CMS/[facility])     no home O2   • Diabetes (CMS/[facility])    • Endometrial cancer (CMS/[facility]) 2019    Stage II   • Essential hypertension 2019   • Frequent urination    • Gall stones    • GERD (gastroesophageal reflux disease)    • Heart murmur    • High blood pressure    • History of brachytherapy 2020    vagina   • History of prolapse of bladder    • History of radiation therapy 2020    Pelvis   • Pinched nerve     back   • Spinal stenosis    • Wears dentures    • Wears glasses      Past Surgical History:   Procedure Laterality Date   • COLONOSCOPY  2020   •  ENDOSCOPY     • EPIDURAL  02/2020   • HELLER MYOTOMY LAPAROSCOPIC WITH ENDOSCOPY  2012   • TOTAL LAPAROSCOPIC HYSTERECTOMY SALPINGO OOPHORECTOMY Bilateral 11/15/2019    Procedure: TYPE 1 RADICAL TOTAL LAPAROSCOPIC HYSTERECTOMY BILATERAL SALPINGOOPHORECTOMY, LYSIS OF ADHESTIONS, BILATERAL PELVIC LYMPH NODE DISSECTION WITH DAVINCI ROBOT;  Surgeon: Zuly Evans MD;  Location: [facility];  Service: DaVinci   • UPPER GASTROINTESTINAL ENDOSCOPY  01/2020     General Information     Row Name 04/18/21 0830          Physical Therapy Time and Intention    Document Type  evaluation  -MB     Mode of Treatment  physical therapy  -MB     Row Name 04/18/21 0830          General Information    Patient Profile Reviewed  yes  -MB     Prior Level of Function  independent:;all household mobility;community mobility;gait;transfer;bed mobility;ADL's;home management;driving;work Pt. reports she works FT at a [employer].  -MB     Existing Precautions/Restrictions  fall bed level eval only d/t low Hgb/Hct  -MB     Barriers to Rehab  medically complex;previous functional deficit  -MB     Row Name 04/18/21 0830          Living Environment    Lives With  alone  -MB     Row Name 04/18/21 0830          Home Main Entrance    Number of Stairs, Main Entrance  two  -MB     Stair Railings, Main Entrance  none  -MB     Row Name 04/18/21 0830          Stairs Within Home, Primary    Number of Stairs, Within Home, Primary  none  -MB     Row Name 04/18/21 0830          Cognition    Orientation Status (Cognition)  oriented x 4  -MB     Row Name 04/18/21 0830          Safety Issues, Functional Mobility    Safety Issues Affecting Function (Mobility)  insight into deficits/self-awareness;safety precaution awareness  -MB     Impairments Affecting Function (Mobility)  balance;endurance/activity tolerance;strength;sensation/sensory awareness  -MB       User Key  (r) = Recorded By, (t) = Taken By, (c) = Cosigned By    Initials Name Provider Type    MB  Kerrie Richardson, PT Physical Therapist        Mobility     Row Name 04/18/21 0830          Bed Mobility    Bed Mobility  supine-sit;sit-supine  -MB     Supine-Sit [place] (Bed Mobility)  modified independence  -MB     Sit-Supine [place] (Bed Mobility)  modified independence  -MB     Assistive Device (Bed Mobility)  bed rails;head of bed elevated  -MB     Comment (Bed Mobility)  Pt. advanced to EOB and returned to supine w/out assist, w/ increased time to complete.  VSS.  -MB     Row Name 04/18/21 0830          Transfers    Comment (Transfers)  Deferred d/t low Hgb/Hct.  -MB     Row Name 04/18/21 0830          Bed-Chair Transfer    Bed-Chair [place] (Transfers)  unable to assess  -MB     Row Name 04/18/21 0830          Sit-Stand Transfer    Sit-Stand [place] (Transfers)  unable to assess  -MB     Row Name 04/18/21 0830          Gait/Stairs (Locomotion)    [place] Level (Gait)  unable to assess  -MB     Comment (Gait/Stairs)  Deferred d/t medical status.  -MB       User Key  (r) = Recorded By, (t) = Taken By, (c) = Cosigned By    Initials Name Provider Type    Kerrie Arreguin, PT Physical Therapist        Obj/Interventions     Row Name 04/18/21 0830          Range of Motion Comprehensive    General Range of Motion  no range of motion deficits identified  -MB     Row Name 04/18/21 0830          Strength Comprehensive (MMT)    General Manual Muscle Testing (MMT) Assessment  upper extremity strength deficits identified;lower extremity strength deficits identified  -MB     Comment, General Manual Muscle Testing (MMT) Assessment  BLEs grossly 4-/5  -MB     Row Name 04/18/21 0830          Motor Skills    Therapeutic Exercise  hip;knee;ankle  -MB     Row Name 04/18/21 0830          Hip (Therapeutic Exercise)    Hip (Therapeutic Exercise)  isometric exercises  -MB     Hip Isometrics (Therapeutic Exercise)  bilateral;gluteal sets  -MB     Row Name 04/18/21 0830          Knee (Therapeutic  Exercise)    Knee (Therapeutic Exercise)  isometric exercises;strengthening exercise  -MB     Knee Isometrics (Therapeutic Exercise)  bilateral;quad sets  -MB     Knee Strengthening (Therapeutic Exercise)  bilateral;LAQ (long arc quad)  -MB     Row Name 04/18/21 0830          Ankle (Therapeutic Exercise)    Ankle (Therapeutic Exercise)  AROM (active range of motion)  -MB     Ankle AROM (Therapeutic Exercise)  bilateral;dorsiflexion;plantarflexion  -MB     Row Name 04/18/21 0830          Balance    Balance Assessment  sitting static balance;sitting dynamic balance  -MB     Static Sitting Balance  WFL;unsupported;sitting, edge of bed  -MB     Dynamic Sitting Balance  WFL;unsupported;sitting, edge of bed  -MB     Balance Interventions  sitting;weight shifting activity  -MB       User Key  (r) = Recorded By, (t) = Taken By, (c) = Cosigned By    Initials Name Provider Type    Kerrie Arreguin, PT Physical Therapist        Goals/Plan     Row Name 04/18/21 0830          Bed Mobility Goal 1 (PT)    Activity/Assistive Device (Bed Mobility Goal 1, PT)  bed mobility activities, all  -MB     [place] Level/Cues Needed (Bed Mobility Goal 1, PT)  independent  -MB     Time Frame (Bed Mobility Goal 1, PT)  10 days  -MB     Progress/Outcomes (Bed Mobility Goal 1, PT)  goal ongoing  -MB     Row Name 04/18/21 0830          Transfer Goal 1 (PT)    Activity/Assistive Device (Transfer Goal 1, PT)  transfers, all  -MB     [place] Level/Cues Needed (Transfer Goal 1, PT)  independent  -MB     Time Frame (Transfer Goal 1, PT)  10 days  -MB     Progress/Outcome (Transfer Goal 1, PT)  goal ongoing  -MB     Row Name 04/18/21 0830          Gait Training Goal 1 (PT)    Activity/Assistive Device (Gait Training Goal 1, PT)  gait (walking locomotion)  -MB     [place] Level (Gait Training Goal 1, PT)  independent  -MB     Distance (Gait Training Goal 1, PT)  150  -MB     Time Frame (Gait Training Goal 1, PT)  10 days  -MB      Progress/Outcome (Gait Training Goal 1, PT)  goal ongoing  -MB     Row Name 04/18/21 0830          Stairs Goal 1 (PT)    Activity/Assistive Device (Stairs Goal 1, PT)  stairs, all skills;cane, straight  -MB     [place] Level/Cues Needed (Stairs Goal 1, PT)  modified independence  -MB     Number of Stairs (Stairs Goal 1, PT)  2  -MB     Time Frame (Stairs Goal 1, PT)  by discharge  -MB     Progress/Outcome (Stairs Goal 1, PT)  goal ongoing  -MB     Row Name 04/18/21 0830          Patient Education Goal (PT)    Activity (Patient Education Goal, PT)  HEP  -MB     [place]/Cues/Accuracy (Memory Goal 2, PT)  demonstrates adequately;verbalizes understanding  -MB     Time Frame (Patient Education Goal, PT)  10 days  -MB     Progress/Outcome (Patient Education Goal, PT)  goal ongoing  -MB       User Key  (r) = Recorded By, (t) = Taken By, (c) = Cosigned By    Initials Name Provider Type    Kerrie Arreguin, PT Physical Therapist        Clinical Impression     Row Name 04/18/21 0830          Pain    Additional Documentation  Pain Scale: Numbers Pre/Post-Treatment (Group);Pain Scale: FACES Pre/Post-Treatment (Group)  -MB     Row Name 04/18/21 0830          Pain Scale: FACES Pre/Post-Treatment    Pain: FACES Scale, Pretreatment  2-->hurts little bit  -MB     Posttreatment Pain Rating  2-->hurts little bit  -MB     Pain Location - Orientation  generalized  -MB     Row Name 04/18/21 0830          Plan of Care Review    Plan of Care Reviewed With  patient  -MB     Progress  improving  -MB     Outcome Summary  PT eval completed at bed level due to medical status.  Patient presents w/ decreased strength, decreased functional endurance, and functional decline from baseline.  Pt. able to perform bed mobility independently w/ increased time/effort; transfers and ambulation deferred d/t low Hgb/Hct.  Skilled IPPT indicated to improve pt's safety and independence w/ mobility.  Recommend home w/ assist and HHPT at D/C,  pending progress.  -MB     Row Name 04/18/21 0830          Therapy Assessment/Plan (PT)    Patient/Family Therapy Goals Statement (PT)  Return to home, PLOF, work.  -MB     Rehab Potential (PT)  good, to achieve stated therapy goals  -MB     Criteria for Skilled Interventions Met (PT)  yes;meets criteria;skilled treatment is necessary  -MB     Row Name 04/18/21 0830          Vital Signs    Pre Systolic BP Rehab  125  -MB     Pre Treatment Diastolic BP  61  -MB     Intra Systolic BP Rehab  112  -MB     Intra Treatment Diastolic BP  67  -MB     Post Systolic BP Rehab  114  -MB     Post Treatment Diastolic BP  59  -MB     Pretreatment Heart Rate (beats/min)  72  -MB     Posttreatment Heart Rate (beats/min)  77  -MB     Pre SpO2 (%)  95  -MB     O2 Delivery Pre Treatment  room air  -MB     O2 Delivery Intra Treatment  room air  -MB     Post SpO2 (%)  95  -MB     O2 Delivery Post Treatment  room air  -MB     Pre Patient Position  Supine  -MB     Intra Patient Position  Sitting  -MB     Post Patient Position  Supine  -MB     Row Name 04/18/21 0830          Positioning and Restraints    Pre-Treatment Position  in bed  -MB     Post Treatment Position  bed  -MB     In Bed  notified nsg;supine;call light within reach;encouraged to call for assist;exit alarm on  -MB       User Key  (r) = Recorded By, (t) = Taken By, (c) = Cosigned By    Initials Name Provider Type    Kerrie Arreguin, PT Physical Therapist        Outcome Measures     Row Name 04/18/21 0830          How much help from another person do you currently need...    Turning from your back to your side while in flat bed without using bedrails?  4  -MB     Moving from lying on back to sitting on the side of a flat bed without bedrails?  4  -MB     Moving to and from a bed to a chair (including a wheelchair)?  3  -MB     Standing up from a chair using your arms (e.g., wheelchair, bedside chair)?  3  -MB     Climbing 3-5 steps with a railing?  3  -MB     To walk in  hospital room?  3  -MB     AM-PAC 6 Clicks Score (PT)  20  -MB     Row Name 04/18/21 0830          Functional Assessment    Outcome Measure Options  AM-PAC 6 Clicks Basic Mobility (PT)  -MB       User Key  (r) = Recorded By, (t) = Taken By, (c) = Cosigned By    Initials Name Provider Type    Kerrie Arreguin, PT Physical Therapist        Physical Therapy Education                 Title: PT OT SLP Therapies (In Progress)     Topic: Physical Therapy (Done)     Point: Mobility training (Done)     Learning Progress Summary           Patient Acceptance, E,D, VU,NR by MB at 4/18/2021 1336                   Point: Home exercise program (Done)     Learning Progress Summary           Patient Acceptance, E,D, VU,NR by MB at 4/18/2021 1336                   Point: Body mechanics (Done)     Learning Progress Summary           Patient Acceptance, E,D, VU,NR by MB at 4/18/2021 1336                   Point: Precautions (Done)     Learning Progress Summary           Patient Acceptance, E,D, VU,NR by MB at 4/18/2021 1336                               User Key     Initials Effective Dates Name Provider Type Discipline    MB 03/14/16 -  Kerrie Richardson, PT Physical Therapist PT              PT Recommendation and Plan  Planned Therapy Interventions (PT): balance training, bed mobility training, gait training, home exercise program, patient/family education, stair training, strengthening, transfer training  Plan of Care Reviewed With: patient  Progress: improving  Outcome Summary: PT eval completed at bed level due to medical status.  Patient presents w/ decreased strength, decreased functional endurance, and functional decline from baseline.  Pt. able to perform bed mobility independently w/ increased time/effort; transfers and ambulation deferred d/t low Hgb/Hct.  Skilled IPPT indicated to improve pt's safety and independence w/ mobility.  Recommend home w/ assist and HHPT at D/C, pending progress.     Time Calculation:   PT  Charges     Row Name 04/18/21 1337             Time Calculation    Start Time  0830  -MB      PT Received On  04/18/21  -MB      PT Goal Re-Cert Due Date  04/28/21  -MB        User Key  (r) = Recorded By, (t) = Taken By, (c) = Cosigned By    Initials Name Provider Type    Kerrie Arreguin, PT Physical Therapist        Therapy Charges for Today     Code Description Service Date Service Provider Modifiers Qty    88341636695 HC PT EVAL MOD COMPLEXITY 4 4/18/2021 Kerrie Richardson, PT GP 1          PT G-Codes  Outcome Measure Options: AM-PAC 6 Clicks Daily Activity (OT)  AM-PAC 6 Clicks Score (PT): 20  AM-PAC 6 Clicks Score (OT): 17    Kerrie Richardson PT  4/18/2021

## 2021-04-18 NOTE — THERAPY EVALUATION
Acute Care - Speech Language Pathology   Swallow Initial Evaluation [facility]   Clinical Swallow Evaluation     Patient Name: Ele Strickland  : 1950  MRN: [id number]  Today's Date: 2021               Admit Date: 2021    Visit Dx:     ICD-10-CM ICD-9-CM   1. Shortness of breath  R06.02 786.05   2. Malignant neoplasm metastatic to lung, unspecified laterality (CMS/[facility])  C78.00 197.0   3. Thrombocytopenia (CMS/HCC)  D69.6 287.5   4. Other dysphagia  R13.19 787.29     Patient Active Problem List   Diagnosis   • Heart murmur   • Dyspnea on exertion   • Cardiomegaly   • Lower extremity edema   • Tobacco abuse   • Chronic GERD   • Chronic obstructive lung disease (CMS/HCC)   • Neuropathy   • Nicotine dependence   • Systolic murmur   • Type 2 diabetes mellitus without complication (CMS/HCC)   • Achalasia of esophagus   • Endometrial cancer (CMS/HCC)   • Anxiety and depression   • Dyslipidemia   • Metastasis to lung (CMS/HCC)   • Submassive hemoptysis   • Weight loss   • Disturbance in emotion   • Dehydration   • Encounter for care related to vascular access port   • Chemotherapy-induced fatigue   • Antineoplastic chemotherapy induced anemia   • Cellulitis   • Shortness of breath   • Thrombocytopenia (CMS/HCC)     Past Medical History:   Diagnosis Date   • Achalasia, esophageal    • Anxiety and depression    • Arthritis    • Chronic back pain    • Chronic bronchitis (CMS/HCC)    • COPD (chronic obstructive pulmonary disease) (CMS/HCC)     no home O2   • Diabetes (CMS/HCC)    • Endometrial cancer (CMS/HCC) 2019    Stage II   • Essential hypertension 2019   • Frequent urination    • Gall stones    • GERD (gastroesophageal reflux disease)    • Heart murmur    • High blood pressure    • History of brachytherapy 2020    vagina   • History of prolapse of bladder    • History of radiation therapy 2020    Pelvis   • Pinched nerve     back   • Spinal stenosis    • Wears dentures    • Wears  "glasses      Past Surgical History:   Procedure Laterality Date   • COLONOSCOPY  01/2020   • ENDOSCOPY     • EPIDURAL  02/2020   • HELLER MYOTOMY LAPAROSCOPIC WITH ENDOSCOPY  2012   • TOTAL LAPAROSCOPIC HYSTERECTOMY SALPINGO OOPHORECTOMY Bilateral 11/15/2019    Procedure: TYPE 1 RADICAL TOTAL LAPAROSCOPIC HYSTERECTOMY BILATERAL SALPINGOOPHORECTOMY, LYSIS OF ADHESTIONS, BILATERAL PELVIC LYMPH NODE DISSECTION WITH DAVINCI ROBOT;  Surgeon: Zuly Evans MD;  Location: [facility];  Service: DaVinci   • UPPER GASTROINTESTINAL ENDOSCOPY  01/2020        SWALLOW EVALUATION (last 72 hours)      SLP Adult Swallow Evaluation     Row Name 04/18/21 1010          Document Type  evaluation  -SM    Subjective Information  no complaints  -SM    Patient Observations  alert;cooperative  -SM          Patient Profile Reviewed  yes  -SM    Pertinent History Of Current Problem  Adn SOA and cough. Worsening mets in lungs. GERD, with increased discomfort recently, voice off/on hoarse r/t reflux.   -SM    Current Method of Nutrition  regular textures;thin liquids  -SM    Prior Level of Function-Swallowing  esophageal concerns  -    Plans/Goals Discussed with  patient;agreed upon  -    Barriers to Rehab  previous functional deficit  -    Patient's Goals for Discharge  patient did not state  -SM          Pain: FACES Scale, Pretreatment  2-->hurts little bit  -SM    Posttreatment Pain Rating  2-->hurts little bit  -SM          Oral Prep Phase  WFL  -SM    Oral Transit  WFL  -SM    Oral Residue  WFL  -SM    Pharyngeal Phase  no overt signs/symptoms of pharyngeal impairment  -SM    Esophageal Phase  suspected esophageal impairment  -SM          Esophageal Phase Concerns  other (see comments)  -SM    Esophageal Phase Concerns, Comment  Pt reports hx achalasia with EGD/surgery in 2012 to address. Reports significantly improved though has recently felt return of similar symptoms, feeling \"sticking\" and as if material is not passing through " efficiently. No regurgitation reported. Discussed options with pt to include GI f/u or esophagram during admit. Pt acknwledges likely needs another EGD. Prefers to address as OP once acute illness improves. In agreement. If symptoms worsen or begins to experience regurgitation, reconsider for w/u during admit. Pt in agreement.   -          SLP Swallowing Diagnosis  swallow WFL;other (see comments) known hx esophageal dysphagia and GERD  -    Swallow Criteria for Skilled Therapeutic Interventions Met  no problems identified which require skilled intervention  -          Therapy Frequency (Swallow)  evaluation only  -    SLP Diet Recommendation  regular textures;thin liquids  -    Recommended Precautions and Strategies  reflux precautions  -    SLP Rec. for Method of Medication Administration  as tolerated  -    Demonstrates Need for Referral to Another Service  other (see comments) if esophageal symptoms worsed, consdier GI as IP  -      User Key  (r) = Recorded By, (t) = Taken By, (c) = Cosigned By    Initials Name Effective Dates    Ani Morillo MS CCC-SLP 08/09/20 -           EDUCATION  The patient has been educated in the following areas:   Dysphagia (Swallowing Impairment) Modified Diet Instruction.    SLP Recommendation and Plan  SLP Swallowing Diagnosis: swallow WFL, other (see comments) (known hx esophageal dysphagia and GERD)  SLP Diet Recommendation: regular textures, thin liquids  Recommended Precautions and Strategies: reflux precautions  SLP Rec. for Method of Medication Administration: as tolerated           Swallow Criteria for Skilled Therapeutic Interventions Met: no problems identified which require skilled intervention        Therapy Frequency (Swallow): evaluation only     Demonstrates Need for Referral to Another Service: other (see comments) (if esophageal symptoms worsed, consdier GI as IP)                      Plan of Care Reviewed With: patient           Time  Calculation:   Time Calculation- SLP     Row Name 04/18/21 1322             Time Calculation- SLP    SLP Start Time  1010  -      SLP Received On  04/18/21  -        User Key  (r) = Recorded By, (t) = Taken By, (c) = Cosigned By    Initials Name Provider Type    Ani Morillo MS CCC-SLP Speech and Language Pathologist          Therapy Charges for Today     Code Description Service Date Service Provider Modifiers Qty    [id number] HC ST EVAL ORAL PHARYNG SWALLOW 3 4/18/2021 Ani Gallegos MS CCC-SLP GN 1          Patient was not wearing a face mask and did not exhibit coughing during this therapy encounter.  Procedure performed was aerosolizing, involved close contact (within 6 feet for at least 15 minutes or longer), and did not involve contact with infectious secretions or specimens.  Therapist used appropriate personal protective equipment including gloves, standard procedure mask and eye protection.  Appropriate PPE was worn during the entire therapy session.  Hand hygiene was completed before and after therapy session.       Ani Gallegos MS CCC-SLP  4/18/2021

## 2021-04-18 NOTE — PROGRESS NOTES
[facility] Medicine Services  PROGRESS NOTE    Patient Name: Ele Strickland  : 1950  MRN: [id number]    Date of Admission: 2021  Primary Care Physician: Sharon Acosta DO    Subjective   Subjective     CC:  SOA, CP     HPI:  States she has been having left sided rib/shoulder pain that is worse with movement x 2 weeks. Associated SOA. States turning over on right side would help alleviate pain. Reviewed transfusions.     ROS:  Gen- No fevers, chills  CV- + chest pain, palpitations  Resp- No cough, + dyspnea  GI- No N/V/D, abd pain     Objective   Objective     Vital Signs:   Temp:  [97.5 °F (36.4 °C)-98.7 °F (37.1 °C)] 97.9 °F (36.6 °C)  Heart Rate:  [] 72  Resp:  [16-18] 18  BP: (109-146)/(58-91) 125/61        Physical Exam:  Constitutional: No acute distress, awake, alert; frail  HENT: NCAT, mucous membranes moist  Respiratory: Clear to auscultation bilaterally, respiratory effort normal   Cardiovascular: RRR, IV/VI murmur   Gastrointestinal: Positive bowel sounds, soft, nontender, nondistended  Musculoskeletal: No bilateral ankle edema  Psychiatric: Appropriate affect, cooperative  Neurologic: Oriented x 3, strength symmetric in all extremities, Cranial Nerves grossly intact to confrontation, speech clear  Skin: No rashes    Results Reviewed:  Results from last 7 days   Lab Units 21  1517   WBC 10*3/mm3 1.77* 3.81 4.00   HEMOGLOBIN g/dL 6.1* 8.7* 9.5*   HEMATOCRIT % 19.3* 26.8* 29.1*   PLATELETS 10*3/mm3 113* 11* 44*   INR  0.98  --   --      Results from last 7 days   Lab Units 21  1517   SODIUM mmol/L 140  --  137 136   POTASSIUM mmol/L 3.4*  --  4.2 4.1   CHLORIDE mmol/L 104  --  100 98   CO2 mmol/L 27.0  --  26.0 22.0   BUN mg/dL 37*  --  42* 35*   CREATININE mg/dL 0.65  --  0.76 0.89   GLUCOSE mg/dL 77  --  133* 112*   CALCIUM mg/dL 8.8  --  9.5 9.3   ALT (SGPT) U/L 12  --  11  14   AST (SGOT) U/L 17  --  20 17   TROPONIN T ng/mL  --  <0.010 <0.010  --    PROBNP pg/mL  --   --  609.3  --      Estimated Creatinine Clearance: 55 mL/min (by C-G formula based on SCr of 0.65 mg/dL).    Microbiology Results Abnormal     Procedure Component Value - Date/Time    COVID PRE-OP / PRE-PROCEDURE SCREENING ORDER (NO ISOLATION) - Swab, Nasopharynx [[id number]]  (Normal) Collected: 04/17/21 2229    Lab Status: Final result Specimen: Swab from Nasopharynx Updated: 04/17/21 2325    Narrative:      The following orders were created for panel order COVID PRE-OP / PRE-PROCEDURE SCREENING ORDER (NO ISOLATION) - Swab, Nasopharynx.  Procedure                               Abnormality         Status                     ---------                               -----------         ------                     COVID-19,CEPHEID,ROHIT IN-...[[id number]]  Normal              Final result                 Please view results for these tests on the individual orders.    COVID-19,CEPHEID,ROHIT IN-HOUSE(OR EMERGENT/ADD-ON),NP SWAB IN TRANSPORT MEDIA 3-4 HR TAT - Swab, Nasopharynx [[id number]]  (Normal) Collected: 04/17/21 2229    Lab Status: Final result Specimen: Swab from Nasopharynx Updated: 04/17/21 2325     COVID19 Not Detected    Narrative:      Fact sheet for providers: https://www.fda.gov/media/298076/download     Fact sheet for patients: https://www.fda.gov/media/765173/download          Imaging Results (Last 24 Hours)     Procedure Component Value Units Date/Time    CT Angiogram Chest [[id number]] Collected: 04/17/21 2030     Updated: 04/17/21 2032    Narrative:      CTA Chest    INDICATION:   Metastatic disease in the lungs. Shortness of breath prior to arrival.    TECHNIQUE:   CT angiogram of the chest with 100 cc of Isovue-300 IV contrast. 3-D reconstructions were obtained and reviewed.   Radiation dose reduction techniques included automated exposure control or exposure modulation based on body size. Count of known CT  and  cardiac nuc med studies performed in previous 12 months: 3.     COMPARISON:   3-21    FINDINGS:   Chest images at mediastinal window show no pulmonary artery filling defects to suggest emboli. There is extensive mediastinal and hilar adenopathy especially the right hilum. The lymph nodes at the right hilum partially encase and narrow the segmental  pulmonary artery branch to the right middle lobe. No pleural or pericardial fluid is seen. There has been marked progression of metastatic disease in the chest since the previous examination. As a typical example, the diameter of the nodes at the right  hilum measures 3.5 cm compared with 2.8 cm on the previous exam. A node in the aortopulmonic window on the left measures 2.4 x 3 cm in transverse diameter compared with 0.8 x 2.8 cm on the previous exam.    Chest images at lung window show multiple lung masses, all of which are significantly larger since previous examination. As a typical example, there is a mass posteriorly in the left lower lobe measuring 2.3 x 2.7 cm. On the previous scan it measured 1.6  x 1.7 cm. Other mass lesions show similar progression.      Impression:      There is marked progression of metastatic disease in the chest since the previous examination. The segmental pulmonary artery to the right middle lobe is encased by tumor and narrowed as a result. This appears similar to the previous examination. No  evidence of pulmonary embolism.    Signer Name: Austin Sheets MD   Signed: 4/17/2021 8:30 PM   Workstation Name: [id number]    [facility]    XR Chest 1 View [[id number]] Collected: 04/17/21 1847     Updated: 04/17/21 1849    Narrative:         EXAMINATION: XR CHEST 1 VW-      INDICATION: Chest Pain triage protocol      COMPARISON: 01/11/2021     FINDINGS: Portable chest reveals cardiac and mediastinal silhouettes  within normal limits. Lung fields reveal interval improvement seen in  the appearance of the masslike  densities within the lung fields  bilaterally. The heart is upper limits of normal in size. There is no  pleural effusion or pneumothorax. Degenerative changes seen within the  spine. There are identified within the left tip in the SVC.       Impression:      Interval decrease seen in size of the masslike densities  within the lung fields bilaterally left greater than right.  Portacatheter identified on the left tip in the SVC. No superimposed  infectious process present.                Results for orders placed during the hospital encounter of 10/30/19    Adult Transthoracic Echo Complete W/ Cont if Necessary Per Protocol    Interpretation Summary  · Left ventricular systolic function is normal. Calculated EF = 58.0%. Estimated EF appears to be in the range of 56 - 60%.  · There is moderate aortic valve calcification. There is moderate aortic stenosis. Peak gradient 59 mmHg, mean gradient 32 mmHg. Dimensionless index 0.4. Calculated aortic valve area 1.13 cm²  · Mild to moderate aortic regurgitation with a pressure half-time of 577 ms. Vena contract of 0.4 cm  · Sigmoid-shaped ventricular septum is present  · Left ventricular diastolic (grade I) consistent with impaired relaxation.  · Mild tricuspid valve regurgitation is present. Estimated right ventricular systolic pressure from tricuspid regurgitation is mildly elevated (35-45 mmHg).      I have reviewed the medications:  Scheduled Meds:atorvastatin, 20 mg, Oral, Nightly  DULoxetine, 20 mg, Oral, Daily  ferrous sulfate, 325 mg, Oral, Daily With Breakfast  gabapentin, 300 mg, Oral, TID  insulin lispro, 0-7 Units, Subcutaneous, TID AC  oxybutynin XL, 5 mg, Oral, Daily  pantoprazole, 40 mg, Intravenous, Q12H  sodium chloride, 10 mL, Intravenous, Q12H      Continuous Infusions:sodium chloride, 75 mL/hr, Last Rate: 75 mL/hr (04/17/21 8038)      PRN Meds:.•  acetaminophen **OR** acetaminophen **OR** acetaminophen  •  albuterol  •  dextrose  •  dextrose  •  glucagon  (human recombinant)  •  HYDROcodone-acetaminophen  •  ondansetron **OR** ondansetron  •  sodium chloride  •  sodium chloride  •  sodium chloride    Assessment/Plan   Assessment & Plan     Active Hospital Problems    Diagnosis  POA   • Shortness of breath [R06.02]  Yes   • Thrombocytopenia (CMS/HCC) [D69.6]  Unknown      Resolved Hospital Problems   No resolved problems to display.        Brief Hospital Course to date:  Ele Strickland is a 71 y.o. female with known endometrial cancer currently being treated with chemo last being 4/7, presents with SOA on exertion and CP.     Gen weakness  Pancytopenia   - likely 2nd to recent chemo  - s/p 2 units of platelets and 2 units of PRBC   - Continue IVF   - Continue ferrous sulfate   - Holding Dyazide   - Holding ASA   - Repeat labs     Dyspnea   CP   - COVID neg; CTA no PE, progression of metastatic diease, segmental pulmonary artery to RML encased with tumor and narrowed - similar to previous   - Trop neg x 2; EKG reviewed   - Anemia also contributing   - Transfuse; duo-neb; monitor   - Continue PPI; tums ordered     Endometrial carcinoma, diagnosed in 2019, with mets to chest, hilar adenopathy   -follows up with Dr. Crowell   -s/p surgery, brachytherapy, s/p chemo-carbo/Taxol-last dose on 4/7/2021., Arimidex 1 mg daily, Decadron  -worsening metastatic dis in Lungs- Consult Dr. Crowell      GERD/achlasia-Nexium 40 mg at home  -Protonix bid, since pt is co of worsening reflux sx.  - Likely contributing to her CP      Neuropathy-Neurontin 300 mg every 8-ct.     DM 2-metformin 1000 mg every 12 hrs at home  - SSI - monitor      Bladder dysfunction-Ditropan 5 mg p.o. daily     Hyperlipidemia-Zocor 40 mg p.o. daily     Hypertension - holding dyazide      Smoker-nicotine, smoking cessation advice given     Aortic Stenosis- may need to repeat echo, if pt still fills poorly after hydration -- last ECHO 2019 with moderate stenosis        DVT prophylaxis:    -TEDs/SCDs    CODE  STATUS:   Code Status and Medical Interventions:   Ordered at: 04/17/21 2247     Level Of Support Discussed With:    Patient     Code Status:    CPR     Medical Interventions (Level of Support Prior to Arrest):    Full       Rupa Fernandez DO  04/18/21

## 2021-04-18 NOTE — THERAPY EVALUATION
Patient Name: Ele Strickland  : 1950    MRN: [id number]                              Today's Date: 2021       Admit Date: 2021    Visit Dx:     ICD-10-CM ICD-9-CM   1. Shortness of breath  R06.02 786.05   2. Malignant neoplasm metastatic to lung, unspecified laterality (CMS/[facility])  C78.00 197.0   3. Thrombocytopenia (CMS/[facility])  D69.6 287.5     Patient Active Problem List   Diagnosis   • Heart murmur   • Dyspnea on exertion   • Cardiomegaly   • Lower extremity edema   • Tobacco abuse   • Chronic GERD   • Chronic obstructive lung disease (CMS/HCC)   • Neuropathy   • Nicotine dependence   • Systolic murmur   • Type 2 diabetes mellitus without complication (CMS/[facility])   • Achalasia of esophagus   • Endometrial cancer (CMS/HCC)   • Anxiety and depression   • Dyslipidemia   • Metastasis to lung (CMS/HCC)   • Submassive hemoptysis   • Weight loss   • Disturbance in emotion   • Dehydration   • Encounter for care related to vascular access port   • Chemotherapy-induced fatigue   • Antineoplastic chemotherapy induced anemia   • Cellulitis   • Shortness of breath   • Thrombocytopenia (CMS/HCC)     Past Medical History:   Diagnosis Date   • Achalasia, esophageal    • Anxiety and depression    • Arthritis    • Chronic back pain    • Chronic bronchitis (CMS/[facility])    • COPD (chronic obstructive pulmonary disease) (CMS/[facility])     no home O2   • Diabetes (CMS/[facility])    • Endometrial cancer (CMS/HCC) 2019    Stage II   • Essential hypertension 2019   • Frequent urination    • Gall stones    • GERD (gastroesophageal reflux disease)    • Heart murmur    • High blood pressure    • History of brachytherapy 2020    vagina   • History of prolapse of bladder    • History of radiation therapy 2020    Pelvis   • Pinched nerve     back   • Spinal stenosis    • Wears dentures    • Wears glasses      Past Surgical History:   Procedure Laterality Date   • COLONOSCOPY  2020   • ENDOSCOPY     • EPIDURAL  2020   •  HELLER MYOTOMY LAPAROSCOPIC WITH ENDOSCOPY  2012   • TOTAL LAPAROSCOPIC HYSTERECTOMY SALPINGO OOPHORECTOMY Bilateral 11/15/2019    Procedure: TYPE 1 RADICAL TOTAL LAPAROSCOPIC HYSTERECTOMY BILATERAL SALPINGOOPHORECTOMY, LYSIS OF ADHESTIONS, BILATERAL PELVIC LYMPH NODE DISSECTION WITH DAVINCI ROBOT;  Surgeon: Zuly Evans MD;  Location: [facility];  Service: DaVinci   • UPPER GASTROINTESTINAL ENDOSCOPY  01/2020     General Information     Row Name 04/18/21 1215          OT Time and Intention    Document Type  evaluation  -JR     Mode of Treatment  occupational therapy  -JR     Row Name 04/18/21 1215          General Information    Patient Profile Reviewed  yes  -JR     Prior Level of Function  independent:;gait;transfer;bed mobility;ADL's;home management;driving;work  -JR     Existing Precautions/Restrictions  fall;other (see comments) low Hgb/Hct  -JR     Barriers to Rehab  medically complex  -JR     Row Name 04/18/21 1215          Living Environment    Lives With  alone  -JR     Row Name 04/18/21 1215          Home Main Entrance    Number of Stairs, Main Entrance  two  -JR     Row Name 04/18/21 1215          Cognition    Orientation Status (Cognition)  oriented x 3  -JR     Row Name 04/18/21 1215          Safety Issues, Functional Mobility    Safety Issues Affecting Function (Mobility)  awareness of need for assistance;insight into deficits/self-awareness  -     Impairments Affecting Function (Mobility)  balance;endurance/activity tolerance;strength;sensation/sensory awareness  -JR       User Key  (r) = Recorded By, (t) = Taken By, (c) = Cosigned By    Initials Name Provider Type    JR Merly Zhang OT Occupational Therapist          Mobility/ADL's     Row Name 04/18/21 1218          Bed Mobility    Comment (Bed Mobility)  Pt with low Hgb/Hct this date and Rn agreed to in bed therapy only this date  -JR     Row Name 04/18/21 1218          Activities of Daily Living    BADL Assessment/Intervention   grooming  -     Row Name 04/18/21 1218          Grooming Assessment/Training    Downs Level (Grooming)  wash face, hands;set up  -     Position (Grooming)  supine  -       User Key  (r) = Recorded By, (t) = Taken By, (c) = Cosigned By    Initials Name Provider Type    Merly Mcgarry OT Occupational Therapist        Obj/Interventions     Row Name 04/18/21 1219          Sensory Assessment (Somatosensory)    Sensory Assessment (Somatosensory)  right UE;left LE  -     Sensory Subjective Reports  numbness Pt reports numbness in B hands, R > L  -     Row Name 04/18/21 1219          Range of Motion Comprehensive    General Range of Motion  no range of motion deficits identified  -     Row Name 04/18/21 1219          Strength Comprehensive (MMT)    General Manual Muscle Testing (MMT) Assessment  upper extremity strength deficits identified  -     Comment, General Manual Muscle Testing (MMT) Assessment  B UE strength functionally 4/5  -     Row Name 04/18/21 1219          Upper Extremity (Manual Muscle Testing)    Comment, MMT: Upper Extremity  Pt refused any UE therex this date  -       User Key  (r) = Recorded By, (t) = Taken By, (c) = Cosigned By    Initials Name Provider Type    Merly Mcgarry, OT Occupational Therapist        Goals/Plan     Row Name 04/18/21 1225          Bed Mobility Goal 1 (OT)    Activity/Assistive Device (Bed Mobility Goal 1, OT)  bed mobility activities, all  -JR     Downs Level/Cues Needed (Bed Mobility Goal 1, OT)  contact guard assist;verbal cues required  -     Time Frame (Bed Mobility Goal 1, OT)  long term goal (LTG);1 week  -     Progress/Outcomes (Bed Mobility Goal 1, OT)  goal ongoing  -     Row Name 04/18/21 1225          Transfer Goal 1 (OT)    Activity/Assistive Device (Transfer Goal 1, OT)  transfers, all  -     Downs Level/Cues Needed (Transfer Goal 1, OT)  contact guard assist;verbal cues required  -     Time Frame  (Transfer Goal 1, OT)  long term goal (LTG);1 week  -JR     Progress/Outcome (Transfer Goal 1, OT)  goal ongoing  -     Row Name 04/18/21 1225          Toileting Goal 1 (OT)    Activity/Device (Toileting Goal 1, OT)  toileting skills, all  -JR     [place] Level/Cues Needed (Toileting Goal 1, OT)  standby assist;verbal cues required  -JR     Time Frame (Toileting Goal 1, OT)  long term goal (LTG);1 week  -JR     Progress/Outcome (Toileting Goal 1, OT)  goal ongoing  -     Row Name 04/18/21 1225          Strength Goal 1 (OT)    Strength Goal 1 (OT)  Pt to increase B UE strength by 1/2 muscle grade to support ADL independence.  -JR     Time Frame (Strength Goal 1, OT)  long term goal (LTG);1 week  -JR     Progress/Outcome (Strength Goal 1, OT)  goal ongoing  -     Row Name 04/18/21 1225          Therapy Assessment/Plan (OT)    Planned Therapy Interventions (OT)  activity tolerance training;adaptive equipment training;BADL retraining;functional balance retraining;occupation/activity based interventions;patient/caregiver education/training;ROM/therapeutic exercise;strengthening exercise;transfer/mobility retraining  -       User Key  (r) = Recorded By, (t) = Taken By, (c) = Cosigned By    Initials Name Provider Type    Merly Mcgarry, OT Occupational Therapist        Clinical Impression     Row Name 04/18/21 1222          Pain Assessment    Additional Documentation  Pain Scale: FACES Pre/Post-Treatment (Group)  -JR     Row Name 04/18/21 1222          Pain Scale: FACES Pre/Post-Treatment    Pain: FACES Scale, Pretreatment  2-->hurts little bit  -     Posttreatment Pain Rating  2-->hurts little bit  -     Row Name 04/18/21 1222          Plan of Care Review    Plan of Care Reviewed With  patient  -     Outcome Summary  OT initial eval and brief chart review completed. Pt presents with multiple comorbidities and decreased independence with ADL's and mobility. Pt may benefit from HH at d/c, but will  likely need assist at home as well.  -     Row Name 04/18/21 1222          Therapy Assessment/Plan (OT)    Patient/Family Therapy Goal Statement (OT)  Return home  -JR     Rehab Potential (OT)  fair, will monitor progress closely  -     Criteria for Skilled Therapeutic Interventions Met (OT)  yes;meets criteria;skilled treatment is necessary  -     Therapy Frequency (OT)  daily  -JR     Row Name 04/18/21 1222          Therapy Plan Review/Discharge Plan (OT)    Anticipated Discharge Disposition (OT)  home with assist;home with home health  -     Row Name 04/18/21 1222          Vital Signs    Pre Systolic BP Rehab  108  -JR     Pre Treatment Diastolic BP  53  -JR     Post Systolic BP Rehab  110  -JR     Post Treatment Diastolic BP  62  -JR     Pretreatment Heart Rate (beats/min)  78  -JR     Posttreatment Heart Rate (beats/min)  77  -JR     Pre SpO2 (%)  96  -JR     O2 Delivery Pre Treatment  room air  -JR     Post SpO2 (%)  96  -JR     O2 Delivery Post Treatment  room air  -JR     Pre Patient Position  Supine  -JR     Intra Patient Position  Supine  -JR     Post Patient Position  Supine  -JR     Row Name 04/18/21 1222          Positioning and Restraints    Pre-Treatment Position  in bed  -JR     Post Treatment Position  bed  -JR     In Bed  notified nsg;supine;call light within reach;encouraged to call for assist;exit alarm on  -JR       User Key  (r) = Recorded By, (t) = Taken By, (c) = Cosigned By    Initials Name Provider Type    Merly Mcgarry, OT Occupational Therapist        Outcome Measures     Row Name 04/18/21 1227          How much help from another is currently needed...    Putting on and taking off regular lower body clothing?  2  -JR     Bathing (including washing, rinsing, and drying)  2  -JR     Toileting (which includes using toilet bed pan or urinal)  2  -JR     Putting on and taking off regular upper body clothing  3  -JR     Taking care of personal grooming (such as brushing teeth)  4   -     Eating meals  4  -     AM-PAC 6 Clicks Score (OT)  17  -     Row Name 04/18/21 1227          Functional Assessment    Outcome Measure Options  AM-PAC 6 Clicks Daily Activity (OT)  -       User Key  (r) = Recorded By, (t) = Taken By, (c) = Cosigned By    Initials Name Provider Type     Merly Zhang, OT Occupational Therapist        Occupational Therapy Education                 Title: PT OT SLP Therapies (In Progress)     Topic: Occupational Therapy (In Progress)     Point: ADL training (In Progress)     Description:   Instruct learner(s) on proper safety adaptation and remediation techniques during self care or transfers.   Instruct in proper use of assistive devices.              Learning Progress Summary           Patient Acceptance, E, NR by  at 4/18/2021 1102    Comment: Educated pt regarding role of therapy and ongoing treatment plan.                   Point: Home exercise program (Not Started)     Description:   Instruct learner(s) on appropriate technique for monitoring, assisting and/or progressing therapeutic exercises/activities.              Learner Progress:  Not documented in this visit.          Point: Precautions (Not Started)     Description:   Instruct learner(s) on prescribed precautions during self-care and functional transfers.              Learner Progress:  Not documented in this visit.          Point: Body mechanics (Not Started)     Description:   Instruct learner(s) on proper positioning and spine alignment during self-care, functional mobility activities and/or exercises.              Learner Progress:  Not documented in this visit.                      User Key     Initials Effective Dates Name Provider Type [facility] 06/22/15 -  Merly Zhang OT Occupational Therapist OT              OT Recommendation and Plan  Planned Therapy Interventions (OT): activity tolerance training, adaptive equipment training, BADL retraining, functional balance retraining,  occupation/activity based interventions, patient/caregiver education/training, ROM/therapeutic exercise, strengthening exercise, transfer/mobility retraining  Therapy Frequency (OT): daily  Plan of Care Review  Plan of Care Reviewed With: patient  Outcome Summary: OT initial eval and brief chart review completed. Pt presents with multiple comorbidities and decreased independence with ADL's and mobility. Pt may benefit from HH at d/c, but will likely need assist at home as well.     Time Calculation:   Time Calculation- OT     Row Name 04/18/21 1228             Time Calculation- OT    OT Start Time  1102  -      OT Received On  04/18/21  -      OT Goal Re-Cert Due Date  04/28/21  -        User Key  (r) = Recorded By, (t) = Taken By, (c) = Cosigned By    Initials Name Provider Type     Merly Zhang OT Occupational Therapist        Therapy Charges for Today     Code Description Service Date Service Provider Modifiers Qty    [id number]  OT EVAL LOW COMPLEXITY 3 4/18/2021 Merly Zhang OT GO 1               Merly Zhang OT  4/18/2021

## 2021-04-19 PROBLEM — I35.0 AORTIC STENOSIS: Status: ACTIVE | Noted: 2021-01-01

## 2021-04-19 NOTE — PROGRESS NOTES
"Discharge Planning Assessment  [facility]     Patient Name: Ele Strickland  MRN: [id number]  Today's Date: 4/19/2021    Admit Date: 4/17/2021    Discharge Needs Assessment     Row Name 04/19/21 1023       Living Environment    Lives With  alone    Current Living Arrangements  home/apartment/condo    Primary Care Provided by  self    Provides Primary Care For  no one    Family Caregiver if Needed  child(bina), adult    Family Caregiver Names  closest relative is daughter in [place]    Quality of Family Relationships  helpful;involved    Able to Return to Prior Arrangements  yes       Resource/Environmental Concerns    Resource/Environmental Concerns  none    Transportation Concerns  car, none       Transition Planning    Patient/Family Anticipates Transition to  home    Patient/Family Anticipated Services at Transition  none    Transportation Anticipated  car, drives self       Discharge Needs Assessment    Readmission Within the Last 30 Days  no previous admission in last 30 days    Equipment Currently Used at Home  none    Concerns to be Addressed  discharge planning    Anticipated Changes Related to Illness  none    Equipment Needed After Discharge  none        Discharge Plan     Row Name 04/19/21 1024       Plan    Plan  Home at Discharge    Patient/Family in Agreement with Plan  yes    Plan Comments  Talked with patient at bedside - She confirmed she lives alone in [facility]. Spoke with her about PT recommendations for home health but patient refuses. She denies the use of any DME and states any walker or cane at home \"would just be in the way.\" She drove herself here and states she feels well enough to drive herself home. No discharge needs identified that patient will agree to. Goal is home at discharge - heladio [phone]    Final Discharge Disposition Code  01 - home or self-care        Continued Care and Services - Admitted Since 4/17/2021    Coordination has not been started for this encounter.   "       Demographic Summary     Row Name 04/19/21 1023       General Information    Admission Type  inpatient    Arrived From  home    Referral Source  admission list    Reason for Consult  discharge planning    Preferred Language  English     Used During This Interaction  no       Contact Information    Permission Granted to Share Info With          Functional Status     Row Name 04/19/21 1023       Functional Status    Usual Activity Tolerance  moderate    Current Activity Tolerance  moderate       Functional Status, IADL    Medications  independent    Meal Preparation  independent    Housekeeping  independent    Laundry  independent    Shopping  independent        Psychosocial    No documentation.       Abuse/Neglect    No documentation.       Legal    No documentation.       Substance Abuse    No documentation.       Patient Forms    No documentation.           Tri Marshall RN

## 2021-04-19 NOTE — PAYOR COMM NOTE
"Ref# [id number]  Martita Boyd RN, BSN  Phone # [phone]  Fax # [phone]  Ele Strickland (71 y.o. Female)     Date of Birth Social Security Number Address Home Phone MRN    1950  [id number] [address] [phone] [id number]    Protestant Marital Status          Scientologist        Admission Date Admission Type Admitting Provider Attending Provider Department, Room/Bed    21 Emergency Rupa Fernandez DO Roesch, Lyndsey, DO [facility] 6B, N630/1    Discharge Date Discharge Disposition Discharge Destination         Home or Self Care              Attending Provider: Rupa Fernandez DO    Allergies: Augmentin [Amoxicillin-pot Clavulanate], Biaxin [Clarithromycin]    Isolation: None   Infection: None   Code Status: CPR    Ht: 162.6 cm (64\")   Wt: 55.1 kg (121 lb 8 oz)    Admission Cmt: None   Principal Problem: None                Active Insurance as of 2021     Primary Coverage     Payor Plan Insurance Group Employer/Plan Group    On license of [facility] BLUE [id number]     Payor Plan Address Payor Plan Phone Number Payor Plan Fax Number Effective Dates    [address]   2004 - None Entered    [place] [address]       Subscriber Name Subscriber Birth Date Member ID       ELE STRICKLAND 1950 [id number]                      History & Physical      Pia William MD at 21 2138              [facility] Medicine Services  HISTORY AND PHYSICAL    Patient Name: Ele Strickland  : 1950  MRN: [id number]  Primary Care Physician: Sharon Acosta DO  Date of admission: 2021      Subjective   Subjective     Chief Complaint:  SOB    HPI:  Ele Strickland is a 71 y.o. female with history of known endometrial cancer, last chemo on 2021-since chemo patient has been feeling more weak-occ lightheadedness  , complaining of shortness of breath-with exertion , chronic no pedal edema, chronic cough, chills but no fever, " wheezing,    chest pain-burning sensation mid sternal area, running out of her Nexium, also L.side MSK like achy pain, worse with movement.  ,  Voice very hoarse-also has episodes of worsening hoarseness on off, coincides with her worsening reflux , no co of sore throat,     Denies bleeding per GI/Gu.  No N/V/D.  No new skin rash.  Chronic LLE-pain RX with excedrin and Advil prn.      Current COVID Risks are:  [] Fever []  Cough [] Shortness of breath [] Fatigue [] Change in taste or smell    [] Exposure to COVID positive patient  [] High risk facility   []  NONE  COVID VACCINE status  -not taken.      Review of Systems   Complete ROS done, which is negative except as above and HPI.  Old records reviewed and summarized in PM hx      Personal History     Past Medical History:   Endometrial carcinoma, diagnosed in 2019, with mets to chest, hilar adenopathy, -follows up with Dr. Crowell -s/p surgery, brachytherapy, s/p chemo-carbo/Taxol-last dose on 4/7/2021., Arimidex 1 mg daily, Decadron    CAD-aspirin 81 mg  GERD-Nexium 40 mg  Anemia-iron sulfate 325 mg daily  Neuropathy-Neurontin 300 mg every 8  DM 2-metformin 1000 mg every 12  Bladder dysfunction-Ditropan 5 mg p.o. daily  Hyperlipidemia-Zocor 40 mg p.o. daily  Hypertension-triamterene/HCTZ 37.5 mg daily.  Smoker  Anxiety/depression  Chronic back pain    Past Surgical History:   Procedure Laterality Date   • COLONOSCOPY  01/2020   • ENDOSCOPY     • EPIDURAL  02/2020   • HELLER MYOTOMY LAPAROSCOPIC WITH ENDOSCOPY  2012   • TOTAL LAPAROSCOPIC HYSTERECTOMY SALPINGO OOPHORECTOMY Bilateral 11/15/2019    Procedure: TYPE 1 RADICAL TOTAL LAPAROSCOPIC HYSTERECTOMY BILATERAL SALPINGOOPHORECTOMY, LYSIS OF ADHESTIONS, BILATERAL PELVIC LYMPH NODE DISSECTION WITH DAVINCI ROBOT;  Surgeon: Zuly Evans MD;  Location: [facility];  Service: [facility]   • UPPER GASTROINTESTINAL ENDOSCOPY  01/2020       Family History: family history includes Colon cancer in her cousin and  paternal aunt; Colon cancer (age of onset: 50) in her half-sister; Heart attack in her brother and mother; Hypertension in her daughter and son; Lung cancer in her brother; Uterine cancer (age of onset: 84) in her mother. Otherwise pertinent FHx was reviewed and unremarkable.     Social History:  reports that she has been smoking cigarettes. She has a 30.00 pack-year smoking history. She has never used smokeless tobacco. She reports that she does not drink alcohol and does not use drugs.      Medications:  Available home medication information reviewed.  Medications Prior to Admission   Medication Sig Dispense Refill Last Dose   • albuterol sulfate  (90 Base) MCG/ACT inhaler Inhale 2 puffs Every 4 (Four) Hours As Needed for Wheezing.      • [START ON 5/5/2021] anastrozole (ARIMIDEX) 1 MG tablet Take 1 tablet by mouth Daily. 30 tablet 5    • aspirin 81 MG EC tablet Take 81 mg by mouth Daily.      • Aspirin-Acetaminophen-Caffeine (EXCEDRIN PO) Take  by mouth As Needed.      • dexamethasone (DECADRON) 4 MG tablet Take 2 tablets in the morning daily on days 2, 3 & 4.  Take with food. 6 tablet 5    • dexamethasone (DECADRON) 4 MG tablet Take 5 tablets by mouth the night before chemo 30 tablet 0    • DULoxetine (CYMBALTA) 60 MG capsule TAKE 1 CAPSULE BY MOUTH ONCE DAILY FOR 90 DAYS  1    • esomeprazole (nexIUM) 40 MG capsule Take 1 capsule by mouth Every Morning Before Breakfast. 30 capsule 3    • ferrous sulfate 324 (65 Fe) MG tablet delayed-release EC tablet Take 324 mg by mouth Daily With Breakfast.      • gabapentin (NEURONTIN) 300 MG capsule Take 300 mg by mouth 3 (Three) Times a Day.  0    • lidocaine-prilocaine (EMLA) 2.5-2.5 % cream Apply  topically to the appropriate area as directed As Needed for Mild Pain  (prior to port access). 30 g 2    • loratadine (CLARITIN) 10 MG tablet Take 1 tablet by mouth Daily. 30 tablet 3    • metFORMIN (GLUCOPHAGE) 1000 MG tablet Take 1,000 mg by mouth 2 (Two) Times a Day.   1    • nystatin (MYCOSTATIN) 125851 UNIT/GM powder Apply  topically to the appropriate area as directed 3 (Three) Times a Day. 30 g 1    • ondansetron (ZOFRAN) 8 MG tablet Take 1 tablet by mouth 3 (Three) Times a Day As Needed for Nausea or Vomiting. 30 tablet 5    • oxybutynin XL (DITROPAN-XL) 5 MG 24 hr tablet Take 1 tablet by mouth Daily. 30 tablet 11    • promethazine (PHENERGAN) 25 MG tablet Take 1 tablet by mouth Every 6 (Six) Hours As Needed for Nausea or Vomiting. 30 tablet 5    • simvastatin (ZOCOR) 40 MG tablet Take 40 mg by mouth Daily.      • triamterene-hydrochlorothiazide (DYAZIDE) 37.5-25 MG per capsule Take 1 capsule by mouth Every Morning.  1        Allergies   Allergen Reactions   • Augmentin [Amoxicillin-Pot Clavulanate] Nausea And Vomiting   • Biaxin [Clarithromycin] Hallucinations       Objective   Objective     Vital Signs:   Temp:  [98.4 °F (36.9 °C)] 98.4 °F (36.9 °C)  Heart Rate:  [] 88  Resp:  [16] 16-96% RA  BP: (109-129)/(58-91) 118/74        Physical Exam     GENERAL- Not distressed, poorly nourished.  YD-ZIF-sjascc insp crackles.  CVS- s1s2 Regular,loud murmur-heard in all areas.  ABD- soft, nontender, not distended, no organomegaly.  EXT- no edema.  NEURO- AAO-3, power 5/5 in all ext, no gross sensory deficit, cranial nerves intact.  EYES- Conjunctivae are normal. Pupils are equal, round, and reactive to light. No scleral icterus.   ENT- no external ear nose lesions, mucosa moist.  NECK- No JVD present. No tracheal deviation present. No thyromegaly present,No cervical lymphadenopathy.  JOINTS/MSK- no deformity, no swelling.  SKIN- no rash , warm to touch.  PSYCHIATRIC- Normal mood and affect. Behavior is normal. Thought content normal.     Results Reviewed:  I have personally reviewed current lab and radiology data.    Results from last 7 days   Lab Units 04/17/21  1805   WBC 10*3/mm3 3.81   HEMOGLOBIN g/dL 8.7*   HEMATOCRIT % 26.8*   PLATELETS 10*3/mm3 11*     Results from last  7 days   Lab Units 04/17/21 1957 04/17/21  1805   SODIUM mmol/L  --  137   POTASSIUM mmol/L  --  4.2   CHLORIDE mmol/L  --  100   CO2 mmol/L  --  26.0   BUN mg/dL  --  42*   CREATININE mg/dL  --  0.76   GLUCOSE mg/dL  --  133*   CALCIUM mg/dL  --  9.5   ALT (SGPT) U/L  --  11   AST (SGOT) U/L  --  20   TROPONIN T ng/mL <0.010 <0.010   PROBNP pg/mL  --  609.3     Estimated Creatinine Clearance: 54.2 mL/min (by C-G formula based on SCr of 0.76 mg/dL).  Brief Urine Lab Results  (Last result in the past 365 days)      Color   Clarity   Blood   Leuk Est   Nitrite   Protein   CREAT   Urine HCG        01/04/21 1610 Yellow Clear Negative Negative Negative Negative             Imaging Results (Last 24 Hours)     Procedure Component Value Units Date/Time    CT Angiogram Chest [[id number]] Collected: 04/17/21 2030     Updated: 04/17/21 2032    Narrative:      CTA Chest    INDICATION:   Metastatic disease in the lungs. Shortness of breath prior to arrival.    TECHNIQUE:   CT angiogram of the chest with 100 cc of Isovue-300 IV contrast. 3-D reconstructions were obtained and reviewed.   Radiation dose reduction techniques included automated exposure control or exposure modulation based on body size. Count of known CT and  cardiac nuc med studies performed in previous 12 months: 3.     COMPARISON:   3-21    FINDINGS:   Chest images at mediastinal window show no pulmonary artery filling defects to suggest emboli. There is extensive mediastinal and hilar adenopathy especially the right hilum. The lymph nodes at the right hilum partially encase and narrow the segmental  pulmonary artery branch to the right middle lobe. No pleural or pericardial fluid is seen. There has been marked progression of metastatic disease in the chest since the previous examination. As a typical example, the diameter of the nodes at the right  hilum measures 3.5 cm compared with 2.8 cm on the previous exam. A node in the aortopulmonic window on the left  measures 2.4 x 3 cm in transverse diameter compared with 0.8 x 2.8 cm on the previous exam.    Chest images at lung window show multiple lung masses, all of which are significantly larger since previous examination. As a typical example, there is a mass posteriorly in the left lower lobe measuring 2.3 x 2.7 cm. On the previous scan it measured 1.6  x 1.7 cm. Other mass lesions show similar progression.      Impression:      There is marked progression of metastatic disease in the chest since the previous examination. The segmental pulmonary artery to the right middle lobe is encased by tumor and narrowed as a result. This appears similar to the previous examination. No  evidence of pulmonary embolism.    Signer Name: Austin Sheets MD   Signed: 4/17/2021 8:30 PM   Workstation Name: [id number]    [facility]    XR Chest 1 View [[id number]] Collected: 04/17/21 1847     Updated: 04/17/21 1849    Narrative:         EXAMINATION: XR CHEST 1 VW-      INDICATION: Chest Pain triage protocol      COMPARISON: 01/11/2021     FINDINGS: Portable chest reveals cardiac and mediastinal silhouettes  within normal limits. Lung fields reveal interval improvement seen in  the appearance of the masslike densities within the lung fields  bilaterally. The heart is upper limits of normal in size. There is no  pleural effusion or pneumothorax. Degenerative changes seen within the  spine. There are identified within the left tip in the SVC.       Impression:      Interval decrease seen in size of the masslike densities  within the lung fields bilaterally left greater than right.  Portacatheter identified on the left tip in the SVC. No superimposed  infectious process present.              Results for orders placed during the hospital encounter of 10/30/19    Adult Transthoracic Echo Complete W/ Cont if Necessary Per Protocol    Interpretation Summary  · Left ventricular systolic function is normal. Calculated EF =  58.0%. Estimated EF appears to be in the range of 56 - 60%.  · There is moderate aortic valve calcification. There is moderate aortic stenosis. Peak gradient 59 mmHg, mean gradient 32 mmHg. Dimensionless index 0.4. Calculated aortic valve area 1.13 cm²  · Mild to moderate aortic regurgitation with a pressure half-time of 577 ms. Vena contract of 0.4 cm  · Sigmoid-shaped ventricular septum is present  · Left ventricular diastolic (grade I) consistent with impaired relaxation.  · Mild tricuspid valve regurgitation is present. Estimated right ventricular systolic pressure from tricuspid regurgitation is mildly elevated (35-45 mmHg).    Troponin-less than 0.01, proBNP 600  Sodium 137, BUN/creatinine 42/0.7, LFTs-WNL, lipase-30,  WBC-4, hemoglobin of 9 (hemoglobin-9.5), platelets 11,  Ekg-  Cxr-increased masslike density bilateral lungs, CT angio chest  Extensive mediastinal/hilar adenopathy encasing the segmental pulmonary artery branch to right middle lobe with increased metastatic disease,.  EKG sinus rhythm 100 bpm,  no acute ST-T wave changes.    Assessment/Plan   Active Hospital Problems    Diagnosis  POA   • Shortness of breath [R06.02]  Yes   • Thrombocytopenia (CMS/HCC) [D69.6]  Unknown       Assessment & Plan   Gen weakness, pancytopenia-likely 2nd to recent chemo.  -transfuse plts, monitor h/h closely  -iv fluids for dehydration      Endometrial carcinoma, diagnosed in 2019, with mets to chest, hilar adenopathy, -follows up with Dr. Crowell -s/p surgery, brachytherapy, s/p chemo-carbo/Taxol-last dose on 4/7/2021., Arimidex 1 mg daily, Decadron  -worsening metastatic dis in Lungs-consult Dr. High in am-for further wup.      GERD/achlasia-Nexium 40 mg-Protonix bid, since pt is co of worsening reflux sx.    Anemia/thrombocytopenia, likely from chemo -iron sulfate 325 mg daily-as abouve.    Neuropathy-Neurontin 300 mg every 8-ct.    DM 2-metformin 1000 mg every 12-f's coverage.    Bladder  dysfunction-Ditropan 5 mg p.o. daily    Hyperlipidemia-Zocor 40 mg p.o. daily    Hypertension-triamterene/HCTZ 37.5 mg daily.-currently hypotensive, therefore hold.    Smoker-nicotine, smoking cessation advice given. Dry cough, but not wheezing, ct nebs, COVID test still pending.    Aortic Stenosis- may need to repeat echo, if pt still fills poorly after hydration      DVT prophylaxis:    -TEDs/SCDs  -Lovenox-none 2nd to thrombocytopenia.    CODE STATUS:    Code Status and Medical Interventions:   Ordered at: 04/17/21 5677     Level Of Support Discussed With:    Patient     Code Status:    CPR     Medical Interventions (Level of Support Prior to Arrest):    Full         Admission Status:  I believe this patient meets observation criteria.      Electronically signed by Pia William MD at 04/17/21 8451          Emergency Department Notes      David Young PA at 04/17/21 1940          Subjective   Ms. Strickland is a 71-year-old female comes the emergency department today complaining of having chest pain and increasing shortness of breath.  She reports that she has endometrial cancer with metastasis to her lungs.  She reports that over the past few days she been having increasing shortness of breath she sees Dr. Zuly Crowell and has been receiving chemotherapy.  She reports that elastic CT scan revealed some may be possible mild improvement definitely nothing getting worse.  She reports that she had no productive cough no fevers no chills.  She had chest pain that is been sharp and stabbing the anterior chest wall area.  She had no falls no trauma.  She denies loss of smell or taste.  She has had known exposure to the Covid virus.      History provided by:  Patient   used: No    Shortness of Breath  Severity:  Moderate  Onset quality:  Gradual  Duration:  2 days  Timing:  Constant  Progression:  Worsening  Chronicity: Chronic shortness of breath of COPD but worsening over the past 2  days.  Context: activity    Context: not fumes, not smoke exposure and not URI    Context comment:  Known mets from endometrial cancer.  Currently receiving chemotherapy.  Relieved by:  Rest  Worsened by:  Activity and exertion  Ineffective treatments:  None tried  Associated symptoms: cough and wheezing    Associated symptoms: no abdominal pain, no chest pain, no claudication, no diaphoresis, no ear pain, no fever, no hemoptysis, no neck pain, no PND, no rash and no sputum production    Risk factors: hx of cancer    Risk factors: no recent alcohol use, no family hx of DVT and no hx of PE/DVT        Review of Systems   Constitutional: Negative for diaphoresis and fever.   HENT: Negative for ear pain.    Respiratory: Positive for cough, shortness of breath and wheezing. Negative for hemoptysis and sputum production.    Cardiovascular: Negative for chest pain, palpitations, claudication and PND.   Gastrointestinal: Negative for abdominal pain.   Genitourinary: Negative for dysuria, frequency and urgency.   Musculoskeletal: Negative for back pain and neck pain.   Skin: Negative for rash.   Psychiatric/Behavioral: Negative.    All other systems reviewed and are negative.      Past Medical History:   Diagnosis Date   • Achalasia, esophageal    • Anxiety and depression    • Arthritis    • Chronic back pain    • Chronic bronchitis (CMS/HCC)    • COPD (chronic obstructive pulmonary disease) (CMS/HCC)     no home O2   • Diabetes (CMS/HCC)    • Endometrial cancer (CMS/HCC) 11/2019    Stage II   • Essential hypertension 7/5/2019   • Frequent urination    • Gall stones    • GERD (gastroesophageal reflux disease)    • Heart murmur    • High blood pressure    • History of brachytherapy 03/11/2020    vagina   • History of prolapse of bladder    • History of radiation therapy 03/03/2020    Pelvis   • Pinched nerve     back   • Spinal stenosis    • Wears dentures    • Wears glasses        Allergies   Allergen Reactions   • Augmentin  [Amoxicillin-Pot Clavulanate] Nausea And Vomiting   • Biaxin [Clarithromycin] Hallucinations       Past Surgical History:   Procedure Laterality Date   • COLONOSCOPY  01/2020   • ENDOSCOPY     • EPIDURAL  02/2020   • HELLER MYOTOMY LAPAROSCOPIC WITH ENDOSCOPY  2012   • TOTAL LAPAROSCOPIC HYSTERECTOMY SALPINGO OOPHORECTOMY Bilateral 11/15/2019    Procedure: TYPE 1 RADICAL TOTAL LAPAROSCOPIC HYSTERECTOMY BILATERAL SALPINGOOPHORECTOMY, LYSIS OF ADHESTIONS, BILATERAL PELVIC LYMPH NODE DISSECTION WITH DAVINCI ROBOT;  Surgeon: Zuly Evans MD;  Location: [facility];  Service: [facility]   • UPPER GASTROINTESTINAL ENDOSCOPY  01/2020       Family History   Problem Relation Age of Onset   • Heart attack Mother    • Uterine cancer Mother 84   • Lung cancer Brother    • Colon cancer Half-Sister 50   • Colon cancer Paternal Aunt    • Colon cancer Cousin    • Heart attack Brother    • Hypertension Daughter    • Hypertension Son        Social History     Socioeconomic History   • Marital status:      Spouse name: Not on file   • Number of children: 2   • Years of education: Not on file   • Highest education level: Not on file   Tobacco Use   • Smoking status: Current Every Day Smoker     Packs/day: 1.00     Years: 30.00     Pack years: 30.00     Types: Cigarettes   • Smokeless tobacco: Never Used   Vaping Use   • Vaping Use: Former   • Substances: Nicotine, Flavoring   • Devices: Pre-filled or refillable cartridge, Pre-filled pod   Substance and Sexual Activity   • Alcohol use: No   • Drug use: No   • Sexual activity: Defer           Objective   Physical Exam  Vitals and nursing note reviewed.   Constitutional:       General: She is not in acute distress.     Appearance: She is well-developed. She is not diaphoretic.      Comments: Cachectic smiling nontoxic   HENT:      Head: Normocephalic and atraumatic.      Nose: Nose normal.   Eyes:      General: No scleral icterus.     Conjunctiva/sclera: Conjunctivae normal.      Cardiovascular:      Rate and Rhythm: Normal rate and regular rhythm.      Heart sounds: Normal heart sounds. No murmur heard.     Pulmonary:      Effort: Pulmonary effort is normal. No respiratory distress.      Breath sounds: Examination of the right-lower field reveals wheezing. Examination of the left-lower field reveals wheezing. Decreased breath sounds and wheezing present.   Abdominal:      General: Bowel sounds are normal.      Palpations: Abdomen is soft.      Tenderness: There is no abdominal tenderness.   Musculoskeletal:         General: Normal range of motion.      Cervical back: Normal range of motion and neck supple.   Skin:     General: Skin is warm and dry.   Neurological:      Mental Status: She is alert and oriented to person, place, and time.   Psychiatric:         Behavior: Behavior normal.         Procedures          ED Course  ED Course as of Apr 18 2341   Sat Apr 17, 2021 2054 Spoke to Dr. Crowell who is managing her cancer.  She would like the hospitalist to admit her she will see her in the morning.  She is concerned about the low platelet count and the progression of her tumors.    [AURA]   2200 Discussed the findings with the patient and Dr. Webber she is agreed to admit the patient.    [AURA]      ED Course User Index  [AURA] David Young PA                                 Recent Results (from the past 24 hour(s))   Prepare Platelet Pheresis, 2 Units    Collection Time: 04/18/21  3:36 AM   Result Value Ref Range    Product Code M1627Y55     Unit Number [id number]     UNIT  ABO A     UNIT  RH POS     Dispense Status IS     Blood Expiration Date 202104202359     Blood Type Barcode 6200     Product Code B9397O58     Unit Number [id number]     UNIT  ABO AB     UNIT  RH POS     Dispense Status IS     Blood Expiration Date 202104202359     Blood Type Barcode 8400    Comprehensive Metabolic Panel    Collection Time: 04/18/21  6:26 AM    Specimen: Blood   Result Value Ref Range     Glucose 77 65 - 99 mg/dL    BUN 37 (H) 8 - 23 mg/dL    Creatinine 0.65 0.57 - 1.00 mg/dL    Sodium 140 136 - 145 mmol/L    Potassium 3.4 (L) 3.5 - 5.2 mmol/L    Chloride 104 98 - 107 mmol/L    CO2 27.0 22.0 - 29.0 mmol/L    Calcium 8.8 8.6 - 10.5 mg/dL    Total Protein 5.6 (L) 6.0 - 8.5 g/dL    Albumin 3.20 (L) 3.50 - 5.20 g/dL    ALT (SGPT) 12 1 - 33 U/L    AST (SGOT) 17 1 - 32 U/L    Alkaline Phosphatase 72 39 - 117 U/L    Total Bilirubin 0.3 0.0 - 1.2 mg/dL    eGFR Non African Amer 90 >60 mL/min/1.73    Globulin 2.4 gm/dL    A/G Ratio 1.3 g/dL    BUN/Creatinine Ratio 56.9 (H) 7.0 - 25.0    Anion Gap 9.0 5.0 - 15.0 mmol/L   Protime-INR    Collection Time: 04/18/21  6:26 AM    Specimen: Blood   Result Value Ref Range    Protime 12.7 11.4 - 14.4 Seconds    INR 0.98 0.85 - 1.16   Ferritin    Collection Time: 04/18/21  6:26 AM    Specimen: Blood   Result Value Ref Range    Ferritin 499.80 (H) 13.00 - 150.00 ng/mL   Iron Profile    Collection Time: 04/18/21  6:26 AM    Specimen: Blood   Result Value Ref Range    Iron 94 37 - 145 mcg/dL    Iron Saturation 44 20 - 50 %    Transferrin 143 (L) 200 - 360 mg/dL    TIBC 213 (L) 298 - 536 mcg/dL   Magnesium    Collection Time: 04/18/21  6:26 AM    Specimen: Blood   Result Value Ref Range    Magnesium 1.5 (L) 1.6 - 2.4 mg/dL   Phosphorus    Collection Time: 04/18/21  6:26 AM    Specimen: Blood   Result Value Ref Range    Phosphorus 3.0 2.5 - 4.5 mg/dL   CBC Auto Differential    Collection Time: 04/18/21  6:26 AM    Specimen: Blood   Result Value Ref Range    WBC 1.77 (C) 3.40 - 10.80 10*3/mm3    RBC 2.07 (L) 3.77 - 5.28 10*6/mm3    Hemoglobin 6.1 (C) 12.0 - 15.9 g/dL    Hematocrit 19.3 (C) 34.0 - 46.6 %    MCV 93.2 79.0 - 97.0 fL    MCH 29.5 26.6 - 33.0 pg    MCHC 31.6 31.5 - 35.7 g/dL    RDW 17.2 (H) 12.3 - 15.4 %    RDW-SD 57.7 (H) 37.0 - 54.0 fl    MPV 9.9 6.0 - 12.0 fL    Platelets 113 (L) 140 - 450 10*3/mm3    Neutrophil % 59.9 42.7 - 76.0 %    Lymphocyte % 29.9 19.6 -  45.3 %    Monocyte % 9.6 5.0 - 12.0 %    Eosinophil % 0.0 (L) 0.3 - 6.2 %    Basophil % 0.0 0.0 - 1.5 %    Immature Grans % 0.6 (H) 0.0 - 0.5 %    Neutrophils, Absolute 1.06 (L) 1.70 - 7.00 10*3/mm3    Lymphocytes, Absolute 0.53 (L) 0.70 - 3.10 10*3/mm3    Monocytes, Absolute 0.17 0.10 - 0.90 10*3/mm3    Eosinophils, Absolute 0.00 0.00 - 0.40 10*3/mm3    Basophils, Absolute 0.00 0.00 - 0.20 10*3/mm3    Immature Grans, Absolute 0.01 0.00 - 0.05 10*3/mm3    nRBC 0.0 0.0 - 0.2 /100 WBC   aPTT    Collection Time: 04/18/21  6:26 AM    Specimen: Blood   Result Value Ref Range    PTT 31.1 22.0 - 39.0 seconds   Scan Slide    Collection Time: 04/18/21  6:26 AM    Specimen: Blood   Result Value Ref Range    Hypochromia Slight/1+ None Seen    WBC Morphology Normal Normal    Platelet Morphology Normal Normal   POC Glucose Once    Collection Time: 04/18/21  7:36 AM    Specimen: Blood   Result Value Ref Range    Glucose 92 70 - 130 mg/dL   POC Glucose Once    Collection Time: 04/18/21 11:18 AM    Specimen: Blood   Result Value Ref Range    Glucose 199 (H) 70 - 130 mg/dL   Prepare RBC, 2 Units    Collection Time: 04/18/21  1:49 PM   Result Value Ref Range    Product Code D2802W55     Unit Number [id number]     UNIT  ABO A     UNIT  RH NEG     Crossmatch Interpretation Compatible     Dispense Status IS     Blood Expiration Date 202104272359     Blood Type Barcode 0600     Product Code D5363S82     Unit Number [id number]     UNIT  ABO A     UNIT  RH NEG     Crossmatch Interpretation Compatible     Dispense Status IS     Blood Expiration Date 202104272359     Blood Type Barcode 0600    Potassium    Collection Time: 04/18/21  3:51 PM    Specimen: Blood   Result Value Ref Range    Potassium 4.7 3.5 - 5.2 mmol/L   POC Glucose Once    Collection Time: 04/18/21  5:54 PM    Specimen: Blood   Result Value Ref Range    Glucose 160 (H) 70 - 130 mg/dL   POC Glucose Once    Collection Time: 04/18/21  8:09 PM    Specimen: Blood      Result Value Ref Range    Glucose 172 (H) 70 - 130 mg/dL     Note: In addition to lab results from this visit, the labs listed above may include labs taken at another facility or during a different encounter within the last 24 hours. Please correlate lab times with ED admission and discharge times for further clarification of the services performed during this visit.    CT Angiogram Chest   Final Result   There is marked progression of metastatic disease in the chest since the previous examination. The segmental pulmonary artery to the right middle lobe is encased by tumor and narrowed as a result. This appears similar to the previous examination. No   evidence of pulmonary embolism.      Signer Name: Austin Sheets MD    Signed: 4/17/2021 8:30 PM    Workstation Name: [id number]     [facility]      XR Chest 1 View   Final Result   Interval decrease seen in size of the masslike densities   within the lung fields bilaterally, left greater than right.   Portacatheter identified on the left with tip in the SVC. No   superimposed infectious process present.       DICTATED:   04/17/2021   EDITED/ls :   04/18/2021       This report was finalized on 4/18/2021 11:53 AM by Dr. Kalani Laurent MD.            Vitals:    04/18/21 2009 04/18/21 2050 04/18/21 2100 04/18/21 2300   BP:       BP Location:       Patient Position:       Pulse: 76 82 83 77   Resp: 16      Temp:       TempSrc:       SpO2: 99%      Weight:       Height:         Medications   sodium chloride 0.9 % flush 10 mL (has no administration in time range)   sodium chloride 0.9 % flush 10 mL (has no administration in time range)   albuterol (PROVENTIL) nebulizer solution 0.083% 2.5 mg/3mL (has no administration in time range)   gabapentin (NEURONTIN) capsule 300 mg (300 mg Oral Given 4/18/21 2103)   DULoxetine (CYMBALTA) DR capsule 20 mg (20 mg Oral Given 4/18/21 0826)   atorvastatin (LIPITOR) tablet 20 mg (20 mg Oral Given 4/18/21  2103)   ferrous sulfate tablet 325 mg (325 mg Oral Given 4/18/21 0826)   oxybutynin XL (DITROPAN-XL) 24 hr tablet 5 mg (5 mg Oral Given 4/18/21 0826)   sodium chloride 0.9 % flush 10 mL (10 mL Intravenous Given 4/18/21 2104)   sodium chloride 0.9 % flush 1-10 mL (has no administration in time range)   acetaminophen (TYLENOL) tablet 650 mg (650 mg Oral Given 4/18/21 0827)     Or   acetaminophen (TYLENOL) 160 MG/5ML solution 650 mg ( Oral Not Given:  See Alt 4/18/21 0827)     Or   acetaminophen (TYLENOL) suppository 650 mg ( Rectal Not Given:  See Alt 4/18/21 0827)   ondansetron (ZOFRAN) tablet 4 mg (4 mg Oral Given 4/18/21 0239)     Or   ondansetron (ZOFRAN) injection 4 mg ( Intravenous Not Given:  See Alt 4/18/21 0239)   dextrose (GLUTOSE) oral gel 15 g (has no administration in time range)   dextrose (D50W) 25 g/ 50mL Intravenous Solution 25 g (has no administration in time range)   glucagon (human recombinant) (GLUCAGEN DIAGNOSTIC) injection 1 mg (has no administration in time range)   HYDROcodone-acetaminophen (NORCO) 5-325 MG per tablet 1 tablet (1 tablet Oral Given 4/18/21 2102)   insulin lispro (humaLOG) injection 0-7 Units (2 Units Subcutaneous Given 4/18/21 1803)   pantoprazole (PROTONIX) injection 40 mg (40 mg Intravenous Given 4/18/21 2102)   sodium chloride 0.9 % infusion (75 mL/hr Intravenous New Bag 4/18/21 1657)   potassium chloride (MICRO-K) CR capsule 40 mEq (40 mEq Oral Given 4/18/21 1145)     Or   potassium chloride (KLOR-CON) packet 40 mEq ( Oral Not Given:  See Alt 4/18/21 1145)     Or   potassium chloride 10 mEq in 100 mL IVPB ( Intravenous Not Given:  See Alt 4/18/21 1145)   Magnesium Sulfate 2 gram Bolus, followed by 8 gram infusion (total Mg dose 10 grams)- Mg less than or equal to 1mg/dL ( Intravenous Not Given:  See Alt 4/18/21 1344)     Or   Magnesium Sulfate 2 gram / 50mL Infusion (GIVE X 3 BAGS TO EQUAL 6GM TOTAL DOSE) - Mg 1.1 - 1.5 mg/dl (2 g Intravenous New Bag 4/18/21 1344)     Or      Magnesium Sulfate 4 gram infusion- Mg 1.6-1.9 mg/dL ( Intravenous Not Given:  See Alt 4/18/21 1344)   ipratropium-albuterol (DUO-NEB) nebulizer solution 3 mL (3 mL Nebulization Given 4/18/21 2009)   calcium carbonate (TUMS) chewable tablet 500 mg (200 mg elemental) (has no administration in time range)   promethazine (PHENERGAN) tablet 6.25 mg (6.25 mg Oral Given 4/18/21 2103)   albuterol sulfate HFA (PROVENTIL HFA;VENTOLIN HFA;PROAIR HFA) inhaler 2 puff (2 puffs Inhalation Given 4/17/21 2021)   iopamidol (ISOVUE-370) 76 % injection 100 mL (64 mL Intravenous Given 4/17/21 2015)     ECG/EMG Results (last 24 hours)     Procedure Component Value Units Date/Time    ECG 12 Lead [[id number]] Collected: 04/17/21 1735     Updated: 04/17/21 1736        ECG 12 Lead   Final Result   Test Reason : chest pain   Blood Pressure : **/** mmHG   Vent. Rate : 100 BPM     Atrial Rate : 100 BPM      P-R Int : 122 ms          QRS Dur : 072 ms       QT Int : 336 ms       P-R-T Axes : 063 -01 057 degrees      QTc Int : 433 ms      Normal sinus rhythm   Normal ECG   When compared with ECG of 16-JAN-2012 13:47,   Vent. rate has increased BY  38 BPM   Confirmed by MATTHEW INGRAM ([id number]) on 4/18/2021 2:25:40 AM      Referred By:  EDMD           Confirmed By:MATTHEW INGRAM                    MDM  Number of Diagnoses or Management Options  Malignant neoplasm metastatic to lung, unspecified laterality (CMS/HCC): established and worsening  Shortness of breath: established and worsening  Thrombocytopenia (CMS/HCC): new and requires workup     Amount and/or Complexity of Data Reviewed  Clinical lab tests: ordered and reviewed  Tests in the radiology section of CPT®: ordered and reviewed  Tests in the medicine section of CPT®: reviewed and ordered  Decide to obtain previous medical records or to obtain history from someone other than the patient: yes  Discuss the patient with other providers: yes    Patient Progress  Patient progress:  stable      Final diagnoses:   Shortness of breath   Malignant neoplasm metastatic to lung, unspecified laterality (CMS/HCC)   Thrombocytopenia (CMS/HCC)       ED Disposition  ED Disposition     ED Disposition Condition Comment    Decision to Admit  Level of Care: Telemetry [5]   Diagnosis: Shortness of breath [786.05.ICD-9-CM]   Admitting Physician: PADMINI WALLACE [[id number]]   Attending Physician: PADMINI WALLACE [[id number]]   Isolate for COVID?: No [0]   Bed Request Comments: tele   Certification: I Certify That Inpatient Hospital Services Are Medically Necessary For Greater Than 2 Midnights            No follow-up provider specified.       Medication List      No changes were made to your prescriptions during this visit.          David Young PA  04/18/21 2341      Electronically signed by David Young PA at 04/18/21 2341         Current Facility-Administered Medications   Medication Dose Route Frequency Provider Last Rate Last Admin   • acetaminophen (TYLENOL) tablet 650 mg  650 mg Oral Q4H PRN Padmini Wallace MD   650 mg at 04/18/21 0827    Or   • acetaminophen (TYLENOL) 160 MG/5ML solution 650 mg  650 mg Oral Q4H PRN Padmini Wallace MD        Or   • acetaminophen (TYLENOL) suppository 650 mg  650 mg Rectal Q4H PRN Padmini Wallace MD       • albuterol (PROVENTIL) nebulizer solution 0.083% 2.5 mg/3mL  2.5 mg Nebulization Q4H PRN Padmini Wallace MD       • atorvastatin (LIPITOR) tablet 20 mg  20 mg Oral Nightly Padmini Wallace MD   20 mg at 04/18/21 2103   • calcium carbonate (TUMS) chewable tablet 500 mg (200 mg elemental)  2 tablet Oral TID PRN Rupa Fernandez DO       • dextrose (D50W) 25 g/ 50mL Intravenous Solution 25 g  25 g Intravenous Q15 Min PRN Padmini Wallace MD       • dextrose (GLUTOSE) oral gel 15 g  15 g Oral Q15 Min PRN Padmini Wallace MD       • DULoxetine (CYMBALTA) DR capsule 20 mg  20 mg Oral Daily Padmini Wallace MD   20 mg at 04/19/21 1010   • ferrous sulfate tablet 325 mg   325 mg Oral Daily With Breakfast Pia William MD   325 mg at 04/19/21 1010   • gabapentin (NEURONTIN) capsule 300 mg  300 mg Oral TID Pia William MD   300 mg at 04/19/21 1009   • glucagon (human recombinant) (GLUCAGEN DIAGNOSTIC) injection 1 mg  1 mg Subcutaneous Q15 Min PRN Pia William MD       • HYDROcodone-acetaminophen (NORCO) 5-325 MG per tablet 1 tablet  1 tablet Oral Q4H PRN Pia William MD   1 tablet at 04/19/21 1207   • insulin lispro (humaLOG) injection 0-7 Units  0-7 Units Subcutaneous TID AC Pia William MD   2 Units at 04/18/21 1803   • ipratropium-albuterol (DUO-NEB) nebulizer solution 3 mL  3 mL Nebulization 4x Daily - RT Rupa Fernandez DO   3 mL at 04/19/21 1144   • Magnesium Sulfate 2 gram Bolus, followed by 8 gram infusion (total Mg dose 10 grams)- Mg less than or equal to 1mg/dL  2 g Intravenous PRN Rupa Fernandez DO        Or   • Magnesium Sulfate 2 gram / 50mL Infusion (GIVE X 3 BAGS TO EQUAL 6GM TOTAL DOSE) - Mg 1.1 - 1.5 mg/dl  2 g Intravenous PRN Rupa Fernandez DO 25 mL/hr at 04/18/21 1344 2 g at 04/18/21 1344    Or   • Magnesium Sulfate 4 gram infusion- Mg 1.6-1.9 mg/dL  4 g Intravenous PRN Rupa Fernandez DO       • ondansetron (ZOFRAN) tablet 4 mg  4 mg Oral Q6H PRN Pia William MD   4 mg at 04/18/21 0239    Or   • ondansetron (ZOFRAN) injection 4 mg  4 mg Intravenous Q6H PRN Pia William MD       • oxybutynin XL (DITROPAN-XL) 24 hr tablet 5 mg  5 mg Oral Daily Pia William MD   5 mg at 04/19/21 1010   • pantoprazole (PROTONIX) EC tablet 40 mg  40 mg Oral BID Rupa Phillip DO   40 mg at 04/19/21 1009   • potassium chloride (MICRO-K) CR capsule 40 mEq  40 mEq Oral PRN Rupa Fernandez,    40 mEq at 04/18/21 1145    Or   • potassium chloride (KLOR-CON) packet 40 mEq  40 mEq Oral PRN Rupa Fernandez,         Or   • potassium chloride 10 mEq in 100 mL IVPB  10 mEq Intravenous Q1H PRN Rupa Fernandez,        • promethazine (PHENERGAN) tablet  6.25 mg  6.25 mg Oral Q6H PRN Rupa Fernandez,    6.25 mg at 04/19/21 1207   • sodium chloride 0.9 % flush 1-10 mL  1-10 mL Intravenous PRN Pia William MD       • sodium chloride 0.9 % flush 10 mL  10 mL Intravenous PRN Pia William MD       • sodium chloride 0.9 % flush 10 mL  10 mL Intravenous PRN Pia William MD       • sodium chloride 0.9 % flush 10 mL  10 mL Intravenous Q12H Pia William MD   10 mL at 04/18/21 2104     Lab Results (last 72 hours)     Procedure Component Value Units Date/Time    POC Glucose Once [[id number]]  (Abnormal) Collected: 04/19/21 1222    Specimen: Blood Updated: 04/19/21 1224     Glucose 137 mg/dL     CBC & Differential [[id number]]  (Abnormal) Collected: 04/19/21 0758    Specimen: Blood Updated: 04/19/21 1013    Narrative:      The following orders were created for panel order CBC & Differential.  Procedure                               Abnormality         Status                     ---------                               -----------         ------                     Scan Slide[[id number]]                   Normal              Final result               CBC Auto Differential[[id number]]        Abnormal            Final result                 Please view results for these tests on the individual orders.    Scan Slide [[id number]]  (Normal) Collected: 04/19/21 0758    Specimen: Blood Updated: 04/19/21 1013     RBC Morphology Normal     WBC Morphology Normal     Platelet Morphology Normal    CBC Auto Differential [[id number]]  (Abnormal) Collected: 04/19/21 0758    Specimen: Blood Updated: 04/19/21 1013     WBC 1.53 10*3/mm3      RBC 2.91 10*6/mm3      Hemoglobin 8.5 g/dL      Hematocrit 26.9 %      MCV 92.4 fL      MCH 29.2 pg      MCHC 31.6 g/dL      RDW 17.3 %      RDW-SD 57.2 fl      MPV 10.2 fL      Platelets 79 10*3/mm3      Neutrophil % 66.5 %      Lymphocyte % 21.6 %      Monocyte % 8.5 %      Eosinophil % 0.7 %      Basophil % 0.7 %      Immature Grans % 2.0 %       Neutrophils, Absolute 1.02 10*3/mm3      Lymphocytes, Absolute 0.33 10*3/mm3      Monocytes, Absolute 0.13 10*3/mm3      Eosinophils, Absolute 0.01 10*3/mm3      Basophils, Absolute 0.01 10*3/mm3      Immature Grans, Absolute 0.03 10*3/mm3      nRBC 0.0 /100 WBC     Narrative:      Verified by repeat analysis.    Appended report. These results have been appended to a previously verified report.    Basic Metabolic Panel [[id number]]  (Abnormal) Collected: 04/19/21 0600    Specimen: Blood Updated: 04/19/21 0815     Glucose 112 mg/dL      BUN 22 mg/dL      Creatinine 0.81 mg/dL      Sodium 139 mmol/L      Potassium 4.2 mmol/L      Chloride 107 mmol/L      CO2 22.0 mmol/L      Calcium 8.1 mg/dL      eGFR Non African Amer 70 mL/min/1.73      BUN/Creatinine Ratio 27.2     Anion Gap 10.0 mmol/L     Narrative:      GFR Normal >60  Chronic Kidney Disease <60  Kidney Failure <15      Magnesium [[id number]]  (Normal) Collected: 04/19/21 0600    Specimen: Blood Updated: 04/19/21 0756     Magnesium 2.0 mg/dL     POC Glucose Once [[id number]]  (Normal) Collected: 04/19/21 0725    Specimen: Blood Updated: 04/19/21 0726     Glucose 109 mg/dL     POC Glucose Once [[id number]]  (Abnormal) Collected: 04/18/21 2009    Specimen: Blood Updated: 04/18/21 2010     Glucose 172 mg/dL     POC Glucose Once [[id number]]  (Abnormal) Collected: 04/18/21 1754    Specimen: Blood Updated: 04/18/21 1756     Glucose 160 mg/dL     Potassium [[id number]]  (Normal) Collected: 04/18/21 1551    Specimen: Blood Updated: 04/18/21 1647     Potassium 4.7 mmol/L     POC Glucose Once [[id number]]  (Abnormal) Collected: 04/18/21 1118    Specimen: Blood Updated: 04/18/21 1120     Glucose 199 mg/dL     CBC & Differential [[id number]]  (Abnormal) Collected: 04/18/21 0626    Specimen: Blood Updated: 04/18/21 0752    Narrative:      The following orders were created for panel order CBC & Differential.  Procedure                               Abnormality         Status                      ---------                               -----------         ------                     Scan Slide[[id number]]                                       Final result               CBC Auto Differential[[id number]]        Abnormal            Final result                 Please view results for these tests on the individual orders.    Scan Slide [[id number]] Collected: 04/18/21 0626    Specimen: Blood Updated: 04/18/21 0752     Hypochromia Slight/1+     WBC Morphology Normal     Platelet Morphology Normal    CBC Auto Differential [[id number]]  (Abnormal) Collected: 04/18/21 0626    Specimen: Blood Updated: 04/18/21 0752     WBC 1.77 10*3/mm3      RBC 2.07 10*6/mm3      Hemoglobin 6.1 g/dL      Hematocrit 19.3 %      MCV 93.2 fL      MCH 29.5 pg      MCHC 31.6 g/dL      RDW 17.2 %      RDW-SD 57.7 fl      MPV 9.9 fL      Platelets 113 10*3/mm3      Neutrophil % 59.9 %      Lymphocyte % 29.9 %      Monocyte % 9.6 %      Eosinophil % 0.0 %      Basophil % 0.0 %      Immature Grans % 0.6 %      Neutrophils, Absolute 1.06 10*3/mm3      Lymphocytes, Absolute 0.53 10*3/mm3      Monocytes, Absolute 0.17 10*3/mm3      Eosinophils, Absolute 0.00 10*3/mm3      Basophils, Absolute 0.00 10*3/mm3      Immature Grans, Absolute 0.01 10*3/mm3      nRBC 0.0 /100 WBC     Narrative:      Appended report. These results have been appended to a previously verified report.    POC Glucose Once [[id number]]  (Normal) Collected: 04/18/21 0736    Specimen: Blood Updated: 04/18/21 0737     Glucose 92 mg/dL     Ferritin [[id number]]  (Abnormal) Collected: 04/18/21 0626    Specimen: Blood Updated: 04/18/21 0729     Ferritin 499.80 ng/mL     Narrative:      Results may be falsely decreased if patient taking Biotin.      Comprehensive Metabolic Panel [[id number]]  (Abnormal) Collected: 04/18/21 0626    Specimen: Blood Updated: 04/18/21 0727     Glucose 77 mg/dL      BUN 37 mg/dL      Creatinine 0.65 mg/dL      Sodium 140 mmol/L      Potassium 3.4  mmol/L      Chloride 104 mmol/L      CO2 27.0 mmol/L      Calcium 8.8 mg/dL      Total Protein 5.6 g/dL      Albumin 3.20 g/dL      ALT (SGPT) 12 U/L      AST (SGOT) 17 U/L      Alkaline Phosphatase 72 U/L      Total Bilirubin 0.3 mg/dL      eGFR Non African Amer 90 mL/min/1.73      Globulin 2.4 gm/dL      A/G Ratio 1.3 g/dL      BUN/Creatinine Ratio 56.9     Anion Gap 9.0 mmol/L     Narrative:      GFR Normal >60  Chronic Kidney Disease <60  Kidney Failure <15      Iron Profile [[id number]]  (Abnormal) Collected: 04/18/21 0626    Specimen: Blood Updated: 04/18/21 0727     Iron 94 mcg/dL      Iron Saturation 44 %      Transferrin 143 mg/dL      TIBC 213 mcg/dL     Phosphorus [[id number]]  (Normal) Collected: 04/18/21 0626    Specimen: Blood Updated: 04/18/21 0727     Phosphorus 3.0 mg/dL     Magnesium [[id number]]  (Abnormal) Collected: 04/18/21 0626    Specimen: Blood Updated: 04/18/21 0727     Magnesium 1.5 mg/dL     aPTT [[id number]]  (Normal) Collected: 04/18/21 0626    Specimen: Blood Updated: 04/18/21 0710     PTT 31.1 seconds     Narrative:      PTT = The equivalent PTT values for the therapeutic range of heparin levels at 0.3 to 0.5 U/ml are 55 to 70 seconds.    Protime-INR [[id number]]  (Normal) Collected: 04/18/21 0626    Specimen: Blood Updated: 04/18/21 0710     Protime 12.7 Seconds      INR 0.98    COVID PRE-OP / PRE-PROCEDURE SCREENING ORDER (NO ISOLATION) - Swab, Nasopharynx [[id number]]  (Normal) Collected: 04/17/21 2229    Specimen: Swab from Nasopharynx Updated: 04/17/21 2325    Narrative:      The following orders were created for panel order COVID PRE-OP / PRE-PROCEDURE SCREENING ORDER (NO ISOLATION) - Swab, Nasopharynx.  Procedure                               Abnormality         Status                     ---------                               -----------         ------                     COVID-19,CEPHEID,ROHIT IN-...[[id number]]  Normal              Final result                 Please view results  for these tests on the individual orders.    COVID-19,CEPHEID,ROHIT IN-HOUSE(OR EMERGENT/ADD-ON),NP SWAB IN TRANSPORT MEDIA 3-4 HR TAT - Swab, Nasopharynx [[id number]]  (Normal) Collected: 04/17/21 2229    Specimen: Swab from Nasopharynx Updated: 04/17/21 2325     COVID19 Not Detected    Narrative:      Fact sheet for providers: https://www.fda.gov/media/396910/download     Fact sheet for patients: https://www.fda.gov/media/056387/download    POC Glucose Once [[id number]]  (Normal) Collected: 04/17/21 2216    Specimen: Blood Updated: 04/17/21 2218     Glucose 117 mg/dL     [place] Draw [[id number]] Collected: 04/17/21 1805    Specimen: Blood Updated: 04/17/21 2215    Narrative:      The following orders were created for panel order [place] Draw.  Procedure                               Abnormality         Status                     ---------                               -----------         ------                     Light Blue Top[[id number]]                                   Final result               Green Top (Gel)[[id number]]                                  Final result               Lavender Top[[id number]]                                     Final result               Gold Top - SST[[id number]]                                   Final result               Thapa Top[[id number]]                                         Final result                 Please view results for these tests on the individual orders.    [place] Top [[id number]] Collected: 04/17/21 1805    Specimen: Blood Updated: 04/17/21 2215     Extra Tube Hold for add-ons.     Comment: Auto resulted.       Troponin [[id number]]  (Normal) Collected: 04/17/21 1957    Specimen: Blood Updated: 04/17/21 2029     Troponin T <0.010 ng/mL     Narrative:      Troponin T Reference Range:  <= 0.03 ng/mL-   Negative for AMI  >0.03 ng/mL-     Abnormal for myocardial necrosis.  Clinicians would have to utilize clinical acumen, EKG, Troponin and serial changes to determine if it is  an Acute Myocardial Infarction or myocardial injury due to an underlying chronic condition.       Results may be falsely decreased if patient taking Biotin.      BNP [[id number]]  (Normal) Collected: 04/17/21 1805    Specimen: Blood Updated: 04/17/21 1929     proBNP 609.3 pg/mL     Narrative:      Among patients with dyspnea, NT-proBNP is highly sensitive for the detection of acute congestive heart failure. In addition NT-proBNP of <300 pg/ml effectively rules out acute congestive heart failure with 99% negative predictive value.    Results may be falsely decreased if patient taking Biotin.      Troponin [[id number]]  (Normal) Collected: 04/17/21 1805    Specimen: Blood Updated: 04/17/21 1929     Troponin T <0.010 ng/mL     Narrative:      Troponin T Reference Range:  <= 0.03 ng/mL-   Negative for AMI  >0.03 ng/mL-     Abnormal for myocardial necrosis.  Clinicians would have to utilize clinical acumen, EKG, Troponin and serial changes to determine if it is an Acute Myocardial Infarction or myocardial injury due to an underlying chronic condition.       Results may be falsely decreased if patient taking Biotin.      Comprehensive Metabolic Panel [[id number]]  (Abnormal) Collected: 04/17/21 1805    Specimen: Blood Updated: 04/17/21 1920     Glucose 133 mg/dL      BUN 42 mg/dL      Creatinine 0.76 mg/dL      Sodium 137 mmol/L      Potassium 4.2 mmol/L      Comment: Slight hemolysis detected by analyzer. Results may be affected.        Chloride 100 mmol/L      CO2 26.0 mmol/L      Calcium 9.5 mg/dL      Total Protein 6.6 g/dL      Albumin 3.80 g/dL      ALT (SGPT) 11 U/L      AST (SGOT) 20 U/L      Alkaline Phosphatase 90 U/L      Total Bilirubin 0.3 mg/dL      eGFR Non African Amer 75 mL/min/1.73      Globulin 2.8 gm/dL      A/G Ratio 1.4 g/dL      BUN/Creatinine Ratio 55.3     Anion Gap 11.0 mmol/L     Narrative:      GFR Normal >60  Chronic Kidney Disease <60  Kidney Failure <15      Lipase [[id number]]  (Normal)  Collected: 04/17/21 1805    Specimen: Blood Updated: 04/17/21 1915     Lipase 30 U/L     Lavender Top [[id number]] Collected: 04/17/21 1805    Specimen: Blood Updated: 04/17/21 1915     Extra Tube hold for add-on     Comment: Auto resulted       Light Blue Top [[id number]] Collected: 04/17/21 1805    Specimen: Blood Updated: 04/17/21 1915     Extra Tube hold for add-on     Comment: Auto resulted       Green Top (Gel) [[id number]] Collected: 04/17/21 1805    Specimen: Blood Updated: 04/17/21 1915     Extra Tube Hold for add-ons.     Comment: Auto resulted.       Gold Top - SST [[id number]] Collected: 04/17/21 1805    Specimen: Blood Updated: 04/17/21 1915     Extra Tube Hold for add-ons.     Comment: Auto resulted.       CBC & Differential [[id number]]  (Abnormal) Collected: 04/17/21 1805    Specimen: Blood Updated: 04/17/21 1843    Narrative:      The following orders were created for panel order CBC & Differential.  Procedure                               Abnormality         Status                     ---------                               -----------         ------                     Scan Slide[[id number]]                   Normal              Final result               CBC Auto Differential[[id number]]        Abnormal            Final result                 Please view results for these tests on the individual orders.    Scan Slide [[id number]]  (Normal) Collected: 04/17/21 1805    Specimen: Blood Updated: 04/17/21 1843     RBC Morphology Normal     WBC Morphology Normal     Platelet Morphology Normal    CBC Auto Differential [[id number]]  (Abnormal) Collected: 04/17/21 1805    Specimen: Blood Updated: 04/17/21 1843     WBC 3.81 10*3/mm3      RBC 2.95 10*6/mm3      Hemoglobin 8.7 g/dL      Hematocrit 26.8 %      MCV 90.8 fL      MCH 29.5 pg      MCHC 32.5 g/dL      RDW 17.5 %      RDW-SD 57.9 fl      Platelets 11 10*3/mm3      Neutrophil % 73.1 %      Lymphocyte % 19.2 %      Monocyte % 6.6 %      Eosinophil % 0.3 %       Basophil % 0.3 %      Immature Grans % 0.5 %      Neutrophils, Absolute 2.79 10*3/mm3      Lymphocytes, Absolute 0.73 10*3/mm3      Monocytes, Absolute 0.25 10*3/mm3      Eosinophils, Absolute 0.01 10*3/mm3      Basophils, Absolute 0.01 10*3/mm3      Immature Grans, Absolute 0.02 10*3/mm3      nRBC 0.0 /100 WBC     Narrative:      Appended report. These results have been appended to a previously verified report.          Imaging Results (Last 72 Hours)     Procedure Component Value Units Date/Time    XR Chest 1 View [[id number]] Collected: 04/17/21 1847     Updated: 04/18/21 1156    Narrative:         EXAMINATION: XR CHEST 1 VW - 04/17/2021     INDICATION: Chest Pain triage protocol      COMPARISON: 01/11/2021     FINDINGS: Portable chest reveals cardiac and mediastinal silhouettes  within normal limits. Lung fields reveal interval improvement seen in  the appearance of the masslike densities within the lung fields  bilaterally. The heart is upper limits of normal in size. There is no  pleural effusion or pneumothorax. Degenerative changes seen within the  spine. Portacatheter identified on the left with tip in the SVC.       Impression:      Interval decrease seen in size of the masslike densities  within the lung fields bilaterally, left greater than right.  Portacatheter identified on the left with tip in the SVC. No  superimposed infectious process present.     DICTATED:   04/17/2021  EDITED/ls :   04/18/2021     This report was finalized on 4/18/2021 11:53 AM by Dr. Kalani Laurent MD.       CT Angiogram Chest [[id number]] Collected: 04/17/21 2030     Updated: 04/17/21 2032    Narrative:      CTA Chest    INDICATION:   Metastatic disease in the lungs. Shortness of breath prior to arrival.    TECHNIQUE:   CT angiogram of the chest with 100 cc of Isovue-300 IV contrast. 3-D reconstructions were obtained and reviewed.   Radiation dose reduction techniques included automated exposure control or exposure  modulation based on body size. Count of known CT and  cardiac nuc med studies performed in previous 12 months: 3.     COMPARISON:   3-21    FINDINGS:   Chest images at mediastinal window show no pulmonary artery filling defects to suggest emboli. There is extensive mediastinal and hilar adenopathy especially the right hilum. The lymph nodes at the right hilum partially encase and narrow the segmental  pulmonary artery branch to the right middle lobe. No pleural or pericardial fluid is seen. There has been marked progression of metastatic disease in the chest since the previous examination. As a typical example, the diameter of the nodes at the right  hilum measures 3.5 cm compared with 2.8 cm on the previous exam. A node in the aortopulmonic window on the left measures 2.4 x 3 cm in transverse diameter compared with 0.8 x 2.8 cm on the previous exam.    Chest images at lung window show multiple lung masses, all of which are significantly larger since previous examination. As a typical example, there is a mass posteriorly in the left lower lobe measuring 2.3 x 2.7 cm. On the previous scan it measured 1.6  x 1.7 cm. Other mass lesions show similar progression.      Impression:      There is marked progression of metastatic disease in the chest since the previous examination. The segmental pulmonary artery to the right middle lobe is encased by tumor and narrowed as a result. This appears similar to the previous examination. No  evidence of pulmonary embolism.    Signer Name: Austin Sheets MD   Signed: 4/17/2021 8:30 PM   Workstation Name: [id number]    [facility]        Operative/Procedure Notes (last 72 hours) (Notes from 04/16/21 1244 through 04/19/21 1244)    No notes of this type exist for this encounter.            Physician Progress Notes (last 72 hours) (Notes from 04/16/21 1244 through 04/19/21 1244)      Zuly Evans MD at 04/19/21 1111          Ele Langley  Marco A  [id number]  1950      Reason for consultation: History of endometrial cancer, now with biopsy-proven lung metastasis, hospital admission for pancytopenia, weakness, dyspnea with chest pain, dehydration  Original referring provider:  Kaylen Taylor DO     History of present illness:  The patient is a 71 y.o. year old female who was admitted to the hospital 2021 after emergency room visit.  She had called me and was complaining of chest pain and shortness of breath.      Today, patient appears to be doing well.  Is sitting up on arrival, on room air.  States that her pain is well controlled with oral pain medication.  Insistent that she must go home today.  Says she is tolerating normal diet having minimal acid reflux.  States she overall feels well, and that pain is improved since arrival.    On presentation, she is noted to have significant thrombocytopenia with platelet counts of 11.  She was admitted and underwent transfusion.  She has had a good initial response and her platelets although worsening anemia related to chemotherapy and correction of dehydration.Patient notes she hadn't felt well for a couple of weeks.  Denied any melena or bright red blood per rectum.      OBGYN History:  She is a .  She does not currently use HRT, but was briefly on progesterone around the time that she went through menopause. She does not have a history of abnormal pap smears.    Oncologic History:  Oncology/Hematology History   Endometrial cancer (CMS/HCC)   10/23/2019 Initial Diagnosis    Patient referred to Gyn Oncology due to postmenopausal bleeding x 10 months and pap smear showing atypical glandular cells with negative HPV.   Endometrial biopsy obtained. Pathology revealed grade 2 endometrioid adenocarcinoma.      2019 Imaging    Pre-op CT abdomen pelvis showed lobular hypoenhancing mass in the uterus and the cervix, presumably known neoplasm. Incidental right lobe liver hemangioma noted, unchanged  from previous imaging. No evidence of metastatic disease.      11/15/2019 Surgery    Type 1 radical RTLH/BSO, lysis of adhesions, and bilateral pelvic lymph node dissection.     Final pathology showed 9.5 x 6.0 x 2.5 cm grade 2 EAC with focal mucinous and secretory features, invasive into >90% of the myometrium. Tumor invades cervical stroma. No lymphvascular invasion and nodes negative.  MSI by IHC showed absent MLH1 and PMS2, reflex hypermethylation positive = sporadic tumor.  Stage II grade 2     1/24/2020 - 3/11/2020 Radiation    Adjuvant external beam radiation plus vaginal brachytherapy.  The pelvis received a dose of 45 Gy in 25 fractions, followed by a boost to the apex of the vagina with 12 Gy in 2 fractions.  Treatments tolerated well.      5/7/2020 Survivorship    Survivorship Care Plan completed and discussed with patient.  Copy of Survivorship Care Plan provided to patient and primary care provider.     12/14/2020 Molecular Testing    CARIS results:  MMR deficient/MSI high--level 1 for pembrolizumab  ER positive 2+, 85%; AR positive 2+, 65%  ARID1A pathogenic variant, PTEN pathogenic variant  BRCA 1/2 negative, PD-L1 negative     12/22/2020 -  Chemotherapy    Dose modification of both carboplatin and paclitaxel following cycle #2 related to weight loss, neuropathy  CT after cycle #4 showed partial response in chest  OP ENDOMETRIAL PACLitaxel / CARBOplatin (Q21D)           Past Medical History:   Diagnosis Date   • Achalasia, esophageal    • Anxiety and depression    • Arthritis    • Chronic back pain    • Chronic bronchitis (CMS/HCC)    • COPD (chronic obstructive pulmonary disease) (CMS/HCC)     no home O2   • Diabetes (CMS/HCC)    • Endometrial cancer (CMS/HCC) 11/2019    Stage II   • Essential hypertension 7/5/2019   • Frequent urination    • Gall stones    • GERD (gastroesophageal reflux disease)    • Heart murmur    • High blood pressure    • History of brachytherapy 03/11/2020    vagina   •  History of prolapse of bladder    • History of radiation therapy 03/03/2020    Pelvis   • Pinched nerve     back   • Spinal stenosis    • Wears dentures    • Wears glasses        Past Surgical History:   Procedure Laterality Date   • COLONOSCOPY  01/2020   • ENDOSCOPY     • EPIDURAL  02/2020   • HELLER MYOTOMY LAPAROSCOPIC WITH ENDOSCOPY  2012   • TOTAL LAPAROSCOPIC HYSTERECTOMY SALPINGO OOPHORECTOMY Bilateral 11/15/2019    Procedure: TYPE 1 RADICAL TOTAL LAPAROSCOPIC HYSTERECTOMY BILATERAL SALPINGOOPHORECTOMY, LYSIS OF ADHESTIONS, BILATERAL PELVIC LYMPH NODE DISSECTION WITH DAVINCI ROBOT;  Surgeon: Zuly Evans MD;  Location: [facility];  Service: DaVinci   • UPPER GASTROINTESTINAL ENDOSCOPY  01/2020       MEDICATIONS: Hospital medication list reviewed    Allergies:  is allergic to augmentin [amoxicillin-pot clavulanate] and biaxin [clarithromycin].    Social History:   Social History     Socioeconomic History   • Marital status:      Spouse name: Not on file   • Number of children: 2   • Years of education: Not on file   • Highest education level: Not on file   Tobacco Use   • Smoking status: Current Every Day Smoker     Packs/day: 1.00     Years: 30.00     Pack years: 30.00     Types: Cigarettes   • Smokeless tobacco: Never Used   Vaping Use   • Vaping Use: Former   • Substances: Nicotine, Flavoring   • Devices: Pre-filled or refillable cartridge, Pre-filled pod   Substance and Sexual Activity   • Alcohol use: No   • Drug use: No   • Sexual activity: Defer       Family History:    Family History   Problem Relation Age of Onset   • Heart attack Mother    • Uterine cancer Mother 84   • Lung cancer Brother    • Colon cancer Half-Sister 50   • Colon cancer Paternal Aunt    • Colon cancer Cousin    • Heart attack Brother    • Hypertension Daughter    • Hypertension Son        Health Maintenance:    Health Maintenance   Topic Date Due   • MAMMOGRAM  Never done   • URINE MICROALBUMIN  Never done   • DXA  SCAN  Never done   • ANNUAL PHYSICAL  Never done   • COVID-19 Vaccine (1) Never done   • TDAP/TD VACCINES (1 - Tdap) Never done   • Pneumococcal Vaccine 65+ (1 of 1 - PPSV23) Never done   • ZOSTER VACCINE (2 of 3) 07/25/2016   • HEPATITIS C SCREENING  Never done   • DIABETIC FOOT EXAM  Never done   • DIABETIC EYE EXAM  Never done   • HEMOGLOBIN A1C  05/14/2020   • INFLUENZA VACCINE  08/01/2021   • LUNG CANCER SCREENING  04/17/2022   • COLONOSCOPY  01/21/2030       Review of Systems   Constitutional: Positive for appetite change (poor appetite), fatigue. Negative for chills and fever.          HENT: Positive for congestion, postnasal drip. Negative for ear discharge, ear pain, hearing loss, mouth sores, nosebleeds, rhinorrhea, sinus pain, sore throat, tinnitus and trouble swallowing.    Eyes: Positive for itching and visual disturbance (blurred vision). Negative for pain.   Respiratory: Positive for cough and shortness of breath. Negative for wheezing.    Cardiovascular: Positive for chest pain on admission, resolved now  Gastrointestinal: Negative for abdominal pain, blood in stool, constipation, diarrhea, nausea and vomiting.        Heartburn   Endocrine: Negative for heat intolerance   Genitourinary: Negative for difficulty urinating, flank pain, frequency, hematuria and urgency.        History of urinary and fecal incontinence   Musculoskeletal: Positive for arthralgias (bilateral shoulders), back pain (pinched nerve), gait problem (limping), joint swelling and myalgias.   Skin: Negative for color change, rash and wound.   Allergic/Immunologic: Negative for immunocompromised state.   Neurological: Positive for numbness. Negative for dizziness, seizures, syncope, weakness and headaches.   Hematological: Negative for adenopathy. Bruises/bleeds easily.   Psychiatric/Behavioral: Positive for dysphoric mood. Negative for agitation, confusion and sleep disturbance. The patient is not nervous/anxious.    All other  systems reviewed and are negative.    Physical Exam    Vitals:    04/19/21 0300 04/19/21 0401 04/19/21 0500 04/19/21 0543   BP:  167/73     BP Location:  Left arm     Patient Position:  Lying     Pulse: 76 75 80 77   Resp:  16     Temp:  97.5 °F (36.4 °C)     TempSrc:  Oral     SpO2:       Weight:    55.1 kg (121 lb 8 oz)   Height:         Body mass index is 20.86 kg/m².    Wt Readings from Last 3 Encounters:   04/19/21 55.1 kg (121 lb 8 oz)   04/14/21 54.4 kg (120 lb)   04/13/21 53.5 kg (118 lb)     GENERAL: Alert, thin female appearing her stated age who is in no apparent distress.   HEENT: Sclera anicteric. Head normocephalic, atraumatic. Mucus membranes moist.   NECK: Trachea midline, supple, without masses.   CARDIOVASCULAR: Normal rate, regular rhythm,+ systolic ejection murmur, no rubs, or gallops.  No peripheral edema.  RESPIRATORY: Clear to auscultation bilaterally, normal respiratory effort with occasional cough  BACK:  No CVA tenderness, no vertebral tenderness on palpation  GASTROINTESTINAL:  Abdomen is soft, non-tender, non-distended, no rebound or guarding, no masses, or hernias. No HSM.  SKIN:  Warm, dry, well-perfused.  All visible areas intact.  Ecchymosis left scalp.  Left sided port.  PSYCHIATRIC: AO x3, with appropriate affect, normal thought processes.  NEUROLOGIC: No focal deficits.   MUSCULOSKELETAL: Examined while in bed.  EXTREMITIES:   No cyanosis, clubbing, symmetric.  LYMPHATICS:  No cervical or inguinal adenopathy noted.  Mild supraclavicular fullness on the left when compared to right.     PELVIC exam: Deferred    ECOG PS 2    PROCEDURES: None      Diagnostic Data:   XR Chest 1 View    Result Date: 4/18/2021  Interval decrease seen in size of the masslike densities within the lung fields bilaterally, left greater than right. Portacatheter identified on the left with tip in the SVC. No superimposed infectious process present.  DICTATED:   04/17/2021 EDITED/ls :   04/18/2021  This report  was finalized on 4/18/2021 11:53 AM by Dr. Kalani Laurent MD.      CT Angiogram Chest    Result Date: 4/17/2021  There is marked progression of metastatic disease in the chest since the previous examination. The segmental pulmonary artery to the right middle lobe is encased by tumor and narrowed as a result. This appears similar to the previous examination. No evidence of pulmonary embolism. Signer Name: Austin Sheets MD  Signed: 4/17/2021 8:30 PM  Workstation Name: [id number]  [facility]        Lab Results   Component Value Date    WBC 1.77 (C) 04/18/2021    HGB 6.1 (C) 04/18/2021    HCT 19.3 (C) 04/18/2021    MCV 93.2 04/18/2021     (L) 04/18/2021    NEUTROABS 1.06 (L) 04/18/2021    GLUCOSE 77 04/18/2021    BUN 37 (H) 04/18/2021    CREATININE 0.65 04/18/2021    EGFRIFNONA 90 04/18/2021     04/18/2021    K 4.7 04/18/2021     04/18/2021    CO2 27.0 04/18/2021    MG 1.5 (L) 04/18/2021    PHOS 3.0 04/18/2021    CALCIUM 8.8 04/18/2021    ALBUMIN 3.20 (L) 04/18/2021    AST 17 04/18/2021    ALT 12 04/18/2021    BILITOT 0.3 04/18/2021     No results found for:         Assessment/Plan   This is a 71 y.o. woman with biopsy-proven recurrent endometrial cancer with lung metastasis and mediastinal adenopathy now admitted for pancytopenia, dyspnea with chest pain    History of endometrial cancer as detailed above, now hospitalized with pancytopenia, significant progression on chest despite initial response to chemotherapy  -PLT 79 this morning, stable  - Fall precautions on discharge with significant thrombocytopenia.  -We will plan on Keytruda versus Keytruda plus lenvatinib after improvement from this acute illness.  -Status post platelet and blood transfusion.  No G-CSF indicated at this point    Neutropenia  - WBC 1.53, ANC 1.02  - febrile precautions on discharge    Dyspnea with chest pain  Covid negative  Symptoms improving with supportive management, blood  transfusion, reflux treatment    Nausea, dehydration  Resolved    Dayanara Hoffman MD  21  15:34 EDT    I saw and evaluated the patient. I agree with the findings and the plan of care as documented in the note.  Patient to follow-up with me 2021 for lab check and symptom review.  I discussed in detail the importance of having a support system with a diagnosis of a terminal cancer.  Her daughter lives in [place] and she does not like to bother her daughter because she works long hours and has a busy work schedule.  I strongly advised the patient that she needs to broaden her support system and have someone available who has a key to her residence.  Patient is adamant she goes home since her dog has not been let outside for 2 days.  Febrile precautions and fall precautions were discussed.  The patient is okay for discharge otherwise from my standpoint.  Will likely need some more aggressive management of her reflux as an outpatient.    Zuly Evans MD  21  11:17 EDT                Electronically signed by Zuly Evans MD at 21 1120     Rupa Fernandez DO at 21 0748              [facility] Medicine Services  PROGRESS NOTE    Patient Name: Ele Strickland  : 1950  MRN: [id number]    Date of Admission: 2021  Primary Care Physician: Sharon Acosta DO    Subjective   Subjective     CC:  SOA, CP     HPI:  States she has been having left sided rib/shoulder pain that is worse with movement x 2 weeks. Associated SOA. States turning over on right side would help alleviate pain. Reviewed transfusions.     ROS:  Gen- No fevers, chills  CV- + chest pain, palpitations  Resp- No cough, + dyspnea  GI- No N/V/D, abd pain     Objective   Objective     Vital Signs:   Temp:  [97.5 °F (36.4 °C)-98.7 °F (37.1 °C)] 97.9 °F (36.6 °C)  Heart Rate:  [] 72  Resp:  [16-18] 18  BP: (109-146)/(58-91) 125/61        Physical Exam:  Constitutional: No acute  distress, awake, alert; frail  HENT: NCAT, mucous membranes moist  Respiratory: Clear to auscultation bilaterally, respiratory effort normal   Cardiovascular: RRR, IV/VI murmur   Gastrointestinal: Positive bowel sounds, soft, nontender, nondistended  Musculoskeletal: No bilateral ankle edema  Psychiatric: Appropriate affect, cooperative  Neurologic: Oriented x 3, strength symmetric in all extremities, Cranial Nerves grossly intact to confrontation, speech clear  Skin: No rashes    Results Reviewed:  Results from last 7 days   Lab Units 04/18/21 0626 04/17/21 1805 04/13/21  1517   WBC 10*3/mm3 1.77* 3.81 4.00   HEMOGLOBIN g/dL 6.1* 8.7* 9.5*   HEMATOCRIT % 19.3* 26.8* 29.1*   PLATELETS 10*3/mm3 113* 11* 44*   INR  0.98  --   --      Results from last 7 days   Lab Units 04/18/21 0626 04/17/21 1957 04/17/21 1805 04/13/21  1517   SODIUM mmol/L 140  --  137 136   POTASSIUM mmol/L 3.4*  --  4.2 4.1   CHLORIDE mmol/L 104  --  100 98   CO2 mmol/L 27.0  --  26.0 22.0   BUN mg/dL 37*  --  42* 35*   CREATININE mg/dL 0.65  --  0.76 0.89   GLUCOSE mg/dL 77  --  133* 112*   CALCIUM mg/dL 8.8  --  9.5 9.3   ALT (SGPT) U/L 12  --  11 14   AST (SGOT) U/L 17  --  20 17   TROPONIN T ng/mL  --  <0.010 <0.010  --    PROBNP pg/mL  --   --  609.3  --      Estimated Creatinine Clearance: 55 mL/min (by C-G formula based on SCr of 0.65 mg/dL).    Microbiology Results Abnormal     Procedure Component Value - Date/Time    COVID PRE-OP / PRE-PROCEDURE SCREENING ORDER (NO ISOLATION) - Swab, Nasopharynx [[id number]]  (Normal) Collected: 04/17/21 2229    Lab Status: Final result Specimen: Swab from Nasopharynx Updated: 04/17/21 2325    Narrative:      The following orders were created for panel order COVID PRE-OP / PRE-PROCEDURE SCREENING ORDER (NO ISOLATION) - Swab, Nasopharynx.  Procedure                               Abnormality         Status                     ---------                               -----------         ------                      COVID-19,CEPHEID,ROHIT IN-...[[id number]]  Normal              Final result                 Please view results for these tests on the individual orders.    COVID-19,CEPHEID,ROHIT IN-HOUSE(OR EMERGENT/ADD-ON),NP SWAB IN TRANSPORT MEDIA 3-4 HR TAT - Swab, Nasopharynx [[id number]]  (Normal) Collected: 04/17/21 2229    Lab Status: Final result Specimen: Swab from Nasopharynx Updated: 04/17/21 2325     COVID19 Not Detected    Narrative:      Fact sheet for providers: https://www.fda.gov/media/039452/download     Fact sheet for patients: https://www.fda.gov/media/547145/download          Imaging Results (Last 24 Hours)     Procedure Component Value Units Date/Time    CT Angiogram Chest [[id number]] Collected: 04/17/21 2030     Updated: 04/17/21 2032    Narrative:      CTA Chest    INDICATION:   Metastatic disease in the lungs. Shortness of breath prior to arrival.    TECHNIQUE:   CT angiogram of the chest with 100 cc of Isovue-300 IV contrast. 3-D reconstructions were obtained and reviewed.   Radiation dose reduction techniques included automated exposure control or exposure modulation based on body size. Count of known CT and  cardiac nuc med studies performed in previous 12 months: 3.     COMPARISON:   3-21    FINDINGS:   Chest images at mediastinal window show no pulmonary artery filling defects to suggest emboli. There is extensive mediastinal and hilar adenopathy especially the right hilum. The lymph nodes at the right hilum partially encase and narrow the segmental  pulmonary artery branch to the right middle lobe. No pleural or pericardial fluid is seen. There has been marked progression of metastatic disease in the chest since the previous examination. As a typical example, the diameter of the nodes at the right  hilum measures 3.5 cm compared with 2.8 cm on the previous exam. A node in the aortopulmonic window on the left measures 2.4 x 3 cm in transverse diameter compared with 0.8 x 2.8 cm on the previous  exam.    Chest images at lung window show multiple lung masses, all of which are significantly larger since previous examination. As a typical example, there is a mass posteriorly in the left lower lobe measuring 2.3 x 2.7 cm. On the previous scan it measured 1.6  x 1.7 cm. Other mass lesions show similar progression.      Impression:      There is marked progression of metastatic disease in the chest since the previous examination. The segmental pulmonary artery to the right middle lobe is encased by tumor and narrowed as a result. This appears similar to the previous examination. No  evidence of pulmonary embolism.    Signer Name: Austin Sheets MD   Signed: 4/17/2021 8:30 PM   Workstation Name: [id number]    [facility]    XR Chest 1 View [[id number]] Collected: 04/17/21 1847     Updated: 04/17/21 1849    Narrative:         EXAMINATION: XR CHEST 1 VW-      INDICATION: Chest Pain triage protocol      COMPARISON: 01/11/2021     FINDINGS: Portable chest reveals cardiac and mediastinal silhouettes  within normal limits. Lung fields reveal interval improvement seen in  the appearance of the masslike densities within the lung fields  bilaterally. The heart is upper limits of normal in size. There is no  pleural effusion or pneumothorax. Degenerative changes seen within the  spine. There are identified within the left tip in the SVC.       Impression:      Interval decrease seen in size of the masslike densities  within the lung fields bilaterally left greater than right.  Portacatheter identified on the left tip in the SVC. No superimposed  infectious process present.                Results for orders placed during the hospital encounter of 10/30/19    Adult Transthoracic Echo Complete W/ Cont if Necessary Per Protocol    Interpretation Summary  · Left ventricular systolic function is normal. Calculated EF = 58.0%. Estimated EF appears to be in the range of 56 - 60%.  · There is moderate aortic  valve calcification. There is moderate aortic stenosis. Peak gradient 59 mmHg, mean gradient 32 mmHg. Dimensionless index 0.4. Calculated aortic valve area 1.13 cm²  · Mild to moderate aortic regurgitation with a pressure half-time of 577 ms. Vena contract of 0.4 cm  · Sigmoid-shaped ventricular septum is present  · Left ventricular diastolic (grade I) consistent with impaired relaxation.  · Mild tricuspid valve regurgitation is present. Estimated right ventricular systolic pressure from tricuspid regurgitation is mildly elevated (35-45 mmHg).      I have reviewed the medications:  Scheduled Meds:atorvastatin, 20 mg, Oral, Nightly  DULoxetine, 20 mg, Oral, Daily  ferrous sulfate, 325 mg, Oral, Daily With Breakfast  gabapentin, 300 mg, Oral, TID  insulin lispro, 0-7 Units, Subcutaneous, TID AC  oxybutynin XL, 5 mg, Oral, Daily  pantoprazole, 40 mg, Intravenous, Q12H  sodium chloride, 10 mL, Intravenous, Q12H      Continuous Infusions:sodium chloride, 75 mL/hr, Last Rate: 75 mL/hr (04/17/21 5180)      PRN Meds:.•  acetaminophen **OR** acetaminophen **OR** acetaminophen  •  albuterol  •  dextrose  •  dextrose  •  glucagon (human recombinant)  •  HYDROcodone-acetaminophen  •  ondansetron **OR** ondansetron  •  sodium chloride  •  sodium chloride  •  sodium chloride    Assessment/Plan   Assessment & Plan     Active Hospital Problems    Diagnosis  POA   • Shortness of breath [R06.02]  Yes   • Thrombocytopenia (CMS/HCC) [D69.6]  Unknown      Resolved Hospital Problems   No resolved problems to display.        Brief Hospital Course to date:  Ele Strickland is a 71 y.o. female with known endometrial cancer currently being treated with chemo last being 4/7, presents with SOA on exertion and CP.     Gen weakness  Pancytopenia   - likely 2nd to recent chemo  - s/p 2 units of platelets and 2 units of PRBC   - Continue IVF   - Continue ferrous sulfate   - Holding Dyazide   - Holding ASA   - Repeat labs     Dyspnea   CP   - COVID  neg; CTA no PE, progression of metastatic diease, segmental pulmonary artery to RML encased with tumor and narrowed - similar to previous   - Trop neg x 2; EKG reviewed   - Anemia also contributing   - Transfuse; duo-neb; monitor   - Continue PPI; tums ordered     Endometrial carcinoma, diagnosed in 2019, with mets to chest, hilar adenopathy   -follows up with Dr. Crowell   -s/p surgery, brachytherapy, s/p chemo-carbo/Taxol-last dose on 4/7/2021., Arimidex 1 mg daily, Decadron  -worsening metastatic dis in Lungs- Consult Dr. Crowell      GERD/achlasia-Nexium 40 mg at home  -Protonix bid, since pt is co of worsening reflux sx.  - Likely contributing to her CP      Neuropathy-Neurontin 300 mg every 8-ct.     DM 2-metformin 1000 mg every 12 hrs at home  - SSI - monitor      Bladder dysfunction-Ditropan 5 mg p.o. daily     Hyperlipidemia-Zocor 40 mg p.o. daily     Hypertension - holding dyazide      Smoker-nicotine, smoking cessation advice given     Aortic Stenosis- may need to repeat echo, if pt still fills poorly after hydration -- last ECHO 2019 with moderate stenosis        DVT prophylaxis:    -TEDs/SCDs    CODE STATUS:   Code Status and Medical Interventions:   Ordered at: 04/17/21 6327     Level Of Support Discussed With:    Patient     Code Status:    CPR     Medical Interventions (Level of Support Prior to Arrest):    Full       Rupa Fernandez DO  04/18/21                Electronically signed by Rupa Fernandez DO at 04/18/21 1302          Consult Notes (last 72 hours) (Notes from 04/16/21 1244 through 04/19/21 1244)      Zuly Evans MD at 04/18/21 1211      Consult Orders    1. Inpatient Gynecologic Oncology Consult [[id number]] ordered by Pia William MD at 04/17/21 2241               Ele Strickland  [id number]  1950      Reason for consultation: History of endometrial cancer, now with biopsy-proven lung metastasis, hospital admission for pancytopenia, weakness, dyspnea with chest pain,  dehydration  Original referring provider:  Kaylen Taylor DO     History of present illness:  The patient is a 71 y.o. year old female who was admitted to the hospital 2021 after emergency room visit.  She had called me and was complaining of chest pain and shortness of breath.  On presentation, she is noted to have significant thrombocytopenia with platelet counts of 11.  She was admitted and underwent transfusion.  She has had a good initial response and her platelets although worsening anemia related to chemotherapy and correction of dehydration.  Today, patient notes she hadn't felt well for a couple of weeks. Notes bump on head last week.  Denies fall.  Thinks it happened in her sleep.  Nausea better with antiemetics.  Last BM 2-3 days ago.  Normal for her.  No melena/BRBPR.  Was SOB and having right sided CP - better now.  Some reflux.      OBGYN History:  She is a .  She does not currently use HRT, but was briefly on progesterone around the time that she went through menopause. She does not have a history of abnormal pap smears.    Oncologic History:  Oncology/Hematology History   Endometrial cancer (CMS/HCC)   10/23/2019 Initial Diagnosis    Patient referred to Gyn Oncology due to postmenopausal bleeding x 10 months and pap smear showing atypical glandular cells with negative HPV.   Endometrial biopsy obtained. Pathology revealed grade 2 endometrioid adenocarcinoma.      2019 Imaging    Pre-op CT abdomen pelvis showed lobular hypoenhancing mass in the uterus and the cervix, presumably known neoplasm. Incidental right lobe liver hemangioma noted, unchanged from previous imaging. No evidence of metastatic disease.      11/15/2019 Surgery    Type 1 radical RTLH/BSO, lysis of adhesions, and bilateral pelvic lymph node dissection.     Final pathology showed 9.5 x 6.0 x 2.5 cm grade 2 EAC with focal mucinous and secretory features, invasive into >90% of the myometrium. Tumor invades cervical stroma.  No lymphvascular invasion and nodes negative.  MSI by IHC showed absent MLH1 and PMS2, reflex hypermethylation positive = sporadic tumor.  Stage II grade 2     1/24/2020 - 3/11/2020 Radiation    Adjuvant external beam radiation plus vaginal brachytherapy.  The pelvis received a dose of 45 Gy in 25 fractions, followed by a boost to the apex of the vagina with 12 Gy in 2 fractions.  Treatments tolerated well.      5/7/2020 Survivorship    Survivorship Care Plan completed and discussed with patient.  Copy of Survivorship Care Plan provided to patient and primary care provider.     12/14/2020 Molecular Testing    CARIS results:  MMR deficient/MSI high--level 1 for pembrolizumab  ER positive 2+, 85%; DE positive 2+, 65%  ARID1A pathogenic variant, PTEN pathogenic variant  BRCA 1/2 negative, PD-L1 negative     12/22/2020 -  Chemotherapy    Dose modification of both carboplatin and paclitaxel following cycle #2 related to weight loss, neuropathy  CT after cycle #4 showed partial response in chest  OP ENDOMETRIAL PACLitaxel / CARBOplatin (Q21D)           Past Medical History:   Diagnosis Date   • Achalasia, esophageal    • Anxiety and depression    • Arthritis    • Chronic back pain    • Chronic bronchitis (CMS/HCC)    • COPD (chronic obstructive pulmonary disease) (CMS/HCC)     no home O2   • Diabetes (CMS/HCC)    • Endometrial cancer (CMS/HCC) 11/2019    Stage II   • Essential hypertension 7/5/2019   • Frequent urination    • Gall stones    • GERD (gastroesophageal reflux disease)    • Heart murmur    • High blood pressure    • History of brachytherapy 03/11/2020    vagina   • History of prolapse of bladder    • History of radiation therapy 03/03/2020    Pelvis   • Pinched nerve     back   • Spinal stenosis    • Wears dentures    • Wears glasses        Past Surgical History:   Procedure Laterality Date   • COLONOSCOPY  01/2020   • ENDOSCOPY     • EPIDURAL  02/2020   • HELLER MYOTOMY LAPAROSCOPIC WITH ENDOSCOPY  2012   •  TOTAL LAPAROSCOPIC HYSTERECTOMY SALPINGO OOPHORECTOMY Bilateral 11/15/2019    Procedure: TYPE 1 RADICAL TOTAL LAPAROSCOPIC HYSTERECTOMY BILATERAL SALPINGOOPHORECTOMY, LYSIS OF ADHESTIONS, BILATERAL PELVIC LYMPH NODE DISSECTION WITH DAVINCI ROBOT;  Surgeon: Zuly Evans MD;  Location: [facility];  Service: DaVinci   • UPPER GASTROINTESTINAL ENDOSCOPY  01/2020       MEDICATIONS: Hospital medication list reviewed    Allergies:  is allergic to augmentin [amoxicillin-pot clavulanate] and biaxin [clarithromycin].    Social History:   Social History     Socioeconomic History   • Marital status:      Spouse name: Not on file   • Number of children: 2   • Years of education: Not on file   • Highest education level: Not on file   Tobacco Use   • Smoking status: Current Every Day Smoker     Packs/day: 1.00     Years: 30.00     Pack years: 30.00     Types: Cigarettes   • Smokeless tobacco: Never Used   Vaping Use   • Vaping Use: Former   • Substances: Nicotine, Flavoring   • Devices: Pre-filled or refillable cartridge, Pre-filled pod   Substance and Sexual Activity   • Alcohol use: No   • Drug use: No   • Sexual activity: Defer       Family History:    Family History   Problem Relation Age of Onset   • Heart attack Mother    • Uterine cancer Mother 84   • Lung cancer Brother    • Colon cancer Half-Sister 50   • Colon cancer Paternal Aunt    • Colon cancer Cousin    • Heart attack Brother    • Hypertension Daughter    • Hypertension Son        Health Maintenance:    Health Maintenance   Topic Date Due   • MAMMOGRAM  Never done   • URINE MICROALBUMIN  Never done   • DXA SCAN  Never done   • ANNUAL PHYSICAL  Never done   • COVID-19 Vaccine (1) Never done   • TDAP/TD VACCINES (1 - Tdap) Never done   • Pneumococcal Vaccine 65+ (1 of 1 - PPSV23) Never done   • ZOSTER VACCINE (2 of 3) 07/25/2016   • HEPATITIS C SCREENING  Never done   • DIABETIC FOOT EXAM  Never done   • DIABETIC EYE EXAM  Never done   • HEMOGLOBIN A1C   05/14/2020   • INFLUENZA VACCINE  08/01/2021   • LUNG CANCER SCREENING  04/17/2022   • COLONOSCOPY  01/21/2030       Review of Systems   Constitutional: Positive for appetite change (poor appetite), fatigue. Negative for chills and fever.          HENT: Positive for congestion, postnasal drip. Negative for ear discharge, ear pain, hearing loss, mouth sores, nosebleeds, rhinorrhea, sinus pain, sore throat, tinnitus and trouble swallowing.    Eyes: Positive for itching and visual disturbance (blurred vision). Negative for pain.   Respiratory: Positive for cough and shortness of breath. Negative for wheezing.    Cardiovascular: Positive for chest pain on admission, resolved now  Gastrointestinal: Negative for abdominal pain, blood in stool, constipation, diarrhea, nausea and vomiting.        Heartburn   Endocrine: Negative for heat intolerance   Genitourinary: Negative for difficulty urinating, flank pain, frequency, hematuria and urgency.        History of urinary and fecal incontinence   Musculoskeletal: Positive for arthralgias (bilateral shoulders), back pain (pinched nerve), gait problem (limping), joint swelling and myalgias.   Skin: Negative for color change, rash and wound.   Allergic/Immunologic: Negative for immunocompromised state.   Neurological: Positive for numbness. Negative for dizziness, seizures, syncope, weakness and headaches.   Hematological: Negative for adenopathy. Bruises/bleeds easily.   Psychiatric/Behavioral: Positive for dysphoric mood. Negative for agitation, confusion and sleep disturbance. The patient is not nervous/anxious.    All other systems reviewed and are negative.    Physical Exam    Vitals:    04/18/21 0946 04/18/21 1006 04/18/21 1018 04/18/21 1119   BP: 106/54 113/53 108/53 110/62   BP Location:    Left arm   Patient Position:    Lying   Pulse:  78  82   Resp: 16 16 18 18   Temp: 97.5 °F (36.4 °C) 98.5 °F (36.9 °C) 98.1 °F (36.7 °C) 97.9 °F (36.6 °C)   TempSrc: Oral Oral Oral  Oral   SpO2:       Weight:       Height:         Body mass index is 20.43 kg/m².    Wt Readings from Last 3 Encounters:   04/18/21 54 kg (119 lb)   04/14/21 54.4 kg (120 lb)   04/13/21 53.5 kg (118 lb)     GENERAL: Alert, thin female appearing her stated age who is in no apparent distress.   HEENT: Sclera anicteric. Head normocephalic, atraumatic. Mucus membranes moist.   NECK: Trachea midline, supple, without masses.   CARDIOVASCULAR: Normal rate, regular rhythm,+ systolic ejection murmur, no rubs, or gallops.  No peripheral edema.  RESPIRATORY: Clear to auscultation bilaterally, normal respiratory effort with occasional cough  BACK:  No CVA tenderness, no vertebral tenderness on palpation  GASTROINTESTINAL:  Abdomen is soft, non-tender, non-distended, no rebound or guarding, no masses, or hernias. No HSM.  SKIN:  Warm, dry, well-perfused.  All visible areas intact.  Ecchymosis left scalp.  Left sided port.  PSYCHIATRIC: AO x3, with appropriate affect, normal thought processes.  NEUROLOGIC: No focal deficits.   MUSCULOSKELETAL: Examined while in bed.  EXTREMITIES:   No cyanosis, clubbing, symmetric.  LYMPHATICS:  No cervical or inguinal adenopathy noted.  Mild supraclavicular fullness on the left when compared to right.     PELVIC exam: Deferred    ECOG PS 2    PROCEDURES: None      Diagnostic Data:   XR Chest 1 View    Result Date: 4/18/2021  Interval decrease seen in size of the masslike densities within the lung fields bilaterally, left greater than right. Portacatheter identified on the left with tip in the SVC. No superimposed infectious process present.  DICTATED:   04/17/2021 EDITED/ls :   04/18/2021  This report was finalized on 4/18/2021 11:53 AM by Dr. Kalani Laurent MD.      CT Angiogram Chest    Result Date: 4/17/2021  There is marked progression of metastatic disease in the chest since the previous examination. The segmental pulmonary artery to the right middle lobe is encased by tumor and narrowed  as a result. This appears similar to the previous examination. No evidence of pulmonary embolism. Signer Name: Austin Sheets MD  Signed: 4/17/2021 8:30 PM  Workstation Name: [id number]  [facility]        Lab Results   Component Value Date    WBC 1.77 (C) 04/18/2021    HGB 6.1 (C) 04/18/2021    HCT 19.3 (C) 04/18/2021    MCV 93.2 04/18/2021     (L) 04/18/2021    NEUTROABS 1.06 (L) 04/18/2021    GLUCOSE 77 04/18/2021    BUN 37 (H) 04/18/2021    CREATININE 0.65 04/18/2021    EGFRIFNONA 90 04/18/2021     04/18/2021    K 3.4 (L) 04/18/2021     04/18/2021    CO2 27.0 04/18/2021    MG 1.5 (L) 04/18/2021    PHOS 3.0 04/18/2021    CALCIUM 8.8 04/18/2021    ALBUMIN 3.20 (L) 04/18/2021    AST 17 04/18/2021    ALT 12 04/18/2021    BILITOT 0.3 04/18/2021     No results found for:         Assessment/Plan   This is a 71 y.o. woman with biopsy-proven recurrent endometrial cancer with lung metastasis and mediastinal adenopathy now admitted for pancytopenia, dyspnea with chest pain    History of endometrial cancer as detailed above, now hospitalized with pancytopenia, significant progression on chest despite initial response to chemotherapy  Agree with transfusion support for thrombocytopenia and anemia with continued hospitalization until counts stabilize, symptoms improved.  Patient's chemotherapy is complete.  We discussed the possibility of Keytruda versus Keytruda plus lenvatinib for ongoing treatment.  At this point, patient does not want to undergo palliative endeavors.  I think it is reasonable given her overall performance status that she resumes treatment watch this acute illness has improved.  Prior to this episode, she had been working.  Will not pursue Arimidex at this point.  -Status post platelet and blood transfusion.  No G-CSF indicated at this point.  We discussed the importance of fall precautions with significant thrombocytopenia.    Dyspnea with chest pain  Covid  negative  Symptoms improving with supportive management, blood transfusion, reflux treatment    Nausea, dehydration  Improved with supportive measures    Zuly Evans MD  04/18/21  15:34 EDT                                                 Electronically signed by Zuly Evans MD at 04/18/21 0214

## 2021-04-19 NOTE — PROGRESS NOTES
Ele Strickland  [id number]  1950      Reason for consultation: History of endometrial cancer, now with biopsy-proven lung metastasis, hospital admission for pancytopenia, weakness, dyspnea with chest pain, dehydration  Original referring provider:  Kaylen Taylor DO     History of present illness:  The patient is a 71 y.o. year old female who was admitted to the hospital 2021 after emergency room visit.  She had called me and was complaining of chest pain and shortness of breath.      Today, patient appears to be doing well.  Is sitting up on arrival, on room air.  States that her pain is well controlled with oral pain medication.  Insistent that she must go home today.  Says she is tolerating normal diet having minimal acid reflux.  States she overall feels well, and that pain is improved since arrival.    On presentation, she is noted to have significant thrombocytopenia with platelet counts of 11.  She was admitted and underwent transfusion.  She has had a good initial response and her platelets although worsening anemia related to chemotherapy and correction of dehydration.Patient notes she hadn't felt well for a couple of weeks.  Denied any melena or bright red blood per rectum.      OBGYN History:  She is a .  She does not currently use HRT, but was briefly on progesterone around the time that she went through menopause. She does not have a history of abnormal pap smears.    Oncologic History:  Oncology/Hematology History   Endometrial cancer (CMS/HCC)   10/23/2019 Initial Diagnosis    Patient referred to Gyn Oncology due to postmenopausal bleeding x 10 months and pap smear showing atypical glandular cells with negative HPV.   Endometrial biopsy obtained. Pathology revealed grade 2 endometrioid adenocarcinoma.      2019 Imaging    Pre-op CT abdomen pelvis showed lobular hypoenhancing mass in the uterus and the cervix, presumably known neoplasm. Incidental right lobe liver hemangioma noted,  unchanged from previous imaging. No evidence of metastatic disease.      11/15/2019 Surgery    Type 1 radical RTLH/BSO, lysis of adhesions, and bilateral pelvic lymph node dissection.     Final pathology showed 9.5 x 6.0 x 2.5 cm grade 2 EAC with focal mucinous and secretory features, invasive into >90% of the myometrium. Tumor invades cervical stroma. No lymphvascular invasion and nodes negative.  MSI by IHC showed absent MLH1 and PMS2, reflex hypermethylation positive = sporadic tumor.  Stage II grade 2     1/24/2020 - 3/11/2020 Radiation    Adjuvant external beam radiation plus vaginal brachytherapy.  The pelvis received a dose of 45 Gy in 25 fractions, followed by a boost to the apex of the vagina with 12 Gy in 2 fractions.  Treatments tolerated well.      5/7/2020 Survivorship    Survivorship Care Plan completed and discussed with patient.  Copy of Survivorship Care Plan provided to patient and primary care provider.     12/14/2020 Molecular Testing    CARIS results:  MMR deficient/MSI high--level 1 for pembrolizumab  ER positive 2+, 85%; GA positive 2+, 65%  ARID1A pathogenic variant, PTEN pathogenic variant  BRCA 1/2 negative, PD-L1 negative     12/22/2020 -  Chemotherapy    Dose modification of both carboplatin and paclitaxel following cycle #2 related to weight loss, neuropathy  CT after cycle #4 showed partial response in chest  OP ENDOMETRIAL PACLitaxel / CARBOplatin (Q21D)           Past Medical History:   Diagnosis Date    Achalasia, esophageal     Anxiety and depression     Arthritis     Chronic back pain     Chronic bronchitis (CMS/HCC)     COPD (chronic obstructive pulmonary disease) (CMS/HCC)     no home O2    Diabetes (CMS/HCC)     Endometrial cancer (CMS/HCC) 11/2019    Stage II    Essential hypertension 7/5/2019    Frequent urination     Gall stones     GERD (gastroesophageal reflux disease)     Heart murmur     High blood pressure     History of brachytherapy 03/11/2020    vagina    History of  prolapse of bladder     History of radiation therapy 03/03/2020    Pelvis    Pinched nerve     back    Spinal stenosis     Wears dentures     Wears glasses        Past Surgical History:   Procedure Laterality Date    COLONOSCOPY  01/2020    ENDOSCOPY      EPIDURAL  02/2020    HELLER MYOTOMY LAPAROSCOPIC WITH ENDOSCOPY  2012    TOTAL LAPAROSCOPIC HYSTERECTOMY SALPINGO OOPHORECTOMY Bilateral 11/15/2019    Procedure: TYPE 1 RADICAL TOTAL LAPAROSCOPIC HYSTERECTOMY BILATERAL SALPINGOOPHORECTOMY, LYSIS OF ADHESTIONS, BILATERAL PELVIC LYMPH NODE DISSECTION WITH DAVINCI ROBOT;  Surgeon: Zuly Evans MD;  Location: [facility];  Service: DaVinci    UPPER GASTROINTESTINAL ENDOSCOPY  01/2020       MEDICATIONS: Hospital medication list reviewed    Allergies:  is allergic to augmentin [amoxicillin-pot clavulanate] and biaxin [clarithromycin].    Social History:   Social History     Socioeconomic History    Marital status:      Spouse name: Not on file    Number of children: 2    Years of education: Not on file    Highest education level: Not on file   Tobacco Use    Smoking status: Current Every Day Smoker     Packs/day: 1.00     Years: 30.00     Pack years: 30.00     Types: Cigarettes    Smokeless tobacco: Never Used   Vaping Use    Vaping Use: Former    Substances: Nicotine, Flavoring    Devices: Pre-filled or refillable cartridge, Pre-filled pod   Substance and Sexual Activity    Alcohol use: No    Drug use: No    Sexual activity: Defer       Family History:    Family History   Problem Relation Age of Onset    Heart attack Mother     Uterine cancer Mother 84    Lung cancer Brother     Colon cancer Half-Sister 50    Colon cancer Paternal Aunt     Colon cancer Cousin     Heart attack Brother     Hypertension Daughter     Hypertension Son        Health Maintenance:    Health Maintenance   Topic Date Due    MAMMOGRAM  Never done    URINE MICROALBUMIN  Never done    DXA SCAN  Never done    ANNUAL PHYSICAL  Never done     COVID-19 Vaccine (1) Never done    TDAP/TD VACCINES (1 - Tdap) Never done    Pneumococcal Vaccine 65+ (1 of 1 - PPSV23) Never done    ZOSTER VACCINE (2 of 3) 07/25/2016    HEPATITIS C SCREENING  Never done    DIABETIC FOOT EXAM  Never done    DIABETIC EYE EXAM  Never done    HEMOGLOBIN A1C  05/14/2020    INFLUENZA VACCINE  08/01/2021    LUNG CANCER SCREENING  04/17/2022    COLONOSCOPY  01/21/2030       Review of Systems   Constitutional: Positive for appetite change (poor appetite), fatigue. Negative for chills and fever.          HENT: Positive for congestion, postnasal drip. Negative for ear discharge, ear pain, hearing loss, mouth sores, nosebleeds, rhinorrhea, sinus pain, sore throat, tinnitus and trouble swallowing.    Eyes: Positive for itching and visual disturbance (blurred vision). Negative for pain.   Respiratory: Positive for cough and shortness of breath. Negative for wheezing.    Cardiovascular: Positive for chest pain on admission, resolved now  Gastrointestinal: Negative for abdominal pain, blood in stool, constipation, diarrhea, nausea and vomiting.        Heartburn   Endocrine: Negative for heat intolerance   Genitourinary: Negative for difficulty urinating, flank pain, frequency, hematuria and urgency.        History of urinary and fecal incontinence   Musculoskeletal: Positive for arthralgias (bilateral shoulders), back pain (pinched nerve), gait problem (limping), joint swelling and myalgias.   Skin: Negative for color change, rash and wound.   Allergic/Immunologic: Negative for immunocompromised state.   Neurological: Positive for numbness. Negative for dizziness, seizures, syncope, weakness and headaches.   Hematological: Negative for adenopathy. Bruises/bleeds easily.   Psychiatric/Behavioral: Positive for dysphoric mood. Negative for agitation, confusion and sleep disturbance. The patient is not nervous/anxious.    All other systems reviewed and are negative.    Physical Exam    Vitals:     04/19/21 0300 04/19/21 0401 04/19/21 0500 04/19/21 0543   BP:  167/73     BP Location:  Left arm     Patient Position:  Lying     Pulse: 76 75 80 77   Resp:  16     Temp:  97.5 °F (36.4 °C)     TempSrc:  Oral     SpO2:       Weight:    55.1 kg (121 lb 8 oz)   Height:         Body mass index is 20.86 kg/m².    Wt Readings from Last 3 Encounters:   04/19/21 55.1 kg (121 lb 8 oz)   04/14/21 54.4 kg (120 lb)   04/13/21 53.5 kg (118 lb)     GENERAL: Alert, thin female appearing her stated age who is in no apparent distress.   HEENT: Sclera anicteric. Head normocephalic, atraumatic. Mucus membranes moist.   NECK: Trachea midline, supple, without masses.   CARDIOVASCULAR: Normal rate, regular rhythm,+ systolic ejection murmur, no rubs, or gallops.  No peripheral edema.  RESPIRATORY: Clear to auscultation bilaterally, normal respiratory effort with occasional cough  BACK:  No CVA tenderness, no vertebral tenderness on palpation  GASTROINTESTINAL:  Abdomen is soft, non-tender, non-distended, no rebound or guarding, no masses, or hernias. No HSM.  SKIN:  Warm, dry, well-perfused.  All visible areas intact.  Ecchymosis left scalp.  Left sided port.  PSYCHIATRIC: AO x3, with appropriate affect, normal thought processes.  NEUROLOGIC: No focal deficits.   MUSCULOSKELETAL: Examined while in bed.  EXTREMITIES:   No cyanosis, clubbing, symmetric.  LYMPHATICS:  No cervical or inguinal adenopathy noted.  Mild supraclavicular fullness on the left when compared to right.     PELVIC exam: Deferred    ECOG PS 2    PROCEDURES: None      Diagnostic Data:   XR Chest 1 View    Result Date: 4/18/2021  Interval decrease seen in size of the masslike densities within the lung fields bilaterally, left greater than right. Portacatheter identified on the left with tip in the SVC. No superimposed infectious process present.  DICTATED:   04/17/2021 EDITED/ls :   04/18/2021  This report was finalized on 4/18/2021 11:53 AM by Dr. Kalani Laurent MD.       CT Angiogram Chest    Result Date: 4/17/2021  There is marked progression of metastatic disease in the chest since the previous examination. The segmental pulmonary artery to the right middle lobe is encased by tumor and narrowed as a result. This appears similar to the previous examination. No evidence of pulmonary embolism. Signer Name: Austin Sheets MD  Signed: 4/17/2021 8:30 PM  Workstation Name: [id number]  [facility]        Lab Results   Component Value Date    WBC 1.77 (C) 04/18/2021    HGB 6.1 (C) 04/18/2021    HCT 19.3 (C) 04/18/2021    MCV 93.2 04/18/2021     (L) 04/18/2021    NEUTROABS 1.06 (L) 04/18/2021    GLUCOSE 77 04/18/2021    BUN 37 (H) 04/18/2021    CREATININE 0.65 04/18/2021    EGFRIFNONA 90 04/18/2021     04/18/2021    K 4.7 04/18/2021     04/18/2021    CO2 27.0 04/18/2021    MG 1.5 (L) 04/18/2021    PHOS 3.0 04/18/2021    CALCIUM 8.8 04/18/2021    ALBUMIN 3.20 (L) 04/18/2021    AST 17 04/18/2021    ALT 12 04/18/2021    BILITOT 0.3 04/18/2021     No results found for:         Assessment/Plan   This is a 71 y.o. woman with biopsy-proven recurrent endometrial cancer with lung metastasis and mediastinal adenopathy now admitted for pancytopenia, dyspnea with chest pain    History of endometrial cancer as detailed above, now hospitalized with pancytopenia, significant progression on chest despite initial response to chemotherapy  -PLT 79 this morning, stable  - Fall precautions on discharge with significant thrombocytopenia.  -We will plan on Keytruda versus Keytruda plus lenvatinib after improvement from this acute illness.  -Status post platelet and blood transfusion.  No G-CSF indicated at this point    Neutropenia  - WBC 1.53, ANC 1.02  - febrile precautions on discharge    Dyspnea with chest pain  Covid negative  Symptoms improving with supportive management, blood transfusion, reflux treatment    Nausea, dehydration  Resolved    Dayanara MOORE  MD [place]  04/19/21  15:34 EDT    I saw and evaluated the patient. I agree with the findings and the plan of care as documented in the note.  Patient to follow-up with me 4/28/2021 for lab check and symptom review.  I discussed in detail the importance of having a support system with a diagnosis of a terminal cancer.  Her daughter lives in [place] and she does not like to bother her daughter because she works long hours and has a busy work schedule.  I strongly advised the patient that she needs to broaden her support system and have someone available who has a key to her residence.  Patient is adamant she goes home since her dog has not been let outside for 2 days.  Febrile precautions and fall precautions were discussed.  The patient is okay for discharge otherwise from my standpoint.  Will likely need some more aggressive management of her reflux as an outpatient.    Zuly Evans MD  04/19/21  11:17 EDT

## 2021-04-19 NOTE — PLAN OF CARE
Goal Outcome Evaluation:        Outcome Summary: VSS. Pt has c/o pain and nausea this shift relieved with PRN Norco and phentermine. Remains on RA. A&O. Will continue to monitor.

## 2021-04-19 NOTE — DISCHARGE SUMMARY
[facility]  DISCHARGE SUMMARY    Patient Name: Ele Strickland  : 1950  MRN: [id number]    Date of Admission: 2021  5:56 PM  Date of Discharge:  21  Primary Care Physician: Sharon Acosta DO    Consults     Date and Time Order Name Status Description    2021 10:49 PM Inpatient Gynecologic Oncology Consult Completed           Hospital Course     Presenting Problem:   Shortness of breath [R06.02]    Active Hospital Problems    Diagnosis  POA   • Aortic stenosis [I35.0]  Yes   • Shortness of breath [R06.02]  Yes   • Thrombocytopenia (CMS/HCC) [D69.6]  Yes   • Anemia due to chemotherapy [D64.81, T45.1X5A]  Yes   • Metastasis to lung (CMS/HCC) [C78.00]  Yes   • Endometrial cancer (CMS/HCC) [C54.1]  Yes   • GERD (gastroesophageal reflux disease) [K21.9]  Yes   • Chronic obstructive lung disease (CMS/HCC) [J44.9]  Yes   • Type 2 diabetes mellitus without complication (CMS/HCC) [E11.9]  Yes      Resolved Hospital Problems   No resolved problems to display.          Hospital Course:  Ele Strickland is a 71 y.o. female with known endometrial cancer currently being treated with chemo last being , presents with SOA on exertion and CP.      Gen weakness  Pancytopenia   - likely 2nd to recent chemo  - s/p 2 units of platelets and 2 units of PRBC   - Continued ferrous sulfate  -Blood counts stable on discharge and has follow up next week with Dr. Evans to monitor.       Dyspnea   CP   - COVID neg; CTA no PE, progression of metastatic diease, segmental pulmonary artery to RML encased with tumor and narrowed - similar to previous   - Trop neg x 2; EKG reviewed   - Anemia and GERD may be contributing; she also has metastatic disease in the periphery of the left lung that could be contributing to pain     -  Currently pain is improved. Patient requesting discharge home. Close follow up with PCP and Dr. Evans      Endometrial carcinoma, diagnosed in 2019,  with mets to chest, hilar adenopathy   -follows up with Dr. Crowell   -s/p surgery, brachytherapy, s/p chemo-carbo/Taxol-last dose on 4/7/2021., Arimidex 1 mg daily, Decadron  -Currently discontinued Arimidex   - Patient has follow up 4/28 to discuss next steps and monitor blood counts. Dr. Evans reviewed febrile precautions to patient      GERD/achlasia-Nexium 40 mg at home  - Will increase to nexium 40 mg BID x 1 month due to worsening reflux symptoms      Neuropathy-Neurontin 300 mg every 8 hrs     DM 2-metformin 1000 mg every 12 hrs at home  - she was on SSI while inpatient; will resume on discharge      Bladder dysfunction - continued Ditropan 5 mg p.o. daily     Hyperlipidemia- continued Zocor 40 mg p.o. daily     Hypertension - dyazide held inpatient; will resume on discharge; tolerated PO intake      Smoker-nicotine, smoking cessation advice given     Aortic Stenosis   -- last ECHO 2019 with moderate stenosis   -SOA currently improved; consider repeat ECHO if becomes an ongoing problem        Discharge Follow Up Recommendations for outpatient labs/diagnostics:  PCP Dr. Acosta 1 week  Gyn/onc Dr. Evans 4/28/21       Day of Discharge     HPI:   States she wants to go home. Thinks her left sided paid is improved but still a bit sore when she moves.  Has a pet at home that she has to get back to. Reviewed close follow up. Dr. Evans reviewed febrile precautions and expanding her support system. Answered all questions to the best of my ability.     Review of Systems  Gen- No fevers, chills  CV- No chest pain, palpitations  Resp- No cough, + dyspnea - improved   GI- No N/V/D, abd pain    Vital Signs:   Temp:  [97 °F (36.1 °C)-99.1 °F (37.3 °C)] 99.1 °F (37.3 °C)  Heart Rate:  [71-98] 83  Resp:  [16-18] 18  BP: (112-167)/(57-77) 163/77     Physical Exam:  Constitutional: No acute distress, awake, alert; chronically ill appearing   HENT: NCAT, mucous membranes moist  Respiratory: Clear to auscultation  bilaterally, respiratory effort normal   Cardiovascular: RRR, IV/VI murmur   Gastrointestinal: Positive bowel sounds, soft, nontender, nondistended  Musculoskeletal: No bilateral ankle edema  Psychiatric: Appropriate affect, cooperative  Neurologic: Oriented x 3, strength symmetric in all extremities, Cranial Nerves grossly intact to confrontation, speech clear  Skin: No rashes    Pertinent  and/or Most Recent Results     LAB RESULTS:      Lab 04/19/21  0758 04/18/21  0626 04/17/21 1805 04/13/21  1517   WBC 1.53* 1.77* 3.81 4.00   HEMOGLOBIN 8.5* 6.1* 8.7* 9.5*   HEMATOCRIT 26.9* 19.3* 26.8* 29.1*   PLATELETS 79* 113* 11* 44*   NEUTROS ABS 1.02* 1.06* 2.79 3.20   IMMATURE GRANS (ABS) 0.03 0.01 0.02  --    LYMPHS ABS 0.33* 0.53* 0.73 0.70   MONOS ABS 0.13 0.17 0.25 0.10   EOS ABS 0.01 0.00 0.01  --    MCV 92.4 93.2 90.8 90.5   PROTIME  --  12.7  --   --    APTT  --  31.1  --   --          Lab 04/19/21  0600 04/18/21  1551 04/18/21  0626 04/17/21 1805 04/13/21  1517   SODIUM 139  --  140 137 136   POTASSIUM 4.2 4.7 3.4* 4.2 4.1   CHLORIDE 107  --  104 100 98   CO2 22.0  --  27.0 26.0 22.0   ANION GAP 10.0  --  9.0 11.0 16.0*   BUN 22  --  37* 42* 35*   CREATININE 0.81  --  0.65 0.76 0.89   GLUCOSE 112*  --  77 133* 112*   CALCIUM 8.1*  --  8.8 9.5 9.3   MAGNESIUM 2.0  --  1.5*  --   --    PHOSPHORUS  --   --  3.0  --   --          Lab 04/18/21  0626 04/17/21  1805 04/13/21  1517   TOTAL PROTEIN 5.6* 6.6 6.3   ALBUMIN 3.20* 3.80 4.00   GLOBULIN 2.4 2.8 2.3   ALT (SGPT) 12 11 14   AST (SGOT) 17 20 17   BILIRUBIN 0.3 0.3 0.3   ALK PHOS 72 90 97   LIPASE  --  30  --          Lab 04/18/21 0626 04/17/21 1957 04/17/21  1805   PROBNP  --   --  609.3   TROPONIN T  --  <0.010 <0.010   PROTIME 12.7  --   --    INR 0.98  --   --              Lab 04/18/21 0626 04/17/21 2320 04/13/21  1604   IRON 94  --   --    IRON SATURATION 44  --   --    TIBC 213*  --   --    TRANSFERRIN 143*  --   --    FERRITIN 499.80*  --   --    ABO  TYPING  --  A A   RH TYPING  --  Negative Negative   ANTIBODY SCREEN  --  Negative Negative         Brief Urine Lab Results  (Last result in the past 365 days)      Color   Clarity   Blood   Leuk Est   Nitrite   Protein   CREAT   Urine HCG        01/04/21 1610 Yellow Clear Negative Negative Negative Negative             Microbiology Results (last 10 days)     Procedure Component Value - Date/Time    COVID PRE-OP / PRE-PROCEDURE SCREENING ORDER (NO ISOLATION) - Swab, Nasopharynx [[id number]]  (Normal) Collected: 04/17/21 2229    Lab Status: Final result Specimen: Swab from Nasopharynx Updated: 04/17/21 2325    Narrative:      The following orders were created for panel order COVID PRE-OP / PRE-PROCEDURE SCREENING ORDER (NO ISOLATION) - Swab, Nasopharynx.  Procedure                               Abnormality         Status                     ---------                               -----------         ------                     COVID-19,CEPHEID,[facility]OHIT IN-...[[id number]]  Normal              Final result                 Please view results for these tests on the individual orders.    COVID-19,CEPHEID,ROHIT IN-HOUSE(OR EMERGENT/ADD-ON),NP SWAB IN TRANSPORT MEDIA 3-4 HR TAT - Swab, Nasopharynx [[id number]]  (Normal) Collected: 04/17/21 2229    Lab Status: Final result Specimen: Swab from Nasopharynx Updated: 04/17/21 2325     COVID19 Not Detected    Narrative:      Fact sheet for providers: https://www.fda.gov/media/839929/download     Fact sheet for patients: https://www.fda.gov/media/379066/download          XR Chest 1 View    Result Date: 4/18/2021   EXAMINATION: XR CHEST 1 VW - 04/17/2021  INDICATION: Chest Pain triage protocol  COMPARISON: 01/11/2021  FINDINGS: Portable chest reveals cardiac and mediastinal silhouettes within normal limits. Lung fields reveal interval improvement seen in the appearance of the masslike densities within the lung fields bilaterally. The heart is upper limits of normal in size. There is no  pleural effusion or pneumothorax. Degenerative changes seen within the spine. Portacatheter identified on the left with tip in the SVC.      Interval decrease seen in size of the masslike densities within the lung fields bilaterally, left greater than right. Portacatheter identified on the left with tip in the SVC. No superimposed infectious process present.  DICTATED:   04/17/2021 EDITED/ls :   04/18/2021  This report was finalized on 4/18/2021 11:53 AM by Dr. Kalani Laurent MD.      CT Angiogram Chest    Result Date: 4/17/2021  CTA Chest INDICATION: Metastatic disease in the lungs. Shortness of breath prior to arrival. TECHNIQUE: CT angiogram of the chest with 100 cc of Isovue-300 IV contrast. 3-D reconstructions were obtained and reviewed.   Radiation dose reduction techniques included automated exposure control or exposure modulation based on body size. Count of known CT and cardiac nuc med studies performed in previous 12 months: 3. COMPARISON: 3-21 FINDINGS: Chest images at mediastinal window show no pulmonary artery filling defects to suggest emboli. There is extensive mediastinal and hilar adenopathy especially the right hilum. The lymph nodes at the right hilum partially encase and narrow the segmental pulmonary artery branch to the right middle lobe. No pleural or pericardial fluid is seen. There has been marked progression of metastatic disease in the chest since the previous examination. As a typical example, the diameter of the nodes at the right hilum measures 3.5 cm compared with 2.8 cm on the previous exam. A node in the aortopulmonic window on the left measures 2.4 x 3 cm in transverse diameter compared with 0.8 x 2.8 cm on the previous exam. Chest images at lung window show multiple lung masses, all of which are significantly larger since previous examination. As a typical example, there is a mass posteriorly in the left lower lobe measuring 2.3 x 2.7 cm. On the previous scan it measured 1.6 x  1.7 cm. Other mass lesions show similar progression.     There is marked progression of metastatic disease in the chest since the previous examination. The segmental pulmonary artery to the right middle lobe is encased by tumor and narrowed as a result. This appears similar to the previous examination. No evidence of pulmonary embolism. Signer Name: Austin Sheets MD  Signed: 4/17/2021 8:30 PM  Workstation Name: [facility]  [facility]              Results for orders placed during the hospital encounter of 10/30/19    Adult Transthoracic Echo Complete W/ Cont if Necessary Per Protocol    Interpretation Summary  · Left ventricular systolic function is normal. Calculated EF = 58.0%. Estimated EF appears to be in the range of 56 - 60%.  · There is moderate aortic valve calcification. There is moderate aortic stenosis. Peak gradient 59 mmHg, mean gradient 32 mmHg. Dimensionless index 0.4. Calculated aortic valve area 1.13 cm²  · Mild to moderate aortic regurgitation with a pressure half-time of 577 ms. Vena contract of 0.4 cm  · Sigmoid-shaped ventricular septum is present  · Left ventricular diastolic (grade I) consistent with impaired relaxation.  · Mild tricuspid valve regurgitation is present. Estimated right ventricular systolic pressure from tricuspid regurgitation is mildly elevated (35-45 mmHg).      Plan for Follow-up of Pending Labs/Results:     Discharge Details        Discharge Medications      Changes to Medications      Instructions Start Date   esomeprazole 40 MG capsule  Commonly known as: nexIUM  What changed: when to take this   40 mg, Oral, 2 times daily         Continue These Medications      Instructions Start Date   albuterol sulfate  (90 Base) MCG/ACT inhaler  Commonly known as: PROVENTIL HFA;VENTOLIN HFA;PROAIR HFA   2 puffs, Inhalation, Every 4 Hours PRN      aspirin 81 MG EC tablet   81 mg, Oral, Daily      DULoxetine 60 MG capsule  Commonly known as: CYMBALTA    TAKE 1 CAPSULE BY MOUTH ONCE DAILY FOR 90 DAYS      EXCEDRIN PO   Oral, As Needed      ferrous sulfate 324 (65 Fe) MG tablet delayed-release EC tablet   324 mg, Oral, Daily With Breakfast      gabapentin 300 MG capsule  Commonly known as: NEURONTIN   300 mg, Oral, 3 Times Daily      lidocaine-prilocaine 2.5-2.5 % cream  Commonly known as: EMLA   Topical, As Needed      loratadine 10 MG tablet  Commonly known as: CLARITIN   10 mg, Oral, Daily      metFORMIN 1000 MG tablet  Commonly known as: GLUCOPHAGE   1,000 mg, Oral, 2 Times Daily      nystatin 237229 UNIT/GM powder  Commonly known as: MYCOSTATIN   Topical, 3 Times Daily      ondansetron 8 MG tablet  Commonly known as: ZOFRAN   8 mg, Oral, 3 Times Daily PRN      oxybutynin XL 5 MG 24 hr tablet  Commonly known as: DITROPAN-XL   5 mg, Oral, Daily      promethazine 25 MG tablet  Commonly known as: PHENERGAN   25 mg, Oral, Every 6 Hours PRN      simvastatin 40 MG tablet  Commonly known as: ZOCOR   40 mg, Oral, Daily      triamterene-hydrochlorothiazide 37.5-25 MG per capsule  Commonly known as: DYAZIDE   1 capsule, Oral, Every Morning         Stop These Medications    anastrozole 1 MG tablet  Commonly known as: ARIMIDEX     dexamethasone 4 MG tablet  Commonly known as: DECADRON            Allergies   Allergen Reactions   • Augmentin [Amoxicillin-Pot Clavulanate] Nausea And Vomiting   • Biaxin [Clarithromycin] Hallucinations         Discharge Disposition:  Home or Self Care    Diet:  Hospital:  Diet Order   Procedures   • Diet Regular; Low Sodium, Consistent Carbohydrate       Activity:  Activity Instructions     Activity as Tolerated            Restrictions or Other Recommendations:       CODE STATUS:    Code Status and Medical Interventions:   Ordered at: 04/17/21 9340     Level Of Support Discussed With:    Patient     Code Status:    CPR     Medical Interventions (Level of Support Prior to Arrest):    Full       Future Appointments   Date Time Provider Department  [place]   4/28/2021  1:30 PM CHAIR 5  [facility]   4/28/2021  2:30 PM Zuly Evans MD [facility]   5/5/2021  9:30 AM CHAIR 21  [facility]   5/14/2021  9:00 AM ROH[facility] CT 1  [facility] CT [facility]   7/7/2021  9:30 AM Zuly Evans MD [facility]       Additional Instructions for the Follow-ups that You Need to Schedule     Discharge Follow-up with PCP   As directed       Currently Documented PCP:    Sharon Acosta DO    PCP Phone Number:    [phone]     Follow Up Details: PCP Dr. Acosta 1 week         Discharge Follow-up with Specified Provider: Gynecology Oncology Dr. Evans 4/28/21 as scheduled   As directed      To: Gynecology Oncology Dr. Evans 4/28/21 as scheduled                     Rupa Fernandez DO  04/19/21      Time Spent on Discharge:  I spent  35  minutes on this discharge activity which included: face-to-face encounter with the patient, reviewing the data in the system, coordination of the care with the nursing staff as well as consultants, documentation, and entering orders.

## 2021-04-19 NOTE — CONSULTS
Ele Strickland  [id number]  1950      Reason for consultation: History of endometrial cancer, now with biopsy-proven lung metastasis, hospital admission for pancytopenia, weakness, dyspnea with chest pain, dehydration  Original referring provider:  Kaylen Taylor DO     History of present illness:  The patient is a 71 y.o. year old female who was admitted to the hospital 2021 after emergency room visit.  She had called me and was complaining of chest pain and shortness of breath.      Today, patient appears to be doing well.  Is sitting up on arrival, on room air.  States that her pain is well controlled with oral pain medication.  Insistent that she must go home today.  Says she is tolerating normal diet having minimal acid reflux.  States she overall feels well, and that pain is improved since arrival.    On presentation, she is noted to have significant thrombocytopenia with platelet counts of 11.  She was admitted and underwent transfusion.  She has had a good initial response and her platelets although worsening anemia related to chemotherapy and correction of dehydration.Patient notes she hadn't felt well for a couple of weeks.  Denied any melena or bright red blood per rectum.      OBGYN History:  She is a .  She does not currently use HRT, but was briefly on progesterone around the time that she went through menopause. She does not have a history of abnormal pap smears.    Oncologic History:  Oncology/Hematology History   Endometrial cancer (CMS/HCC)   10/23/2019 Initial Diagnosis    Patient referred to Gyn Oncology due to postmenopausal bleeding x 10 months and pap smear showing atypical glandular cells with negative HPV.   Endometrial biopsy obtained. Pathology revealed grade 2 endometrioid adenocarcinoma.      2019 Imaging    Pre-op CT abdomen pelvis showed lobular hypoenhancing mass in the uterus and the cervix, presumably known neoplasm. Incidental right lobe liver hemangioma noted,  unchanged from previous imaging. No evidence of metastatic disease.      11/15/2019 Surgery    Type 1 radical RTLH/BSO, lysis of adhesions, and bilateral pelvic lymph node dissection.     Final pathology showed 9.5 x 6.0 x 2.5 cm grade 2 EAC with focal mucinous and secretory features, invasive into >90% of the myometrium. Tumor invades cervical stroma. No lymphvascular invasion and nodes negative.  MSI by IHC showed absent MLH1 and PMS2, reflex hypermethylation positive = sporadic tumor.  Stage II grade 2     1/24/2020 - 3/11/2020 Radiation    Adjuvant external beam radiation plus vaginal brachytherapy.  The pelvis received a dose of 45 Gy in 25 fractions, followed by a boost to the apex of the vagina with 12 Gy in 2 fractions.  Treatments tolerated well.      5/7/2020 Survivorship    Survivorship Care Plan completed and discussed with patient.  Copy of Survivorship Care Plan provided to patient and primary care provider.     12/14/2020 Molecular Testing    CARIS results:  MMR deficient/MSI high--level 1 for pembrolizumab  ER positive 2+, 85%; OK positive 2+, 65%  ARID1A pathogenic variant, PTEN pathogenic variant  BRCA 1/2 negative, PD-L1 negative     12/22/2020 -  Chemotherapy    Dose modification of both carboplatin and paclitaxel following cycle #2 related to weight loss, neuropathy  CT after cycle #4 showed partial response in chest  OP ENDOMETRIAL PACLitaxel / CARBOplatin (Q21D)           Past Medical History:   Diagnosis Date   • Achalasia, esophageal    • Anxiety and depression    • Arthritis    • Chronic back pain    • Chronic bronchitis (CMS/HCC)    • COPD (chronic obstructive pulmonary disease) (CMS/HCC)     no home O2   • Diabetes (CMS/HCC)    • Endometrial cancer (CMS/HCC) 11/2019    Stage II   • Essential hypertension 7/5/2019   • Frequent urination    • Gall stones    • GERD (gastroesophageal reflux disease)    • Heart murmur    • High blood pressure    • History of brachytherapy 03/11/2020    vagina    • History of prolapse of bladder    • History of radiation therapy 03/03/2020    Pelvis   • Pinched nerve     back   • Spinal stenosis    • Wears dentures    • Wears glasses        Past Surgical History:   Procedure Laterality Date   • COLONOSCOPY  01/2020   • ENDOSCOPY     • EPIDURAL  02/2020   • HELLER MYOTOMY LAPAROSCOPIC WITH ENDOSCOPY  2012   • TOTAL LAPAROSCOPIC HYSTERECTOMY SALPINGO OOPHORECTOMY Bilateral 11/15/2019    Procedure: TYPE 1 RADICAL TOTAL LAPAROSCOPIC HYSTERECTOMY BILATERAL SALPINGOOPHORECTOMY, LYSIS OF ADHESTIONS, BILATERAL PELVIC LYMPH NODE DISSECTION WITH DAVINCI ROBOT;  Surgeon: Zuly Evans MD;  Location: [facility];  Service: DaVinci   • UPPER GASTROINTESTINAL ENDOSCOPY  01/2020       MEDICATIONS: Hospital medication list reviewed    Allergies:  is allergic to augmentin [amoxicillin-pot clavulanate] and biaxin [clarithromycin].    Social History:   Social History     Socioeconomic History   • Marital status:      Spouse name: Not on file   • Number of children: 2   • Years of education: Not on file   • Highest education level: Not on file   Tobacco Use   • Smoking status: Current Every Day Smoker     Packs/day: 1.00     Years: 30.00     Pack years: 30.00     Types: Cigarettes   • Smokeless tobacco: Never Used   Vaping Use   • Vaping Use: Former   • Substances: Nicotine, Flavoring   • Devices: Pre-filled or refillable cartridge, Pre-filled pod   Substance and Sexual Activity   • Alcohol use: No   • Drug use: No   • Sexual activity: Defer       Family History:    Family History   Problem Relation Age of Onset   • Heart attack Mother    • Uterine cancer Mother 84   • Lung cancer Brother    • Colon cancer Half-Sister 50   • Colon cancer Paternal Aunt    • Colon cancer Cousin    • Heart attack Brother    • Hypertension Daughter    • Hypertension Son        Health Maintenance:    Health Maintenance   Topic Date Due   • MAMMOGRAM  Never done   • URINE MICROALBUMIN  Never done   • DXA  SCAN  Never done   • ANNUAL PHYSICAL  Never done   • COVID-19 Vaccine (1) Never done   • TDAP/TD VACCINES (1 - Tdap) Never done   • Pneumococcal Vaccine 65+ (1 of 1 - PPSV23) Never done   • ZOSTER VACCINE (2 of 3) 07/25/2016   • HEPATITIS C SCREENING  Never done   • DIABETIC FOOT EXAM  Never done   • DIABETIC EYE EXAM  Never done   • HEMOGLOBIN A1C  05/14/2020   • INFLUENZA VACCINE  08/01/2021   • LUNG CANCER SCREENING  04/17/2022   • COLONOSCOPY  01/21/2030       Review of Systems   Constitutional: Positive for appetite change (poor appetite), fatigue. Negative for chills and fever.          HENT: Positive for congestion, postnasal drip. Negative for ear discharge, ear pain, hearing loss, mouth sores, nosebleeds, rhinorrhea, sinus pain, sore throat, tinnitus and trouble swallowing.    Eyes: Positive for itching and visual disturbance (blurred vision). Negative for pain.   Respiratory: Positive for cough and shortness of breath. Negative for wheezing.    Cardiovascular: Positive for chest pain on admission, resolved now  Gastrointestinal: Negative for abdominal pain, blood in stool, constipation, diarrhea, nausea and vomiting.        Heartburn   Endocrine: Negative for heat intolerance   Genitourinary: Negative for difficulty urinating, flank pain, frequency, hematuria and urgency.        History of urinary and fecal incontinence   Musculoskeletal: Positive for arthralgias (bilateral shoulders), back pain (pinched nerve), gait problem (limping), joint swelling and myalgias.   Skin: Negative for color change, rash and wound.   Allergic/Immunologic: Negative for immunocompromised state.   Neurological: Positive for numbness. Negative for dizziness, seizures, syncope, weakness and headaches.   Hematological: Negative for adenopathy. Bruises/bleeds easily.   Psychiatric/Behavioral: Positive for dysphoric mood. Negative for agitation, confusion and sleep disturbance. The patient is not nervous/anxious.    All other  systems reviewed and are negative.    Physical Exam    Vitals:    04/19/21 0300 04/19/21 0401 04/19/21 0500 04/19/21 0543   BP:  167/73     BP Location:  Left arm     Patient Position:  Lying     Pulse: 76 75 80 77   Resp:  16     Temp:  97.5 °F (36.4 °C)     TempSrc:  Oral     SpO2:       Weight:    55.1 kg (121 lb 8 oz)   Height:         Body mass index is 20.86 kg/m².    Wt Readings from Last 3 Encounters:   04/19/21 55.1 kg (121 lb 8 oz)   04/14/21 54.4 kg (120 lb)   04/13/21 53.5 kg (118 lb)     GENERAL: Alert, thin female appearing her stated age who is in no apparent distress.   HEENT: Sclera anicteric. Head normocephalic, atraumatic. Mucus membranes moist.   NECK: Trachea midline, supple, without masses.   CARDIOVASCULAR: Normal rate, regular rhythm,+ systolic ejection murmur, no rubs, or gallops.  No peripheral edema.  RESPIRATORY: Clear to auscultation bilaterally, normal respiratory effort with occasional cough  BACK:  No CVA tenderness, no vertebral tenderness on palpation  GASTROINTESTINAL:  Abdomen is soft, non-tender, non-distended, no rebound or guarding, no masses, or hernias. No HSM.  SKIN:  Warm, dry, well-perfused.  All visible areas intact.  Ecchymosis left scalp.  Left sided port.  PSYCHIATRIC: AO x3, with appropriate affect, normal thought processes.  NEUROLOGIC: No focal deficits.   MUSCULOSKELETAL: Examined while in bed.  EXTREMITIES:   No cyanosis, clubbing, symmetric.  LYMPHATICS:  No cervical or inguinal adenopathy noted.  Mild supraclavicular fullness on the left when compared to right.     PELVIC exam: Deferred    ECOG PS 2    PROCEDURES: None      Diagnostic Data:   XR Chest 1 View    Result Date: 4/18/2021  Interval decrease seen in size of the masslike densities within the lung fields bilaterally, left greater than right. Portacatheter identified on the left with tip in the SVC. No superimposed infectious process present.  DICTATED:   04/17/2021 EDITED/ls :   04/18/2021  This report  was finalized on 4/18/2021 11:53 AM by Dr. Kalani Laurent MD.      CT Angiogram Chest    Result Date: 4/17/2021  There is marked progression of metastatic disease in the chest since the previous examination. The segmental pulmonary artery to the right middle lobe is encased by tumor and narrowed as a result. This appears similar to the previous examination. No evidence of pulmonary embolism. Signer Name: Austin Sheets MD  Signed: 4/17/2021 8:30 PM  Workstation Name: [id number]  [facility]        Lab Results   Component Value Date    WBC 1.77 (C) 04/18/2021    HGB 6.1 (C) 04/18/2021    HCT 19.3 (C) 04/18/2021    MCV 93.2 04/18/2021     (L) 04/18/2021    NEUTROABS 1.06 (L) 04/18/2021    GLUCOSE 77 04/18/2021    BUN 37 (H) 04/18/2021    CREATININE 0.65 04/18/2021    EGFRIFNONA 90 04/18/2021     04/18/2021    K 4.7 04/18/2021     04/18/2021    CO2 27.0 04/18/2021    MG 1.5 (L) 04/18/2021    PHOS 3.0 04/18/2021    CALCIUM 8.8 04/18/2021    ALBUMIN 3.20 (L) 04/18/2021    AST 17 04/18/2021    ALT 12 04/18/2021    BILITOT 0.3 04/18/2021     No results found for:         Assessment/Plan   This is a 71 y.o. woman with biopsy-proven recurrent endometrial cancer with lung metastasis and mediastinal adenopathy now admitted for pancytopenia, dyspnea with chest pain    History of endometrial cancer as detailed above, now hospitalized with pancytopenia, significant progression on chest despite initial response to chemotherapy  -Labs this morning pending, agree with transfusion support for thrombocytopenia and anemia with continued hospitalization until counts stabilize, symptoms improved.  -We will plan on Keytruda versus Keytruda plus lenvatinib after improvement from this acute illness.  -Status post platelet and blood transfusion.  No G-CSF indicated at this point.  We discussed the importance of fall precautions with significant thrombocytopenia.    Dyspnea with chest  pain  Covid negative  Symptoms improving with supportive management, blood transfusion, reflux treatment    Nausea, dehydration  Resolved    Dayanara Hoffman MD  04/19/21  15:34 EDT

## 2021-04-20 NOTE — OUTREACH NOTE
Prep Survey      Responses   [facility] facility patient discharged from?  [facility]   Is LACE score < 7 ?  No   Emergency Room discharge w/ pulse ox?  No   Eligibility  Readm Mgmt   Discharge diagnosis  Gen weaknessPancytopenia    Does the patient have one of the following disease processes/diagnoses(primary or secondary)?  Other   Does the patient have Home health ordered?  No   Is there a DME ordered?  No   Prep survey completed?  Yes          Margaret Rao RN

## 2021-04-21 NOTE — OUTREACH NOTE
Medical Week 1 Survey      Responses   [facility] patient discharged from?  [facility]   Does the patient have one of the following disease processes/diagnoses(primary or secondary)?  Other   Week 1 attempt successful?  No   Unsuccessful attempts  Attempt 1          Cammie Hilliard RN

## 2021-04-22 NOTE — OUTREACH NOTE
Medical Week 1 Survey      Responses   [facility] patient discharged from?  [facility]   Does the patient have one of the following disease processes/diagnoses(primary or secondary)?  Other   Week 1 attempt successful?  No   Unsuccessful attempts  Attempt 2          Vonda Eaton RN

## 2021-04-22 NOTE — PAYOR COMM NOTE
"Ref # [id number]  Martita Boyd RN, BSN  Phone # [phone]  Fax # [phone]  Ele Strickland (71 y.o. Female)     Date of Birth Social Security Number Address Home Phone MRN    1950  [address] [facility] [address] [phone] [id number]    [employer] Marital Status          Christianity        Admission Date Admission Type Admitting Provider Attending Provider Department, Room/Bed    21 Emergency Rupa Fernandez DO  [facility] 6B, N630/1    Discharge Date Discharge Disposition Discharge Destination        2021 Home or Self Care              Attending Provider: (none)   Allergies: Augmentin [Amoxicillin-pot Clavulanate], Biaxin [Clarithromycin]    Isolation: None   Infection: None   Code Status: Prior    Ht: 162.6 cm (64\")   Wt: 55.1 kg (121 lb 8 oz)    Admission Cmt: None   Principal Problem: None                Active Insurance as of 2021     Primary Coverage     Payor Plan Insurance Group Employer/Plan Group    [facility] BLUE [id number]     Payor Plan Address Payor Plan Phone Number Payor Plan Fax Number Effective Dates    [address]   2004 - None Entered    [place] [address]       Subscriber Name Subscriber Birth Date Member ID       ELE STRICKLAND 1950 [id number]                    Discharge Summary      Rupa Fernandez DO at 21 [address] [place] [address] Medicine Services  DISCHARGE SUMMARY    Patient Name: Ele Strickland  : 1950  MRN: [id number]    Date of Admission: 2021  5:56 PM  Date of Discharge:  21  Primary Care Physician: Sharon Acosta DO    Consults     Date and Time Order Name Status Description    2021 10:49 PM Inpatient Gynecologic Oncology Consult Completed           Hospital Course     Presenting Problem:   Shortness of breath [R06.02]    Active Hospital Problems    Diagnosis  POA   • Aortic stenosis [I35.0]  Yes   • Shortness of breath [R06.02]  Yes   • " Thrombocytopenia (CMS/HCC) [D69.6]  Yes   • Anemia due to chemotherapy [D64.81, T45.1X5A]  Yes   • Metastasis to lung (CMS/HCC) [C78.00]  Yes   • Endometrial cancer (CMS/HCC) [C54.1]  Yes   • GERD (gastroesophageal reflux disease) [K21.9]  Yes   • Chronic obstructive lung disease (CMS/HCC) [J44.9]  Yes   • Type 2 diabetes mellitus without complication (CMS/HCC) [E11.9]  Yes      Resolved Hospital Problems   No resolved problems to display.          Hospital Course:  Ele Strickland is a 71 y.o. female with known endometrial cancer currently being treated with chemo last being 4/7, presents with SOA on exertion and CP.      Gen weakness  Pancytopenia   - likely 2nd to recent chemo  - s/p 2 units of platelets and 2 units of PRBC   - Continued ferrous sulfate  -Blood counts stable on discharge and has follow up next week with Dr. Evans to monitor.       Dyspnea   CP   - COVID neg; CTA no PE, progression of metastatic diease, segmental pulmonary artery to RML encased with tumor and narrowed - similar to previous   - Trop neg x 2; EKG reviewed   - Anemia and GERD may be contributing; she also has metastatic disease in the periphery of the left lung that could be contributing to pain     -  Currently pain is improved. Patient requesting discharge home. Close follow up with PCP and Dr. Evans      Endometrial carcinoma, diagnosed in 2019, with mets to chest, hilar adenopathy   -follows up with Dr. Crowell   -s/p surgery, brachytherapy, s/p chemo-carbo/Taxol-last dose on 4/7/2021., Arimidex 1 mg daily, Decadron  -Currently discontinued Arimidex   - Patient has follow up 4/28 to discuss next steps and monitor blood counts. Dr. Evans reviewed febrile precautions to patient      GERD/achlasia-Nexium 40 mg at home  - Will increase to nexium 40 mg BID x 1 month due to worsening reflux symptoms      Neuropathy-Neurontin 300 mg every 8 hrs     DM 2-metformin 1000 mg every 12 hrs at home  - she was on SSI while inpatient;  will resume on discharge      Bladder dysfunction - continued Ditropan 5 mg p.o. daily     Hyperlipidemia- continued Zocor 40 mg p.o. daily     Hypertension - dyazide held inpatient; will resume on discharge; tolerated PO intake      Smoker-nicotine, smoking cessation advice given     Aortic Stenosis   -- last ECHO 2019 with moderate stenosis   -SOA currently improved; consider repeat ECHO if becomes an ongoing problem        Discharge Follow Up Recommendations for outpatient labs/diagnostics:  PCP Dr. Acosta 1 week  Gyn/onc Dr. Evans 4/28/21       Day of Discharge     HPI:   States she wants to go home. Thinks her left sided paid is improved but still a bit sore when she moves.  Has a pet at home that she has to get back to. Reviewed close follow up. Dr. Evans reviewed febrile precautions and expanding her support system. Answered all questions to the best of my ability.     Review of Systems  Gen- No fevers, chills  CV- No chest pain, palpitations  Resp- No cough, + dyspnea - improved   GI- No N/V/D, abd pain    Vital Signs:   Temp:  [97 °F (36.1 °C)-99.1 °F (37.3 °C)] 99.1 °F (37.3 °C)  Heart Rate:  [71-98] 83  Resp:  [16-18] 18  BP: (112-167)/(57-77) 163/77     Physical Exam:  Constitutional: No acute distress, awake, alert; chronically ill appearing   HENT: NCAT, mucous membranes moist  Respiratory: Clear to auscultation bilaterally, respiratory effort normal   Cardiovascular: RRR, IV/VI murmur   Gastrointestinal: Positive bowel sounds, soft, nontender, nondistended  Musculoskeletal: No bilateral ankle edema  Psychiatric: Appropriate affect, cooperative  Neurologic: Oriented x 3, strength symmetric in all extremities, Cranial Nerves grossly intact to confrontation, speech clear  Skin: No rashes    Pertinent  and/or Most Recent Results     LAB RESULTS:      Lab 04/19/21  0758 04/18/21  0626 04/17/21  1805 04/13/21  1517   WBC 1.53* 1.77* 3.81 4.00   HEMOGLOBIN 8.5* 6.1* 8.7* 9.5*   HEMATOCRIT 26.9* 19.3*  26.8* 29.1*   PLATELETS 79* 113* 11* 44*   NEUTROS ABS 1.02* 1.06* 2.79 3.20   IMMATURE GRANS (ABS) 0.03 0.01 0.02  --    LYMPHS ABS 0.33* 0.53* 0.73 0.70   MONOS ABS 0.13 0.17 0.25 0.10   EOS ABS 0.01 0.00 0.01  --    MCV 92.4 93.2 90.8 90.5   PROTIME  --  12.7  --   --    APTT  --  31.1  --   --          Lab 04/19/21  0600 04/18/21  1551 04/18/21 0626 04/17/21  1805 04/13/21  1517   SODIUM 139  --  140 137 136   POTASSIUM 4.2 4.7 3.4* 4.2 4.1   CHLORIDE 107  --  104 100 98   CO2 22.0  --  27.0 26.0 22.0   ANION GAP 10.0  --  9.0 11.0 16.0*   BUN 22  --  37* 42* 35*   CREATININE 0.81  --  0.65 0.76 0.89   GLUCOSE 112*  --  77 133* 112*   CALCIUM 8.1*  --  8.8 9.5 9.3   MAGNESIUM 2.0  --  1.5*  --   --    PHOSPHORUS  --   --  3.0  --   --          Lab 04/18/21 0626 04/17/21 1805 04/13/21  1517   TOTAL PROTEIN 5.6* 6.6 6.3   ALBUMIN 3.20* 3.80 4.00   GLOBULIN 2.4 2.8 2.3   ALT (SGPT) 12 11 14   AST (SGOT) 17 20 17   BILIRUBIN 0.3 0.3 0.3   ALK PHOS 72 90 97   LIPASE  --  30  --          Lab 04/18/21 0626 04/17/21 1957 04/17/21  1805   PROBNP  --   --  609.3   TROPONIN T  --  <0.010 <0.010   PROTIME 12.7  --   --    INR 0.98  --   --              Lab 04/18/21  0626 04/17/21  2320 04/13/21  1604   IRON 94  --   --    IRON SATURATION 44  --   --    TIBC 213*  --   --    TRANSFERRIN 143*  --   --    FERRITIN 499.80*  --   --    ABO TYPING  --  A A   RH TYPING  --  Negative Negative   ANTIBODY SCREEN  --  Negative Negative         Brief Urine Lab Results  (Last result in the past 365 days)      Color   Clarity   Blood   Leuk Est   Nitrite   Protein   CREAT   Urine HCG        01/04/21 1610 Yellow Clear Negative Negative Negative Negative             Microbiology Results (last 10 days)     Procedure Component Value - Date/Time    COVID PRE-OP / PRE-PROCEDURE SCREENING ORDER (NO ISOLATION) - Swab, Nasopharynx [[id number]]  (Normal) Collected: 04/17/21 2228    Lab Status: Final result Specimen: Swab from Nasopharynx  Updated: 04/17/21 2325    Narrative:      The following orders were created for panel order COVID PRE-OP / PRE-PROCEDURE SCREENING ORDER (NO ISOLATION) - Swab, Nasopharynx.  Procedure                               Abnormality         Status                     ---------                               -----------         ------                     COVID-19,CEPHEID,ROHIT IN-...[[id number]]  Normal              Final result                 Please view results for these tests on the individual orders.    COVID-19,CEPHEID,ROHIT IN-HOUSE(OR EMERGENT/ADD-ON),NP SWAB IN TRANSPORT MEDIA 3-4 HR TAT - Swab, Nasopharynx [[id number]]  (Normal) Collected: 04/17/21 2229    Lab Status: Final result Specimen: Swab from Nasopharynx Updated: 04/17/21 2325     COVID19 Not Detected    Narrative:      Fact sheet for providers: https://www.fda.gov/media/985162/download     Fact sheet for patients: https://www.fda.gov/media/726267/download          XR Chest 1 View    Result Date: 4/18/2021   EXAMINATION: XR CHEST 1 VW - 04/17/2021  INDICATION: Chest Pain triage protocol  COMPARISON: 01/11/2021  FINDINGS: Portable chest reveals cardiac and mediastinal silhouettes within normal limits. Lung fields reveal interval improvement seen in the appearance of the masslike densities within the lung fields bilaterally. The heart is upper limits of normal in size. There is no pleural effusion or pneumothorax. Degenerative changes seen within the spine. Portacatheter identified on the left with tip in the SVC.      Interval decrease seen in size of the masslike densities within the lung fields bilaterally, left greater than right. Portacatheter identified on the left with tip in the SVC. No superimposed infectious process present.  DICTATED:   04/17/2021 EDITED/ls :   04/18/2021  This report was finalized on 4/18/2021 11:53 AM by Dr. Kalani Laurent MD.      CT Angiogram Chest    Result Date: 4/17/2021  CTA Chest INDICATION: Metastatic disease in the lungs.  Shortness of breath prior to arrival. TECHNIQUE: CT angiogram of the chest with 100 cc of Isovue-300 IV contrast. 3-D reconstructions were obtained and reviewed.   Radiation dose reduction techniques included automated exposure control or exposure modulation based on body size. Count of known CT and cardiac nuc med studies performed in previous 12 months: 3. COMPARISON: 3-21 FINDINGS: Chest images at mediastinal window show no pulmonary artery filling defects to suggest emboli. There is extensive mediastinal and hilar adenopathy especially the right hilum. The lymph nodes at the right hilum partially encase and narrow the segmental pulmonary artery branch to the right middle lobe. No pleural or pericardial fluid is seen. There has been marked progression of metastatic disease in the chest since the previous examination. As a typical example, the diameter of the nodes at the right hilum measures 3.5 cm compared with 2.8 cm on the previous exam. A node in the aortopulmonic window on the left measures 2.4 x 3 cm in transverse diameter compared with 0.8 x 2.8 cm on the previous exam. Chest images at lung window show multiple lung masses, all of which are significantly larger since previous examination. As a typical example, there is a mass posteriorly in the left lower lobe measuring 2.3 x 2.7 cm. On the previous scan it measured 1.6 x 1.7 cm. Other mass lesions show similar progression.     There is marked progression of metastatic disease in the chest since the previous examination. The segmental pulmonary artery to the right middle lobe is encased by tumor and narrowed as a result. This appears similar to the previous examination. No evidence of pulmonary embolism. Signer Name: Austin Sheets MD  Signed: 4/17/2021 8:30 PM  Workstation Name: [id number]  [facility]              Results for orders placed during the hospital encounter of 10/30/19    Adult Transthoracic Echo Complete W/ Cont if  Necessary Per Protocol    Interpretation Summary  · Left ventricular systolic function is normal. Calculated EF = 58.0%. Estimated EF appears to be in the range of 56 - 60%.  · There is moderate aortic valve calcification. There is moderate aortic stenosis. Peak gradient 59 mmHg, mean gradient 32 mmHg. Dimensionless index 0.4. Calculated aortic valve area 1.13 cm²  · Mild to moderate aortic regurgitation with a pressure half-time of 577 ms. Vena contract of 0.4 cm  · Sigmoid-shaped ventricular septum is present  · Left ventricular diastolic (grade I) consistent with impaired relaxation.  · Mild tricuspid valve regurgitation is present. Estimated right ventricular systolic pressure from tricuspid regurgitation is mildly elevated (35-45 mmHg).      Plan for Follow-up of Pending Labs/Results:     Discharge Details        Discharge Medications      Changes to Medications      Instructions Start Date   esomeprazole 40 MG capsule  Commonly known as: nexIUM  What changed: when to take this   40 mg, Oral, 2 times daily         Continue These Medications      Instructions Start Date   albuterol sulfate  (90 Base) MCG/ACT inhaler  Commonly known as: PROVENTIL HFA;VENTOLIN HFA;PROAIR HFA   2 puffs, Inhalation, Every 4 Hours PRN      aspirin 81 MG EC tablet   81 mg, Oral, Daily      DULoxetine 60 MG capsule  Commonly known as: CYMBALTA   TAKE 1 CAPSULE BY MOUTH ONCE DAILY FOR 90 DAYS      EXCEDRIN PO   Oral, As Needed      ferrous sulfate 324 (65 Fe) MG tablet delayed-release EC tablet   324 mg, Oral, Daily With Breakfast      gabapentin 300 MG capsule  Commonly known as: NEURONTIN   300 mg, Oral, 3 Times Daily      lidocaine-prilocaine 2.5-2.5 % cream  Commonly known as: EMLA   Topical, As Needed      loratadine 10 MG tablet  Commonly known as: CLARITIN   10 mg, Oral, Daily      metFORMIN 1000 MG tablet  Commonly known as: GLUCOPHAGE   1,000 mg, Oral, 2 Times Daily      nystatin 178084 UNIT/GM powder  Commonly known as:  MYCOSTATIN   Topical, 3 Times Daily      ondansetron 8 MG tablet  Commonly known as: ZOFRAN   8 mg, Oral, 3 Times Daily PRN      oxybutynin XL 5 MG 24 hr tablet  Commonly known as: DITROPAN-XL   5 mg, Oral, Daily      promethazine 25 MG tablet  Commonly known as: PHENERGAN   25 mg, Oral, Every 6 Hours PRN      simvastatin 40 MG tablet  Commonly known as: ZOCOR   40 mg, Oral, Daily      triamterene-hydrochlorothiazide 37.5-25 MG per capsule  Commonly known as: DYAZIDE   1 capsule, Oral, Every Morning         Stop These Medications    anastrozole 1 MG tablet  Commonly known as: ARIMIDEX     dexamethasone 4 MG tablet  Commonly known as: DECADRON            Allergies   Allergen Reactions   • Augmentin [Amoxicillin-Pot Clavulanate] Nausea And Vomiting   • Biaxin [Clarithromycin] Hallucinations         Discharge Disposition:  Home or Self Care    Diet:  Hospital:  Diet Order   Procedures   • Diet Regular; Low Sodium, Consistent Carbohydrate       Activity:  Activity Instructions     Activity as Tolerated            Restrictions or Other Recommendations:       CODE STATUS:    Code Status and Medical Interventions:   Ordered at: 04/17/21 2249     Level Of Support Discussed With:    Patient     Code Status:    CPR     Medical Interventions (Level of Support Prior to Arrest):    Full       Future Appointments   Date Time Provider Department Center   4/28/2021  1:30 PM CHAIR 5  [facility]   4/28/2021  2:30 PM Zuly Evans MD [facility]   5/5/2021  9:30 AM CHAIR 21  [facility]   5/14/2021  9:00 AM ROH[facility] CT 1  [facility] CT [facility]   7/7/2021  9:30 AM Zuly Evans MD [facility]       Additional Instructions for the Follow-ups that You Need to Schedule     Discharge Follow-up with PCP   As directed       Currently Documented PCP:    Sharon Acosta DO    PCP Phone Number:    [phone]     Follow Up Details: PCP Dr. Acosta 1 week         Discharge Follow-up with Specified Provider: Gynecology Oncology   Cristina 4/28/21 as scheduled   As directed      To: Gynecology Oncology Dr. Evans 4/28/21 as scheduled                     Rupa Varela DO  04/19/21      Time Spent on Discharge:  I spent  35  minutes on this discharge activity which included: face-to-face encounter with the patient, reviewing the data in the system, coordination of the care with the nursing staff as well as consultants, documentation, and entering orders.            Electronically signed by Rupa Varela DO at 04/19/21 1229       Discharge Order (From admission, onward)     Start     Ordered    04/19/21 1213  Discharge patient  Once     Expected Discharge Date: 04/19/21    Discharge Disposition: Home or Self Care    Physician of Record for Attribution - Please select from Treatment Team: RUPA VARELA [[id number]]    Review needed by CMO to determine Physician of Record: No       Question Answer Comment   Physician of Record for Attribution - Please select from Treatment Team RUPA VARELA    Review needed by CMO to determine Physician of Record No        04/19/21 8656

## 2021-04-23 NOTE — OUTREACH NOTE
Medical Week 1 Survey      Responses   [facility] patient discharged from?  [place]   Does the patient have one of the following disease processes/diagnoses(primary or secondary)?  Other   Week 1 attempt successful?  No   Unsuccessful attempts  Attempt 3          Nicole Vazquez RN

## 2021-04-25 PROBLEM — R53.1 WEAKNESS: Status: ACTIVE | Noted: 2021-01-01

## 2021-04-26 PROBLEM — E43 SEVERE MALNUTRITION (HCC): Status: ACTIVE | Noted: 2021-01-01

## 2021-04-26 NOTE — OUTREACH NOTE
Medical Week 2 Survey      Responses   [facility] patient discharged from?  [place]   Does the patient have one of the following disease processes/diagnoses(primary or secondary)?  Other   Week 2 attempt successful?  No   Revoke  Readmitted          Ericka Bowers RN

## 2021-04-28 PROBLEM — D50.0 BLOOD LOSS ANEMIA: Status: ACTIVE | Noted: 2021-01-01

## 2021-05-01 NOTE — PLAN OF CARE
"Goal Outcome Evaluation:  Plan of Care Reviewed With: patient      Pt VSS. Pt repeatedly called out overnight for pain medication. Pt stated that she had  pain in her arm and then her chest when she coughed. When respiratory came for her treatment she kept taking the mask off. Pt also would take the oxygen she had for comfort off as well. Pt became irritated when it was explained when her next available pain medication would be. Alternatives were given, to which she did not like such as her tylenol. She insisted on the \"shot\", thus morphine was given.   "

## 2021-05-02 NOTE — OUTREACH NOTE
Prep Survey      Responses   [facility] facility patient discharged from?  [place]   Is LACE score < 7 ?  No   Emergency Room discharge w/ pulse ox?  No   Eligibility  Readm Mgmt   Discharge diagnosis  **Blood loss anemia    Does the patient have one of the following disease processes/diagnoses(primary or secondary)?  Other   Does the patient have Home health ordered?  No   Is there a DME ordered?  No   Prep survey completed?  Yes          Chrissie Paula RN

## 2021-05-03 NOTE — TELEPHONE ENCOUNTER
Caller: JANET MCNAIR      Relationship to patient: WIFE OF PT SON NOT ON [facility]. PT GAVE VERBAL CONFIRMATION TO HUB FOR SON'S WIFE TO SCHEDULE THIS APPT     Best call back number: [phone]    Chief complaint: PT TO SCHEDULE A 1-2 WEEK HOSPITAL FU  PT ALSO HAS PORT FLUSH SCHED 5/5 BUT HAD ONE FRI 4/30 WHILE IN HOSPITAL    Type of visit: HOSPITAL FU    Requested date: NA    If rescheduling, when is the original appointment: NA    Additional notes:PT HAS VERY LIMITED MOBILITY DOES NOT WANT TO OBTAIN THROUGH HOSPICE .     WHERE CAN PT FIND WHEEL CHAIR FOR LESS COST TO HER.

## 2021-05-03 NOTE — TELEPHONE ENCOUNTER
RN spoke with patients daughter Steffen.  Steffen was calling on behalf of her sister in law to figure out how to get home health services for her mother.  RN stated to Steffen that the patient had declined all those services as hospital discharge and at this point the best thing to do would be to speak to our  .  RN stated that she would send  a message to call the family and try to help as best she could.  Steffen stated she understood.

## 2021-05-03 NOTE — TELEPHONE ENCOUNTER
Patient's daughter in law Marium Wetzel called asking who discharged patient from the hospital because she is currently immobile and in need of home health services.  RN was unable to provide a lot of information to Mrs. Wetzel because there is no consent to release information to her.  RN informed Mrs. Wetzel of this and she stated she would get in contact with the patients daughter to call us.

## 2021-05-04 NOTE — OUTREACH NOTE
Medical Week 1 Survey      Responses   [facility] patient discharged from?  [place]   Does the patient have one of the following disease processes/diagnoses(primary or secondary)?  Other   Week 1 attempt successful?  No   Unsuccessful attempts  Attempt 1          Nicole Vazquez RN

## 2021-05-04 NOTE — TELEPHONE ENCOUNTER
"MANNY returned a phone call to pt daughter, Steffen Feldman.  She states that pt is living with her brother and sister in law.  She states that pt has not been acting like herself since returning from her hospital admission.  She reports that she is unable to ambulate and that she yells out during the night \"help me, help me\".  They are asking for help at home.  She is requesting a home health evaluation.  MANNY explained how the process works and if pt qualifies for home physical or occupational therapy, she must be working towards achieving her goals or she will be discharged.  She cannot refuse to participate in the therapy either.  MANNY informed her that we can obtain an order for home health evaluation from Dr. Evans.  They should contact her within a day or two.  They can also assess the home and determine if any medical equipment is needed for pt safety.  Lastly, she asked about how to go about completing a Power of  and Healthcare documents.  MANNY will plan to meet with them on 5/12 at 1:15 to complete this document and the POA must be done by an .  SW available for ongoing support and resource needs.  "

## 2021-05-05 NOTE — OUTREACH NOTE
"Medical Week 1 Survey      Responses   [facility] patient discharged from?  [place]   Does the patient have one of the following disease processes/diagnoses(primary or secondary)?  Other   Week 1 attempt successful?  Yes   Call start time  1431   Call end time  1435   Discharge diagnosis  Blood loss anemia    Person spoke with today (if not patient) and relationship  Steffen-daughter    Meds reviewed with patient/caregiver?  Yes   Is the patient having any side effects they believe may be caused by any medication additions or changes?  No   Does the patient have all medications ordered at discharge?  Yes   Is the patient taking all medications as directed (includes completed medication regime)?  Yes   Comments regarding appointments  Appt for CT on 5/14/21   Does the patient have a primary care provider?   Yes   Does the patient have an appointment with their PCP within 7 days of discharge?  Yes   Comments regarding PCP  5/12/21   Has the patient kept scheduled appointments due by today?  N/A   Home health comments  Daughter called Angie, r/t .  is working on that.    Psychosocial issues?  No   Did the patient receive a copy of their discharge instructions?  Yes   Nursing interventions  Reviewed instructions with patient   What is the patient's perception of their health status since discharge?  Worsening [Daughter reports patient would scream out \"help me\" during the night. She's not mobile either. ]   Is the patient/caregiver able to teach back signs and symptoms related to disease process for when to call PCP?  Yes   Is the patient/caregiver able to teach back signs and symptoms related to disease process for when to call 911?  Yes   Is the patient/caregiver able to teach back the hierarchy of who to call/visit for symptoms/problems? PCP, Specialist, Home health nurse, Urgent Care, ED, 911  Yes   If the patient is a current smoker, are they able to teach back resources for cessation?  " -- [Pt was sent home with patches. They are helping. She's not smoking. ]   Week 1 call completed?  Yes          Lisa Olmstead RN

## 2021-05-11 NOTE — OUTREACH NOTE
Medical Week 2 Survey      Responses   [facility] patient discharged from?  [place]   Does the patient have one of the following disease processes/diagnoses(primary or secondary)?  Other   Week 2 attempt successful?  No   Unsuccessful attempts  Attempt 1          Tonya Jamison RN

## 2021-05-12 ENCOUNTER — TELEPHONE (OUTPATIENT)
Dept: GYNECOLOGIC ONCOLOGY | Facility: CLINIC | Age: 71
End: 2021-05-12

## 2021-05-12 NOTE — TELEPHONE ENCOUNTER
“Please be informed that patient has passed. Patient has been marked  in the system. The date of death is: 2021    Caller: AILYN    Relationship: DAUGHTER    Best call back number: [phone]

## 2021-05-13 ENCOUNTER — READMISSION MANAGEMENT (OUTPATIENT)
Dept: CALL CENTER | Facility: HOSPITAL | Age: 71
End: 2021-05-13

## 2021-05-13 NOTE — OUTREACH NOTE
Medical Week 2 Survey      Responses   [facility] patient discharged from?  [place]   Does the patient have one of the following disease processes/diagnoses(primary or secondary)?  Other   Week 2 attempt successful?  No   Revoke  Patient           Mckayla Pichardo RN

## 2021-05-14 ENCOUNTER — APPOINTMENT (OUTPATIENT)
Dept: CT IMAGING | Facility: HOSPITAL | Age: 71
End: 2021-05-14

## 2024-03-08 NOTE — TELEPHONE ENCOUNTER
Left message for patient to call to schedule for infusion.    [de-identified] : On exam there is a dorsal radial prominence over the left index DIP joint.  Slightly compressible.  Slight radial deviation deformity at the DIP joint as well.  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  Ultrasound demonstrates left DIP joint arthritis with possibly small associated mucous cyst

## (undated) DEVICE — INTENDED FOR TISSUE SEPARATION, AND OTHER PROCEDURES THAT REQUIRE A SHARP SURGICAL BLADE TO PUNCTURE OR CUT.: Brand: BARD-PARKER ® STAINLESS STEEL BLADES

## (undated) DEVICE — COVER,LIGHT HANDLE,FLX,1/PK: Brand: MEDLINE INDUSTRIES, INC.

## (undated) DEVICE — OCCL COLPO PNEUMO  STRL

## (undated) DEVICE — APPL CHLORAPREP W/TINT 26ML BLU

## (undated) DEVICE — SUCTION CANISTER, 1000CC,SAFELINER: Brand: DEROYAL

## (undated) DEVICE — OBT BLADLES ENDOWRIST DAVINCI/S 8MM

## (undated) DEVICE — MANIP UTER RUMI 2 KOH EFFICIENT 3CM BL

## (undated) DEVICE — SOL IRR H2O BTL 1000ML STRL

## (undated) DEVICE — FLTR PLUMEPORT LAP W/CONN STRL

## (undated) DEVICE — PAD ARMBRD SURG CONVOL 7.5X20X2IN

## (undated) DEVICE — ENDOPATH XCEL BLADELESS TROCARS WITH STABILITY SLEEVES: Brand: ENDOPATH XCEL

## (undated) DEVICE — CANNULA SEAL

## (undated) DEVICE — SHEET, DRAPE, SPLIT, STERILE: Brand: MEDLINE

## (undated) DEVICE — TIP COVER ACCESSORY

## (undated) DEVICE — GLV SURG DERMASSURE GRN LF PF SZ 6.5

## (undated) DEVICE — DRP ADAPT ALLY UTER POSTN SYS 1P/U

## (undated) DEVICE — INTRO ACCSR BLNT TP

## (undated) DEVICE — CONTN GRAD MEAS TRIANG 32OZ BLK

## (undated) DEVICE — PK MAJ GYN DAVINCI 10

## (undated) DEVICE — SPNG VERSALON 4X4 4PLY NONSTRL LF BG/200

## (undated) DEVICE — LUBE GEL ENDOGLIDE 1.1OZ

## (undated) DEVICE — KT ORCA ORCAPOD DISP STRL

## (undated) DEVICE — GLV SURG SENSICARE MICRO PF LF 6 STRL

## (undated) DEVICE — TUBING, SUCTION, 1/4" X 10', STRAIGHT: Brand: MEDLINE

## (undated) DEVICE — SUT MNCRYL PLS ANTIB UD 3/0 PS2 27IN

## (undated) DEVICE — SINGLE-USE BIOPSY FORCEPS: Brand: RADIAL JAW 4

## (undated) DEVICE — SYR LUERLOK 50ML

## (undated) DEVICE — [HIGH FLOW INSUFFLATOR,  DO NOT USE IF PACKAGE IS DAMAGED,  KEEP DRY,  KEEP AWAY FROM SUNLIGHT,  PROTECT FROM HEAT AND RADIOACTIVE SOURCES.]: Brand: PNEUMOSURE

## (undated) DEVICE — KIT INCLUDES:GTB14, ALEXIS CONTAINED EXTRACTION SYSTEMC0R47, 12X100 KII BALLOON BLUNT TIP TROCAR: Brand: ALEXIS CONTAINED EXTRACTION SYSTEM WITH KII BALLOON BLUNT TIP SYSTEM

## (undated) DEVICE — ANTIBACTERIAL UNDYED BRAIDED (POLYGLACTIN 910), SYNTHETIC ABSORBABLE SUTURE: Brand: COATED VICRYL

## (undated) DEVICE — THE BITE BLOCK MAXI, LATEX FREE STRAP IS USED TO PROTECT THE ENDOSCOPE INSERTION TUBE FROM BEING BITTEN BY THE PATIENT.

## (undated) DEVICE — MANIP UTER RUMI TP 6.7MMX8CM BLU

## (undated) DEVICE — GOWN,NON-REINFORCED,SIRUS,SET IN SLV,XL: Brand: MEDLINE

## (undated) DEVICE — Device

## (undated) DEVICE — SKIN AFFIX SURG ADHESIVE 72/CS 0.55ML: Brand: MEDLINE

## (undated) DEVICE — HYBRID CO2 TUBING/CAP SET FOR OLYMPUS® SCOPES & CO2 SOURCE: Brand: ERBE

## (undated) DEVICE — BNDR ABD PREMIUM/UNIV 10IN 27TO48IN

## (undated) DEVICE — ANTIBACTERIAL UNDYED BRAIDED (POLYGLACTIN 910), SYNTHETIC ABSORBABLE SURGICAL SUTURE: Brand: COATED VICRYL